# Patient Record
Sex: FEMALE | Race: BLACK OR AFRICAN AMERICAN | Employment: OTHER | URBAN - METROPOLITAN AREA
[De-identification: names, ages, dates, MRNs, and addresses within clinical notes are randomized per-mention and may not be internally consistent; named-entity substitution may affect disease eponyms.]

---

## 2018-02-19 ENCOUNTER — HOSPITAL ENCOUNTER (OUTPATIENT)
Dept: LAB | Age: 73
Discharge: HOME | End: 2018-02-19
Payer: MEDICARE

## 2018-02-19 DIAGNOSIS — I50.22 CHRONIC SYSTOLIC HF (HEART FAILURE) (HCC): Chronic | ICD-10-CM

## 2018-02-19 LAB
ALBUMIN SERPL-MCNC: 3.7 G/DL (ref 3.2–4.6)
ALBUMIN/GLOB SERPL: 0.8 {RATIO}
ALP SERPL-CCNC: 66 U/L (ref 50–136)
ALT SERPL-CCNC: 15 U/L (ref 12–65)
ANION GAP SERPL CALC-SCNC: 5 MMOL/L
AST SERPL-CCNC: 22 U/L (ref 15–37)
BASOPHILS # BLD: 0 K/UL (ref 0–0.2)
BASOPHILS NFR BLD: 0 % (ref 0–2)
BILIRUB SERPL-MCNC: 0.6 MG/DL (ref 0.2–1.1)
BUN SERPL-MCNC: 22 MG/DL (ref 8–23)
CALCIUM SERPL-MCNC: 9 MG/DL (ref 8.3–10.4)
CHLORIDE SERPL-SCNC: 107 MMOL/L (ref 98–107)
CO2 SERPL-SCNC: 29 MMOL/L (ref 21–32)
CREAT SERPL-MCNC: 1.1 MG/DL (ref 0.6–1)
DIFFERENTIAL METHOD BLD: ABNORMAL
EOSINOPHIL # BLD: 0.1 K/UL (ref 0–0.8)
EOSINOPHIL NFR BLD: 2 % (ref 0.5–7.8)
ERYTHROCYTE [DISTWIDTH] IN BLOOD BY AUTOMATED COUNT: 14.3 % (ref 11.9–14.6)
GLOBULIN SER CALC-MCNC: 4.7 G/DL
GLUCOSE SERPL-MCNC: 106 MG/DL (ref 65–100)
HCT VFR BLD AUTO: 36 % (ref 35.8–46.3)
HGB BLD-MCNC: 11.4 G/DL (ref 11.7–15.4)
LYMPHOCYTES # BLD: 1.7 K/UL (ref 0.5–4.6)
LYMPHOCYTES NFR BLD: 35 % (ref 13–44)
MAGNESIUM SERPL-MCNC: 2.6 MG/DL (ref 1.8–2.4)
MCH RBC QN AUTO: 26.9 PG (ref 26.1–32.9)
MCHC RBC AUTO-ENTMCNC: 31.7 G/DL (ref 31.4–35)
MCV RBC AUTO: 84.9 FL (ref 79.6–97.8)
MONOCYTES # BLD: 0.5 K/UL (ref 0.1–1.3)
MONOCYTES NFR BLD: 11 % (ref 4–12)
NEUTS SEG # BLD: 2.5 K/UL (ref 1.7–8.2)
NEUTS SEG NFR BLD: 52 % (ref 43–78)
PLATELET # BLD AUTO: 209 K/UL (ref 150–450)
PMV BLD AUTO: 9.7 FL (ref 10.8–14.1)
POTASSIUM SERPL-SCNC: 3.7 MMOL/L (ref 3.5–5.1)
PROT SERPL-MCNC: 8.4 G/DL (ref 6.3–8.2)
RBC # BLD AUTO: 4.24 M/UL (ref 4.05–5.25)
SODIUM SERPL-SCNC: 141 MMOL/L (ref 136–145)
WBC # BLD AUTO: 4.9 K/UL (ref 4.3–11.1)

## 2018-02-19 PROCEDURE — 36415 COLL VENOUS BLD VENIPUNCTURE: CPT | Performed by: INTERNAL MEDICINE

## 2018-02-19 PROCEDURE — 80053 COMPREHEN METABOLIC PANEL: CPT | Performed by: INTERNAL MEDICINE

## 2018-02-19 PROCEDURE — 83735 ASSAY OF MAGNESIUM: CPT | Performed by: INTERNAL MEDICINE

## 2018-02-19 PROCEDURE — 85025 COMPLETE CBC W/AUTO DIFF WBC: CPT | Performed by: INTERNAL MEDICINE

## 2018-02-25 ENCOUNTER — ANESTHESIA EVENT (OUTPATIENT)
Dept: SURGERY | Age: 73
End: 2018-02-25
Payer: MEDICARE

## 2018-02-25 RX ORDER — HYDROMORPHONE HYDROCHLORIDE 2 MG/ML
0.5 INJECTION, SOLUTION INTRAMUSCULAR; INTRAVENOUS; SUBCUTANEOUS
Status: CANCELLED | OUTPATIENT
Start: 2018-02-25

## 2018-02-25 RX ORDER — OXYCODONE HYDROCHLORIDE 5 MG/1
5 TABLET ORAL
Status: CANCELLED | OUTPATIENT
Start: 2018-02-25

## 2018-02-25 RX ORDER — SODIUM CHLORIDE, SODIUM LACTATE, POTASSIUM CHLORIDE, CALCIUM CHLORIDE 600; 310; 30; 20 MG/100ML; MG/100ML; MG/100ML; MG/100ML
100 INJECTION, SOLUTION INTRAVENOUS CONTINUOUS
Status: CANCELLED | OUTPATIENT
Start: 2018-02-25

## 2018-02-26 ENCOUNTER — ANESTHESIA (OUTPATIENT)
Dept: SURGERY | Age: 73
End: 2018-02-26
Payer: MEDICARE

## 2018-02-26 ENCOUNTER — HOSPITAL ENCOUNTER (OUTPATIENT)
Age: 73
Setting detail: OUTPATIENT SURGERY
Discharge: HOME | End: 2018-02-26
Attending: INTERNAL MEDICINE | Admitting: INTERNAL MEDICINE
Payer: MEDICARE

## 2018-02-26 ENCOUNTER — APPOINTMENT (OUTPATIENT)
Dept: CARDIAC CATH/INVASIVE PROCEDURES | Age: 73
End: 2018-02-26
Payer: MEDICARE

## 2018-02-26 VITALS
RESPIRATION RATE: 19 BRPM | OXYGEN SATURATION: 95 % | BODY MASS INDEX: 23.82 KG/M2 | TEMPERATURE: 97 F | HEART RATE: 77 BPM | SYSTOLIC BLOOD PRESSURE: 123 MMHG | HEIGHT: 65 IN | WEIGHT: 143 LBS | DIASTOLIC BLOOD PRESSURE: 56 MMHG

## 2018-02-26 LAB
ALBUMIN SERPL-MCNC: 3.4 G/DL (ref 3.2–4.6)
ALBUMIN/GLOB SERPL: 0.8 {RATIO} (ref 1.2–3.5)
ALP SERPL-CCNC: 56 U/L (ref 50–136)
ALT SERPL-CCNC: 10 U/L (ref 12–65)
ANION GAP SERPL CALC-SCNC: 8 MMOL/L (ref 7–16)
AST SERPL-CCNC: 17 U/L (ref 15–37)
ATRIAL RATE: 62 BPM
BILIRUB SERPL-MCNC: 0.4 MG/DL (ref 0.2–1.1)
BUN SERPL-MCNC: 10 MG/DL (ref 8–23)
CALCIUM SERPL-MCNC: 8.8 MG/DL (ref 8.3–10.4)
CALCULATED R AXIS, ECG10: 58 DEGREES
CALCULATED T AXIS, ECG11: 13 DEGREES
CHLORIDE SERPL-SCNC: 107 MMOL/L (ref 98–107)
CO2 SERPL-SCNC: 29 MMOL/L (ref 21–32)
CREAT SERPL-MCNC: 0.85 MG/DL (ref 0.6–1)
DIAGNOSIS, 93000: NORMAL
ERYTHROCYTE [DISTWIDTH] IN BLOOD BY AUTOMATED COUNT: 14.7 % (ref 11.9–14.6)
GLOBULIN SER CALC-MCNC: 4.1 G/DL (ref 2.3–3.5)
GLUCOSE SERPL-MCNC: 88 MG/DL (ref 65–100)
HCT VFR BLD AUTO: 34.4 % (ref 35.8–46.3)
HGB BLD-MCNC: 10.6 G/DL (ref 11.7–15.4)
INR PPP: 1.1
MAGNESIUM SERPL-MCNC: 1.9 MG/DL (ref 1.8–2.4)
MCH RBC QN AUTO: 26 PG (ref 26.1–32.9)
MCHC RBC AUTO-ENTMCNC: 30.8 G/DL (ref 31.4–35)
MCV RBC AUTO: 84.5 FL (ref 79.6–97.8)
PLATELET # BLD AUTO: 215 K/UL (ref 150–450)
PMV BLD AUTO: 9.7 FL (ref 10.8–14.1)
POTASSIUM SERPL-SCNC: 3.3 MMOL/L (ref 3.5–5.1)
PROT SERPL-MCNC: 7.5 G/DL (ref 6.3–8.2)
PROTHROMBIN TIME: 14.1 SEC (ref 11.5–14.5)
Q-T INTERVAL, ECG07: 422 MS
QRS DURATION, ECG06: 142 MS
QTC CALCULATION (BEZET), ECG08: 442 MS
RBC # BLD AUTO: 4.07 M/UL (ref 4.05–5.25)
SODIUM SERPL-SCNC: 144 MMOL/L (ref 136–145)
VENTRICULAR RATE, ECG03: 66 BPM
WBC # BLD AUTO: 4.7 K/UL (ref 4.3–11.1)

## 2018-02-26 PROCEDURE — 74011250636 HC RX REV CODE- 250/636

## 2018-02-26 PROCEDURE — 74011000250 HC RX REV CODE- 250

## 2018-02-26 PROCEDURE — 77030028698 HC BLD TISS PLSM MEDT -D

## 2018-02-26 PROCEDURE — 80053 COMPREHEN METABOLIC PANEL: CPT | Performed by: INTERNAL MEDICINE

## 2018-02-26 PROCEDURE — C1722 AICD, SINGLE CHAMBER: HCPCS

## 2018-02-26 PROCEDURE — 77030012935 HC DRSG AQUACEL BMS -B

## 2018-02-26 PROCEDURE — 74011000250 HC RX REV CODE- 250: Performed by: INTERNAL MEDICINE

## 2018-02-26 PROCEDURE — 93641 EP EVL 1/2CHMB PAC CVDFB TST: CPT

## 2018-02-26 PROCEDURE — 74011250636 HC RX REV CODE- 250/636: Performed by: INTERNAL MEDICINE

## 2018-02-26 PROCEDURE — 93005 ELECTROCARDIOGRAM TRACING: CPT | Performed by: INTERNAL MEDICINE

## 2018-02-26 PROCEDURE — 33262 RMVL& REPLC PULSE GEN 1 LEAD: CPT

## 2018-02-26 PROCEDURE — 74011000272 HC RX REV CODE- 272: Performed by: INTERNAL MEDICINE

## 2018-02-26 PROCEDURE — 83735 ASSAY OF MAGNESIUM: CPT | Performed by: INTERNAL MEDICINE

## 2018-02-26 PROCEDURE — 77030008467 HC STPLR SKN COVD -B

## 2018-02-26 PROCEDURE — 85027 COMPLETE CBC AUTOMATED: CPT | Performed by: INTERNAL MEDICINE

## 2018-02-26 PROCEDURE — 74011250636 HC RX REV CODE- 250/636: Performed by: ANESTHESIOLOGY

## 2018-02-26 PROCEDURE — 85610 PROTHROMBIN TIME: CPT | Performed by: INTERNAL MEDICINE

## 2018-02-26 PROCEDURE — 76060000033 HC ANESTHESIA 1 TO 1.5 HR: Performed by: INTERNAL MEDICINE

## 2018-02-26 RX ORDER — FENTANYL CITRATE 50 UG/ML
INJECTION, SOLUTION INTRAMUSCULAR; INTRAVENOUS AS NEEDED
Status: DISCONTINUED | OUTPATIENT
Start: 2018-02-26 | End: 2018-02-26 | Stop reason: HOSPADM

## 2018-02-26 RX ORDER — FENTANYL CITRATE 50 UG/ML
100 INJECTION, SOLUTION INTRAMUSCULAR; INTRAVENOUS ONCE
Status: DISCONTINUED | OUTPATIENT
Start: 2018-02-26 | End: 2018-02-26 | Stop reason: HOSPADM

## 2018-02-26 RX ORDER — LIDOCAINE HYDROCHLORIDE 10 MG/ML
0.1 INJECTION INFILTRATION; PERINEURAL AS NEEDED
Status: DISCONTINUED | OUTPATIENT
Start: 2018-02-26 | End: 2018-02-26 | Stop reason: HOSPADM

## 2018-02-26 RX ORDER — MIDAZOLAM HYDROCHLORIDE 1 MG/ML
2 INJECTION, SOLUTION INTRAMUSCULAR; INTRAVENOUS
Status: DISCONTINUED | OUTPATIENT
Start: 2018-02-26 | End: 2018-02-26 | Stop reason: HOSPADM

## 2018-02-26 RX ORDER — BUPIVACAINE HYDROCHLORIDE AND EPINEPHRINE 5; 5 MG/ML; UG/ML
60 INJECTION, SOLUTION EPIDURAL; INTRACAUDAL; PERINEURAL ONCE
Status: COMPLETED | OUTPATIENT
Start: 2018-02-26 | End: 2018-02-26

## 2018-02-26 RX ORDER — MIDAZOLAM HYDROCHLORIDE 1 MG/ML
2 INJECTION, SOLUTION INTRAMUSCULAR; INTRAVENOUS ONCE
Status: DISCONTINUED | OUTPATIENT
Start: 2018-02-26 | End: 2018-02-26 | Stop reason: HOSPADM

## 2018-02-26 RX ORDER — CEFAZOLIN SODIUM/WATER 2 G/20 ML
2 SYRINGE (ML) INTRAVENOUS
Status: COMPLETED | OUTPATIENT
Start: 2018-02-26 | End: 2018-02-26

## 2018-02-26 RX ORDER — SODIUM CHLORIDE, SODIUM LACTATE, POTASSIUM CHLORIDE, CALCIUM CHLORIDE 600; 310; 30; 20 MG/100ML; MG/100ML; MG/100ML; MG/100ML
100 INJECTION, SOLUTION INTRAVENOUS CONTINUOUS
Status: DISCONTINUED | OUTPATIENT
Start: 2018-02-26 | End: 2018-02-26 | Stop reason: HOSPADM

## 2018-02-26 RX ORDER — LIDOCAINE HYDROCHLORIDE 20 MG/ML
INJECTION, SOLUTION EPIDURAL; INFILTRATION; INTRACAUDAL; PERINEURAL AS NEEDED
Status: DISCONTINUED | OUTPATIENT
Start: 2018-02-26 | End: 2018-02-26 | Stop reason: HOSPADM

## 2018-02-26 RX ORDER — PROPOFOL 10 MG/ML
INJECTION, EMULSION INTRAVENOUS AS NEEDED
Status: DISCONTINUED | OUTPATIENT
Start: 2018-02-26 | End: 2018-02-26 | Stop reason: HOSPADM

## 2018-02-26 RX ORDER — PROPOFOL 10 MG/ML
INJECTION, EMULSION INTRAVENOUS
Status: DISCONTINUED | OUTPATIENT
Start: 2018-02-26 | End: 2018-02-26 | Stop reason: HOSPADM

## 2018-02-26 RX ORDER — EPHEDRINE SULFATE 50 MG/ML
INJECTION, SOLUTION INTRAVENOUS AS NEEDED
Status: DISCONTINUED | OUTPATIENT
Start: 2018-02-26 | End: 2018-02-26 | Stop reason: HOSPADM

## 2018-02-26 RX ADMIN — PROPOFOL 140 MCG/KG/MIN: 10 INJECTION, EMULSION INTRAVENOUS at 11:49

## 2018-02-26 RX ADMIN — EPHEDRINE SULFATE 5 MG: 50 INJECTION, SOLUTION INTRAVENOUS at 12:27

## 2018-02-26 RX ADMIN — LIDOCAINE HYDROCHLORIDE 20 MG: 20 INJECTION, SOLUTION EPIDURAL; INFILTRATION; INTRACAUDAL; PERINEURAL at 11:49

## 2018-02-26 RX ADMIN — EPHEDRINE SULFATE 5 MG: 50 INJECTION, SOLUTION INTRAVENOUS at 12:18

## 2018-02-26 RX ADMIN — Medication 2 G: at 11:53

## 2018-02-26 RX ADMIN — NEOMYCIN AND POLYMYXIN B SULFATES: 40; 200000 IRRIGANT IRRIGATION at 12:30

## 2018-02-26 RX ADMIN — PROPOFOL 20 MG: 10 INJECTION, EMULSION INTRAVENOUS at 12:08

## 2018-02-26 RX ADMIN — SODIUM CHLORIDE, SODIUM LACTATE, POTASSIUM CHLORIDE, AND CALCIUM CHLORIDE: 600; 310; 30; 20 INJECTION, SOLUTION INTRAVENOUS at 11:44

## 2018-02-26 RX ADMIN — FENTANYL CITRATE 25 MCG: 50 INJECTION, SOLUTION INTRAMUSCULAR; INTRAVENOUS at 12:08

## 2018-02-26 RX ADMIN — FENTANYL CITRATE 25 MCG: 50 INJECTION, SOLUTION INTRAMUSCULAR; INTRAVENOUS at 11:58

## 2018-02-26 RX ADMIN — PROPOFOL 40 MG: 10 INJECTION, EMULSION INTRAVENOUS at 11:49

## 2018-02-26 RX ADMIN — BUPIVACAINE HYDROCHLORIDE AND EPINEPHRINE BITARTRATE 300 MG: 5; .005 INJECTION, SOLUTION EPIDURAL; INTRACAUDAL; PERINEURAL at 12:00

## 2018-02-26 NOTE — PROGRESS NOTES
Report received from [address] [place] [address]. Procedural findings communicated. Intra procedural  medication administration reviewed. Progression of care discussed. Patient received into [address] [facility] 5 post pacemaker battery change.      Incision site without bleeding or swelling     Dressing dry and intact     Patient instructed to limit movement to right upper extremity    Routine post procedural vital signs and site assessment initiated

## 2018-02-26 NOTE — PROGRESS NOTES
Discharge instructions given per orders, voiced good understanding of post pacemaker battery change care, medications & follow up care. Denies any questions.  Discharged to home with family

## 2018-02-26 NOTE — PROCEDURES
PRE-ELECTROPHYSIOLOGY STUDY DIAGNOSES  1. Implantable cardioverter-defibrillator at elective replacement interval.  2. Ischemic dilated cardiomyopathy. 3. Ejection fraction 20-25% at the time of implant  4. Class II heart failure symptoms. 5. Initial implantable cardioverter-defibrillator was implanted for primary prevention. 6. On Guideline directed medical therapy maximum dose for 3 months prior to implant. 7. Life expectancy greater than 1 year. Rut Mustard PROCEDURES PERFORMED  1. Removal and replacement of a Single/Dual implantable cardioverter-defibrillator. 2. Testing of implantable cardioverter-defibrillator. Anesthesia: MAC     Estimated Blood Loss: Less than 10 mL     Specimens: * No specimens in log *     PROCEDURE IN DETAIL: The patient was brought into the EP lab in a fasting  state. The left shoulder was prepped and draped in the usual sterile  fashion, skin anesthetized with lidocaine with epinephrine. Incision was  made over the previous ICD. Previous  ICD was  removed through blunt dissection techniques. Pocket was irrigated with  triple-antibiotic flush. The leads were connected to a new Biotronik   ICD, model Itrevia 7 VR-T, serial number [id number]. The  leads and ICD were then placed in the pocket area. Defibrillator threshold testing was performed. The patient was placed  into ventricular fibrillation and was converted out with 25 joules'  energy. Pocket was then closed with 2-0 Vicryl, followed by a next layer of 3-0  Vicryl, followed by a superficial layer of staples. The wound was  dressed. The patient was recovered from their sedation, transferred from  the lab in a stable condition. IMPRESSION: Successful generator change of a Biotronik   implantable cardioverter-defibrillator with successful defibrillator  threshold testing.

## 2018-02-26 NOTE — PROGRESS NOTES
Assisted OOB & ambulated to BR & on unit. Tolerated activity without difficulty.  Left chest site without bleeding or hematoma

## 2018-02-26 NOTE — PROGRESS NOTES
Pt arrived, ambulated to room with no visible problems, planned Gen Change for Dr Ame Ivey. Consent signed, Procedure discussed with pt all questions answered voiced understanding. Medications and history discussed with pt.     Pt prepped per orders

## 2018-02-26 NOTE — IP AVS SNAPSHOT
[address] 
[phone] Patient: Babs Yen MRN: [id number] Discharge Summary 2/26/2018 Babs Yen MRN[de-[id number]]  [id number] Admission Information Provider Pager Service Admission Date Expected D/C Date Syd Mcgregor MD  CARDIAC CATH LAB 2/26/2018 2/26/2018 Actual LOS Patient Class 0 days OUTPATIENT SURGERY Follow-up Information Follow up With Details Comments Contact Info Antonio Wiggins MD   [address] 
[address] [facility] [address] 
[phone] Syd Mcgregor MD On 3/9/2018 at 9:00am in the [place] office [address]  [address] [facility] [address] 
[phone] My Medications TAKE these medications as instructed Instructions Each Dose to Equal  
 Morning Noon Evening Bedtime  
 amLODIPine 5 mg tablet Commonly known as:  [place] Your last dose was: Your next dose is: Take 1 Tab by mouth daily. 5 mg  
    
   
   
   
  
 aspirin delayed-release 81 mg tablet Your last dose was: Your next dose is:    
   
   
 take 1 Tab by mouth daily. 81 mg  
    
   
   
   
  
 carvedilol 25 mg tablet Commonly known as:  Mason Ogden Your last dose was: Your next dose is: Take 1 Tab by mouth two (2) times daily (with meals). 25 mg  
    
   
   
   
  
 cefUROXime 500 mg tablet Commonly known as:  CEFTIN Your last dose was: Your next dose is: Take 1 Tab by mouth two (2) times a day for 7 days. 500 mg  
    
   
   
   
  
 cholecalciferol (VITAMIN D3) 5,000 unit Tab tablet Commonly known as:  VITAMIN D3 Your last dose was: Your next dose is: Take 5,000 Units by mouth daily. 5000 Units * lisinopril 40 mg tablet Commonly known as:  [place]  
   
 Your last dose was: Your next dose is: Take 1 Tab by mouth daily for 90 days. 40 mg  
    
   
   
   
  
 * lisinopril 40 mg tablet Commonly known as:  Carletta Cockayne Your last dose was: Your next dose is: Take 1 Tab by mouth daily. 40 mg  
    
   
   
   
  
 rosuvastatin 20 mg tablet Commonly known as:  CRESTOR Your last dose was: Your next dose is: Take 1 Tab by mouth daily. 20 mg  
    
   
   
   
  
 * Notice: This list has 2 medication(s) that are the same as other medications prescribed for you. Read the directions carefully, and ask your doctor or other care provider to review them with you. General Information Please provide this summary of care documentation to your next provider. Allergies Unspecified:  Other Medication Current Immunizations  Never Reviewed Name Date Influenza Vaccine 2/22/2017, 11/12/2015 Pneumococcal Polysaccharide (PPSV-23) 11/4/2013 Discharge Instructions Discharge Instructions Pacemaker Battery Change Out You may shower in 24 hours with the water to your back DO NOT put any lotions, creams, powders, ointments over your incision for 2 weeks. DO NOT remove your dressing. They will remove it at your appointment. If you have staples or stitches these will be removed at your follow-up appointment. If your incision becomes very red, swollen, there is drainage coming out of the incision, tender, or you have a fever greater than 101 please contact your doctor immediately. You may use your pacemaker arm but DO NOT raise the arm higher than your shoulder for the first 2 weeks so that your incision can heal. 
 
DO NOT lift more than 5-10 pounds for 2 weeks and 20 pounds for one month. DO NOT push or pull with the pacemaker arm for 2 weeks. Microwaves will not harm your pacemaker. Remember to keep your pacemaker card with you at all times. Within 6 weeks you should receive your permanent card. Keep your temporary card as a spare. If you do not receive your permanent card or lose your card please contact your doctor. At airports, always show your pacemaker card. You may walk through the metal detectors, but DO NOT allow the hand held wand near your pacemaker. Be sure to talk with your doctor before having an MRI. If you need to change the follow up appointment that has been made for you please contact your doctor's office. Discharge Orders None  
  
` Patient Signature:  ____________________________________________________________ Date:  ____________________________________________________________  
  
 [facility] Provider Signature:  ____________________________________________________________ Date:  ____________________________________________________________

## 2018-02-26 NOTE — DISCHARGE INSTRUCTIONS
Pacemaker Battery Change Out    You may shower in 24 hours with the water to your back DO NOT put any lotions, creams, powders, ointments over your incision for 2 weeks. DO NOT remove your dressing. They will remove it at your appointment. If you have staples or stitches these will be removed at your follow-up appointment. If your incision becomes very red, swollen, there is drainage coming out of the incision, tender, or you have a fever greater than 101 please contact your doctor immediately. You may use your pacemaker arm but DO NOT raise the arm higher than your shoulder for the first 2 weeks so that your incision can heal.    DO NOT lift more than 5-10 pounds for 2 weeks and 20 pounds for one month. DO NOT push or pull with the pacemaker arm for 2 weeks. Microwaves will not harm your pacemaker. Remember to keep your pacemaker card with you at all times. Within 6 weeks you should receive your permanent card. Keep your temporary card as a spare. If you do not receive your permanent card or lose your card please contact your doctor. At airports, always show your pacemaker card. You may walk through the metal detectors, but DO NOT allow the hand held wand near your pacemaker. Be sure to talk with your doctor before having an MRI. If you need to change the follow up appointment that has been made for you please contact your doctor's office.

## 2018-02-26 NOTE — ANESTHESIA POSTPROCEDURE EVALUATION
Post-Anesthesia Evaluation and Assessment    Patient: Adebayo Rodriguez MRN: [id number]  SSN: [id number]    YOB: 1945  Age: 81 y.o. Sex: female       Cardiovascular Function/Vital Signs  Visit Vitals    /57    Pulse 71    Temp 36.1 °C (97 °F)    Resp 14    Ht 5' 5\" (1.651 m)    Wt 64.9 kg (143 lb)    SpO2 99%    Breastfeeding No    BMI 23.8 kg/m2       Patient is status post total IV anesthesia anesthesia for Procedure(s):  ICD GEN CHANGE. Nausea/Vomiting: None    Postoperative hydration reviewed and adequate. Pain:  Pain Scale 1: Numeric (0 - 10) (02/26/18 1031)  Pain Intensity 1: 0 (02/26/18 1031)   Managed    Neurological Status: At baseline    Mental Status and Level of Consciousness: Awake. Pulmonary Status:   O2 Device: Nasal cannula (02/26/18 1248)   Adequate oxygenation and airway patent    Complications related to anesthesia: None    Post-anesthesia assessment completed.  No concerns    Signed By: Afsaneh Escudero MD     February 26, 2018

## 2018-02-26 NOTE — ANESTHESIA PREPROCEDURE EVALUATION
Anesthetic History   No history of anesthetic complications            Review of Systems / Medical History  Pertinent labs reviewed    Pulmonary  Within defined limits                 Neuro/Psych   Within defined limits           Cardiovascular    Hypertension  Valvular problems/murmurs (MVR 2009): mitral insufficiency    CHF (EF 45-50%)    Pacemaker (ICD), past MI (1999), CAD, PAD, cardiac stents (1999) and CABG (2009)    Exercise tolerance: <4 METS     GI/Hepatic/Renal     GERD           Endo/Other        Arthritis     Other Findings            Physical Exam    Airway  Mallampati: II  TM Distance: 4 - 6 cm  Neck ROM: normal range of motion   Mouth opening: Normal     Cardiovascular          Murmur: Grade 2, Mitral area     Dental    Dentition: Edentulous     Pulmonary  Breath sounds clear to auscultation               Abdominal  GI exam deferred       Other Findings            Anesthetic Plan    ASA: 3  Anesthesia type: total IV anesthesia          Induction: Intravenous  Anesthetic plan and risks discussed with: Patient

## 2018-06-21 ENCOUNTER — PATIENT OUTREACH (OUTPATIENT)
Dept: CASE MANAGEMENT | Age: 73
End: 2018-06-21

## 2018-06-21 NOTE — PROGRESS NOTES
Medication Adherence Outreach    Date/Time of Call:  6/21/2018  9:55 am    Name of medication and dosage:   * Lisinopril 40 mg 1 every day     *Rosuvastatin 20 mg 1 every day    Does the patient know the purpose and dosage of medication? * Yes    Are you getting a #30 day or #90 day supply of your medication? * 90 day supply    Are you still taking this medication? If not, why? * Yes    Transportation issues or any problems paying for the medication or other reason? * Transportation provided by family members. What pharmacy are you using to fill your medication and do you know the last time that you got your medication filled? * [facility]    * Last refill 5/11/2018      Are you having any side effects from taking your medication? * No          Any other questions or concerns expressed by the patient. * No    Comments:  * Discussed medication adherence with Mrs. Sangita Negron and she states she has no current barriers to prevent her from obtaining or taking her medication.           Call Completed By:  Nita Ge LPN, [facility]

## 2018-11-29 ENCOUNTER — HOSPITAL ENCOUNTER (OUTPATIENT)
Dept: MAMMOGRAPHY | Age: 73
Discharge: HOME | End: 2018-11-29
Attending: FAMILY MEDICINE
Payer: MEDICARE

## 2018-11-29 DIAGNOSIS — Z12.39 SCREENING FOR MALIGNANT NEOPLASM OF BREAST: ICD-10-CM

## 2018-11-29 PROCEDURE — 77067 SCR MAMMO BI INCL CAD: CPT

## 2019-02-01 ENCOUNTER — APPOINTMENT (OUTPATIENT)
Dept: CT IMAGING | Age: 74
End: 2019-02-01
Attending: EMERGENCY MEDICINE
Payer: MEDICARE

## 2019-02-01 ENCOUNTER — APPOINTMENT (OUTPATIENT)
Dept: GENERAL RADIOLOGY | Age: 74
End: 2019-02-01
Attending: EMERGENCY MEDICINE
Payer: MEDICARE

## 2019-02-01 ENCOUNTER — HOSPITAL ENCOUNTER (OUTPATIENT)
Age: 74
Setting detail: OBSERVATION
Discharge: HOME | End: 2019-02-04
Attending: EMERGENCY MEDICINE | Admitting: INTERNAL MEDICINE
Payer: MEDICARE

## 2019-02-01 DIAGNOSIS — J10.1 INFLUENZA A: Primary | ICD-10-CM

## 2019-02-01 PROBLEM — R09.02 HYPOXIA: Status: ACTIVE | Noted: 2019-02-01

## 2019-02-01 PROBLEM — J09.X2 INFLUENZA A (H5N1): Status: ACTIVE | Noted: 2019-02-01

## 2019-02-01 LAB
ALBUMIN SERPL-MCNC: 3.7 G/DL (ref 3.2–4.6)
ALBUMIN/GLOB SERPL: 0.8 {RATIO} (ref 1.2–3.5)
ALP SERPL-CCNC: 55 U/L (ref 50–136)
ALT SERPL-CCNC: 20 U/L (ref 12–65)
ANION GAP SERPL CALC-SCNC: 10 MMOL/L (ref 7–16)
AST SERPL-CCNC: 40 U/L (ref 15–37)
BASOPHILS # BLD: 0 K/UL (ref 0–0.2)
BASOPHILS NFR BLD: 0 % (ref 0–2)
BILIRUB SERPL-MCNC: 0.6 MG/DL (ref 0.2–1.1)
BUN SERPL-MCNC: 15 MG/DL (ref 8–23)
CALCIUM SERPL-MCNC: 9.1 MG/DL (ref 8.3–10.4)
CHLORIDE SERPL-SCNC: 108 MMOL/L (ref 98–107)
CO2 SERPL-SCNC: 22 MMOL/L (ref 21–32)
CREAT SERPL-MCNC: 1 MG/DL (ref 0.6–1)
DIFFERENTIAL METHOD BLD: ABNORMAL
EOSINOPHIL # BLD: 0 K/UL (ref 0–0.8)
EOSINOPHIL NFR BLD: 0 % (ref 0.5–7.8)
ERYTHROCYTE [DISTWIDTH] IN BLOOD BY AUTOMATED COUNT: 16.1 % (ref 11.9–14.6)
FLUAV AG NPH QL IA: POSITIVE
FLUBV AG NPH QL IA: NEGATIVE
GLOBULIN SER CALC-MCNC: 4.6 G/DL (ref 2.3–3.5)
GLUCOSE SERPL-MCNC: 113 MG/DL (ref 65–100)
HCT VFR BLD AUTO: 32.6 % (ref 35.8–46.3)
HGB BLD-MCNC: 9.7 G/DL (ref 11.7–15.4)
IMM GRANULOCYTES # BLD AUTO: 0.1 K/UL (ref 0–0.5)
IMM GRANULOCYTES NFR BLD AUTO: 1 % (ref 0–5)
LACTATE BLD-SCNC: 2.22 MMOL/L (ref 0.5–1.9)
LYMPHOCYTES # BLD: 0.9 K/UL (ref 0.5–4.6)
LYMPHOCYTES NFR BLD: 7 % (ref 13–44)
MCH RBC QN AUTO: 23.6 PG (ref 26.1–32.9)
MCHC RBC AUTO-ENTMCNC: 29.8 G/DL (ref 31.4–35)
MCV RBC AUTO: 79.3 FL (ref 79.6–97.8)
MONOCYTES # BLD: 0.7 K/UL (ref 0.1–1.3)
MONOCYTES NFR BLD: 5 % (ref 4–12)
NEUTS SEG # BLD: 11.8 K/UL (ref 1.7–8.2)
NEUTS SEG NFR BLD: 87 % (ref 43–78)
NRBC # BLD: 0 K/UL (ref 0–0.2)
PLATELET # BLD AUTO: 180 K/UL (ref 150–450)
PMV BLD AUTO: 10.3 FL (ref 9.4–12.3)
POTASSIUM SERPL-SCNC: 3.5 MMOL/L (ref 3.5–5.1)
PROCALCITONIN SERPL-MCNC: 0.1 NG/ML
PROT SERPL-MCNC: 8.3 G/DL (ref 6.3–8.2)
RBC # BLD AUTO: 4.11 M/UL (ref 4.05–5.2)
SODIUM SERPL-SCNC: 140 MMOL/L (ref 136–145)
SPECIMEN SOURCE: ABNORMAL
TROPONIN I BLD-MCNC: 0 NG/ML (ref 0.02–0.05)
WBC # BLD AUTO: 13.5 K/UL (ref 4.3–11.1)

## 2019-02-01 PROCEDURE — 99218 HC RM OBSERVATION: CPT

## 2019-02-01 PROCEDURE — 87804 INFLUENZA ASSAY W/OPTIC: CPT

## 2019-02-01 PROCEDURE — 74011250637 HC RX REV CODE- 250/637: Performed by: EMERGENCY MEDICINE

## 2019-02-01 PROCEDURE — 84484 ASSAY OF TROPONIN QUANT: CPT

## 2019-02-01 PROCEDURE — 93005 ELECTROCARDIOGRAM TRACING: CPT | Performed by: EMERGENCY MEDICINE

## 2019-02-01 PROCEDURE — 80053 COMPREHEN METABOLIC PANEL: CPT

## 2019-02-01 PROCEDURE — 99285 EMERGENCY DEPT VISIT HI MDM: CPT | Performed by: EMERGENCY MEDICINE

## 2019-02-01 PROCEDURE — 85025 COMPLETE CBC W/AUTO DIFF WBC: CPT

## 2019-02-01 PROCEDURE — 36415 COLL VENOUS BLD VENIPUNCTURE: CPT

## 2019-02-01 PROCEDURE — 74011636320 HC RX REV CODE- 636/320: Performed by: EMERGENCY MEDICINE

## 2019-02-01 PROCEDURE — 96375 TX/PRO/DX INJ NEW DRUG ADDON: CPT | Performed by: EMERGENCY MEDICINE

## 2019-02-01 PROCEDURE — 87040 BLOOD CULTURE FOR BACTERIA: CPT

## 2019-02-01 PROCEDURE — 74011250636 HC RX REV CODE- 250/636: Performed by: EMERGENCY MEDICINE

## 2019-02-01 PROCEDURE — 84145 PROCALCITONIN (PCT): CPT

## 2019-02-01 PROCEDURE — 74011000258 HC RX REV CODE- 258: Performed by: EMERGENCY MEDICINE

## 2019-02-01 PROCEDURE — 71046 X-RAY EXAM CHEST 2 VIEWS: CPT

## 2019-02-01 PROCEDURE — 71260 CT THORAX DX C+: CPT

## 2019-02-01 PROCEDURE — 83605 ASSAY OF LACTIC ACID: CPT

## 2019-02-01 PROCEDURE — 96365 THER/PROPH/DIAG IV INF INIT: CPT | Performed by: EMERGENCY MEDICINE

## 2019-02-01 RX ORDER — OSELTAMIVIR PHOSPHATE 75 MG/1
75 CAPSULE ORAL 2 TIMES DAILY
Status: DISCONTINUED | OUTPATIENT
Start: 2019-02-01 | End: 2019-02-01 | Stop reason: DRUGHIGH

## 2019-02-01 RX ORDER — ROSUVASTATIN CALCIUM 20 MG/1
20 TABLET, COATED ORAL DAILY
Status: DISCONTINUED | OUTPATIENT
Start: 2019-02-02 | End: 2019-02-04 | Stop reason: HOSPADM

## 2019-02-01 RX ORDER — LISINOPRIL 20 MG/1
40 TABLET ORAL DAILY
Status: DISCONTINUED | OUTPATIENT
Start: 2019-02-02 | End: 2019-02-04 | Stop reason: HOSPADM

## 2019-02-01 RX ORDER — OSELTAMIVIR PHOSPHATE 30 MG/1
30 CAPSULE ORAL 2 TIMES DAILY
Status: DISCONTINUED | OUTPATIENT
Start: 2019-02-01 | End: 2019-02-04 | Stop reason: HOSPADM

## 2019-02-01 RX ORDER — ONDANSETRON 2 MG/ML
4 INJECTION INTRAMUSCULAR; INTRAVENOUS
Status: DISCONTINUED | OUTPATIENT
Start: 2019-02-01 | End: 2019-02-04 | Stop reason: HOSPADM

## 2019-02-01 RX ORDER — ASPIRIN 81 MG/1
81 TABLET ORAL DAILY
Status: DISCONTINUED | OUTPATIENT
Start: 2019-02-02 | End: 2019-02-04 | Stop reason: HOSPADM

## 2019-02-01 RX ORDER — SODIUM CHLORIDE 0.9 % (FLUSH) 0.9 %
10 SYRINGE (ML) INJECTION
Status: COMPLETED | OUTPATIENT
Start: 2019-02-01 | End: 2019-02-01

## 2019-02-01 RX ORDER — AMLODIPINE BESYLATE 5 MG/1
5 TABLET ORAL DAILY
Status: DISCONTINUED | OUTPATIENT
Start: 2019-02-02 | End: 2019-02-04 | Stop reason: HOSPADM

## 2019-02-01 RX ORDER — MELATONIN
5000 DAILY
Status: DISCONTINUED | OUTPATIENT
Start: 2019-02-02 | End: 2019-02-04 | Stop reason: HOSPADM

## 2019-02-01 RX ORDER — ONDANSETRON 2 MG/ML
4 INJECTION INTRAMUSCULAR; INTRAVENOUS
Status: COMPLETED | OUTPATIENT
Start: 2019-02-01 | End: 2019-02-01

## 2019-02-01 RX ORDER — ACETAMINOPHEN 325 MG/1
650 TABLET ORAL
Status: DISCONTINUED | OUTPATIENT
Start: 2019-02-01 | End: 2019-02-04 | Stop reason: HOSPADM

## 2019-02-01 RX ORDER — SODIUM CHLORIDE 0.9 % (FLUSH) 0.9 %
5-40 SYRINGE (ML) INJECTION AS NEEDED
Status: DISCONTINUED | OUTPATIENT
Start: 2019-02-01 | End: 2019-02-04 | Stop reason: HOSPADM

## 2019-02-01 RX ORDER — CARVEDILOL 25 MG/1
25 TABLET ORAL 2 TIMES DAILY WITH MEALS
Status: DISCONTINUED | OUTPATIENT
Start: 2019-02-02 | End: 2019-02-04 | Stop reason: HOSPADM

## 2019-02-01 RX ORDER — SODIUM CHLORIDE 0.9 % (FLUSH) 0.9 %
5-40 SYRINGE (ML) INJECTION EVERY 8 HOURS
Status: DISCONTINUED | OUTPATIENT
Start: 2019-02-01 | End: 2019-02-04 | Stop reason: HOSPADM

## 2019-02-01 RX ADMIN — SODIUM CHLORIDE 1000 ML: 900 INJECTION, SOLUTION INTRAVENOUS at 15:34

## 2019-02-01 RX ADMIN — SODIUM CHLORIDE 100 ML: 9 INJECTION, SOLUTION INTRAVENOUS at 15:59

## 2019-02-01 RX ADMIN — OSELTAMIVIR PHOSPHATE 30 MG: 30 CAPSULE ORAL at 17:57

## 2019-02-01 RX ADMIN — Medication 10 ML: at 15:59

## 2019-02-01 RX ADMIN — Medication 5 ML: at 21:51

## 2019-02-01 RX ADMIN — ONDANSETRON 4 MG: 2 INJECTION INTRAMUSCULAR; INTRAVENOUS at 15:34

## 2019-02-01 RX ADMIN — CEFTRIAXONE SODIUM 1 G: 1 INJECTION, POWDER, FOR SOLUTION INTRAMUSCULAR; INTRAVENOUS at 15:34

## 2019-02-01 RX ADMIN — IOPAMIDOL 100 ML: 755 INJECTION, SOLUTION INTRAVENOUS at 15:59

## 2019-02-01 NOTE — ED PROVIDER NOTES
61-year-old lady presents with concerns about shortness of breath and fatigue. She initially went to an urgent care and as she was going and she nearly passed out. She did not fall or hit her head and staff there were able to help her sit down. Patient says that she has had a cough and some shortness of breath for the past two days. Those were the symptoms or causing her to present to her doctor. She denies any specific fevers or chills. Elements of this note were created using speech recognition software. As such, errors of speech recognition may be present. Past Medical History:  
Diagnosis Date  Acute mitral regurgitation  AICD (automatic cardioverter/defibrillator) present 2015  Anterior myocardial infarction ([facility]  Arrhythmia  Benign hypertensive heart disease with CHF (congestive heart failure) ([facility] 2015  CAD (coronary artery disease) [id number]; PC ; Ischemic CM EF 25%  Carotid artery stenosis without cerebral infarction 2015  Chronic ischemic heart disease 2015  Chronic systolic HF (heart failure) ([facility] 3/17/2016  
  - EF 30-35%. 2010 - Single leadICD - [employer]ronik 2012:  EF 45-50% 2013:  EF 40-45% 2013:  EF 45% 2014:  EF 45% 2015:  EF 40-45%  Coronary atherosclerosis of native coronary artery  Dyspnea on exertion  GERD (gastroesophageal reflux disease)  GI bleed 2009  Hyperlipidemia  Hypertension  OA (osteoarthritis)  PUD (peptic ulcer disease)   
 hx ulcer  dx 2009  S/P mitral valve repair 3/17/2016  Tobacco use disorder Past Surgical History:  
Procedure Laterality Date  HX  SECTION    
 x2  
 HX CORONARY ARTERY BYPASS GRAFT    
 HX HEART CATHETERIZATION   PCI  x 1 - pt does not have stent card  HX TUBAL LIGATION    
 MITRALPLASTY W CP BYPASS    
 with repair device Family History:  
Problem Relation Age of Onset  Heart Attack Father  Heart Disease Father  Heart Disease Brother  Heart Surgery Brother  Heart Disease Sister  No Known Problems Mother  Coronary Artery Disease Other Social History Socioeconomic History  Marital status: SINGLE Spouse name: Not on file  Number of children: Not on file  Years of education: Not on file  Highest education level: Not on file Social Needs  Financial resource strain: Not on file  Food insecurity - worry: Not on file  Food insecurity - inability: Not on file  Transportation needs - medical: Not on file  Transportation needs - non-medical: Not on file Occupational History  Not on file Tobacco Use  Smoking status: Former Smoker Packs/day: 0.50 Years: 45.00 Pack years: 22.50 Last attempt to quit: 2009 Years since quittin.6  Smokeless tobacco: Never Used  Tobacco comment: 09 Substance and Sexual Activity  Alcohol use: No  
 Drug use: Yes Types: Prescription  Sexual activity: Not on file Other Topics Concern  Not on file Social History Narrative She lives in [place] ALLERGIES: Other medication Review of Systems Constitutional: Positive for activity change and fatigue. Negative for chills, diaphoresis and fever. HENT: Negative for congestion, rhinorrhea and sore throat. Eyes: Negative for redness and visual disturbance. Respiratory: Positive for cough and shortness of breath. Negative for chest tightness and wheezing. Cardiovascular: Negative for chest pain and palpitations. Gastrointestinal: Negative for abdominal pain, blood in stool, diarrhea, nausea and vomiting. Endocrine: Negative for polydipsia and polyuria. Genitourinary: Negative for dysuria and hematuria. Musculoskeletal: Negative for arthralgias, myalgias and neck stiffness. Skin: Negative for rash. Allergic/Immunologic: Negative for environmental allergies and food allergies. Neurological: Negative for dizziness, weakness and headaches. Near syncope Hematological: Negative for adenopathy. Does not bruise/bleed easily. Psychiatric/Behavioral: Negative for confusion and sleep disturbance. The patient is not nervous/anxious. Vitals:  
 02/01/19 1635 02/01/19 1636 02/01/19 1637 02/01/19 1645 BP:      
Pulse:      
Resp:      
Temp:      
SpO2: (!) 88% (!) 86% (!) 87% 94% Weight:      
Height:      
      
 
Physical Exam  
Constitutional: She is oriented to person, place, and time. She appears well-developed and well-nourished. HENT:  
Head: Normocephalic and atraumatic. Mouth/Throat: Oropharynx is clear and moist.  
Eyes: Conjunctivae are normal. Pupils are equal, round, and reactive to light. Right eye exhibits no discharge. Left eye exhibits no discharge. Neck: No thyromegaly present. Cardiovascular: Normal rate, regular rhythm and normal heart sounds. No murmur heard. Pulmonary/Chest: Effort normal and breath sounds normal.  
Abdominal: Soft. Bowel sounds are normal. There is no tenderness. There is no rebound and no guarding. Musculoskeletal: Normal range of motion. She exhibits no edema. Neurological: She is alert and oriented to person, place, and time. She exhibits normal muscle tone. Coordination normal.  
Skin: Skin is warm and dry. Psychiatric: She has a normal mood and affect. Her behavior is normal.  
  
 
MDM Number of Diagnoses or Management Options Diagnosis management comments: Patient's lactic acid and white count are mildly elevated, therefore I will empirically treat with some Rocephin. 4:50 PM 
CTA is negative. She does not have an infiltrate and no evidence of a pulmonary embolism. Her flu test was positive so I do not think the antibiotics need to be continued. I will give her a dose of Tamiflu. Since her oxygen saturation drops rapidly into the 80s off of oxygen I will discuss the case with the hospitalist. 
 
I spoke with the hospitalist who kindly agreed to see the patient. Procedures

## 2019-02-01 NOTE — ED NOTES
TRANSFER - OUT REPORT: 
 
Verbal report given to April RN(name) on Belkys Quevedo  being transferred to  808(unit) for routine progression of care Report consisted of patients Situation, Background, Assessment and  
Recommendations(SBAR). Information from the following report(s) ED Summary was reviewed with the receiving nurse. Lines:  
Peripheral IV 02/01/19 Left Forearm (Active) Site Assessment Clean, dry, & intact 2/1/2019  2:53 PM  
Phlebitis Assessment 0 2/1/2019  2:53 PM  
Infiltration Assessment 0 2/1/2019  2:53 PM  
Dressing Status Clean, dry, & intact 2/1/2019  2:53 PM  
   
Peripheral IV 02/01/19 Right Antecubital (Active) Site Assessment Clean, dry, & intact 2/1/2019  3:08 PM  
Phlebitis Assessment 0 2/1/2019  3:08 PM  
Infiltration Assessment 0 2/1/2019  3:08 PM  
Dressing Status Clean, dry, & intact 2/1/2019  3:08 PM  
Dressing Type Transparent 2/1/2019  3:08 PM  
Hub Color/Line Status Pink;Capped;Flushed;Patent 2/1/2019  3:08 PM  
  
 
Opportunity for questions and clarification was provided.    
 
Patient transported with:

## 2019-02-01 NOTE — ED TRIAGE NOTES
Pt arrives ems from [facility] urgent care, c/o cough since last night and has felt SOB x2 hours ago. Near syncope or possible syncope at urgent care, staff unsure which per ems. No complaints of pain at this time. Oxygen saturation low 90s initially. Highest with oxygen 3L NC 95%. Low grade fever according to urgent care. /58. Right bundle branch block on ekg but otherwise unremarkable.

## 2019-02-01 NOTE — PROGRESS NOTES
Pt admitted from ER she is alert and oriented. rr are even and unlabored. End shift SBAR given to on coming nurse.

## 2019-02-01 NOTE — H&P
Hospitalist H&P Note Admit Date:  2019  2:36 PM  
Name:  Víctor King Age:  72 y.o. 
:  1945 MRN:  [id number] PCP:  Cezar Bonilla MD 
Treatment Team: Attending Provider: Anitha Sidhu MD; Primary Nurse: Roly Gonsalez 
 
HPI:  
 
Pt is a 73 yo female with pmh CAD sp CABG, ICD, CHF who presented to ER after an episode of near syncope at urgent care this afternoon. Pt reports feeling weak, tired, poor appetite, productive cough x 3 days. ER work up reveals influenaa A pos. She denies dizziness with her near syncope and states she is \"just so weak\". Noted to be hypoxic in ER requiring 2 L NC. Hospitalist will admit under obs. Discussed with pt and pt's son for likely d/c home in 1-2 days. 10 systems reviewed and negative except as noted in HPI. Past Medical History:  
Diagnosis Date  Acute mitral regurgitation  AICD (automatic cardioverter/defibrillator) present 2015  Anterior myocardial infarction ([address]  Arrhythmia  Benign hypertensive heart disease with CHF (congestive heart failure) ([address] 2015  CAD (coronary artery disease) [id number]; PC ; Ischemic CM EF 25%  Carotid artery stenosis without cerebral infarction 2015  Chronic ischemic heart disease 2015  Chronic systolic HF (heart failure) ([address] 3/17/2016  
  - EF 30-35%. 2010 - Single leadICD - [employer] 2012:  EF 45-50% 2013:  EF 40-45% 2013:  EF 45% 2014:  EF 45% 2015:  EF 40-45%  Coronary atherosclerosis of native coronary artery  Dyspnea on exertion  GERD (gastroesophageal reflux disease)  GI bleed 2009  Hyperlipidemia  Hypertension  OA (osteoarthritis)  PUD (peptic ulcer disease)   
 hx ulcer  dx 2009  S/P mitral valve repair 3/17/2016  Tobacco use disorder Past Surgical History:  
Procedure Laterality Date  HX  SECTION    
 x2  
 HX CORONARY ARTERY BYPASS GRAFT [address] PCI  x 1 - pt does not have stent card  HX TUBAL LIGATION    
 MITRALPLASTY W CP BYPASS    
 with repair device Allergies Allergen Reactions  Other Medication Rash Glove powder causes rash; can use powder-free gloves ok. Social History Tobacco Use  Smoking status: Former Smoker Packs/day: 0.50 Years: 45.00 Pack years: 22.50 Last attempt to quit: 2009 Years since quittin.6  Smokeless tobacco: Never Used  Tobacco comment: 09 Substance Use Topics  Alcohol use: No  
  
Family History Problem Relation Age of Onset  Heart Attack Father  Heart Disease Father  Heart Disease Brother  Heart Surgery Brother  Heart Disease Sister  No Known Problems Mother  Coronary Artery Disease Other Immunization History Administered Date(s) Administered  Influenza Vaccine 2015, 2017  Pneumococcal Conjugate (PCV-13) 2018  Pneumococcal Polysaccharide (PPSV-23) 2013 PTA Medications: 
Prior to Admission Medications Prescriptions Last Dose Informant Patient Reported? Taking? amLODIPine (NORVASC) 5 mg tablet   No No  
Sig: Take 1 Tab by mouth daily. aspirin delayed-release 81 mg tablet   No No  
Sig: take 1 Tab by mouth daily. carvedilol (COREG) 25 mg tablet   No No  
Sig: Take 1 Tab by mouth two (2) times daily (with meals). cholecalciferol, VITAMIN D3, (VITAMIN D3) 5,000 unit tab tablet   Yes No  
Sig: Take 5,000 Units by mouth daily. lisinopril (PRINIVIL, ZESTRIL) 40 mg tablet   No No  
Sig: Take 1 Tab by mouth daily. rosuvastatin (CRESTOR) 20 mg tablet   No No  
Sig: Take 1 Tab by mouth daily. Facility-Administered Medications: None Objective:  
 
Patient Vitals for the past 24 hrs: 
 Temp Pulse Resp BP SpO2  
19 1645     94 % 19 1637     (!) 87 % 19 1636     (!) 86 % 19 1635     (!) 88 % 02/01/19 1634     92 % 02/01/19 1544    104/57 95 % 02/01/19 1513    111/57 95 % 02/01/19 1444    107/55 96 % 02/01/19 1316 98.3 °F (36.8 °C) 97 18 102/63 93 % Oxygen Therapy O2 Sat (%): 94 % (02/01/19 1645) Pulse via Oximetry: 127 beats per minute (02/01/19 1645) O2 Device: Room air (02/01/19 1316) No intake or output data in the 24 hours ending 02/01/19 1743 Physical Exam: 
General:    Well nourished. Alert. Eyes:   Normal sclera. Extraocular movements intact. ENT:  Normocephalic, atraumatic. Moist mucous membranes CV:   RRR. No m/r/g. Peripheral pulses 2+. Capillary refill <2s. Lungs:  CTAB. No wheezing, rhonchi, or rales. Abdomen: Soft, nontender, nondistended. Extremities: Warm and dry. No cyanosis or edema. Neurologic: CN II-XII grossly intact. Sensation intact. Skin:     No rashes or jaundice. Normal coloration Psych:  Normal mood and affect. I reviewed the labs, imaging, EKGs, telemetry, and other studies done this admission. Data Review:  
Recent Results (from the past 24 hour(s)) CBC WITH AUTOMATED DIFF Collection Time: 02/01/19  1:23 PM  
Result Value Ref Range WBC 13.5 (H) 4.3 - 11.1 K/uL  
 RBC 4.11 4.05 - 5.2 M/uL HGB 9.7 (L) 11.7 - 15.4 g/dL HCT 32.6 (L) 35.8 - 46.3 % MCV 79.3 (L) 79.6 - 97.8 FL  
 MCH 23.6 (L) 26.1 - 32.9 PG  
 MCHC 29.8 (L) 31.4 - 35.0 g/dL  
 RDW 16.1 (H) 11.9 - 14.6 % PLATELET 001 202 - 118 K/uL MPV 10.3 9.4 - 12.3 FL ABSOLUTE NRBC 0.00 0.0 - 0.2 K/uL  
 DF AUTOMATED NEUTROPHILS 87 (H) 43 - 78 % LYMPHOCYTES 7 (L) 13 - 44 % MONOCYTES 5 4.0 - 12.0 % EOSINOPHILS 0 (L) 0.5 - 7.8 % BASOPHILS 0 0.0 - 2.0 % IMMATURE GRANULOCYTES 1 0.0 - 5.0 %  
 ABS. NEUTROPHILS 11.8 (H) 1.7 - 8.2 K/UL  
 ABS. LYMPHOCYTES 0.9 0.5 - 4.6 K/UL  
 ABS. MONOCYTES 0.7 0.1 - 1.3 K/UL  
 ABS. EOSINOPHILS 0.0 0.0 - 0.8 K/UL  
 ABS. BASOPHILS 0.0 0.0 - 0.2 K/UL  
 ABS. IMM. GRANS. 0.1 0.0 - 0.5 K/UL METABOLIC PANEL, COMPREHENSIVE Collection Time: 02/01/19  1:23 PM  
Result Value Ref Range Sodium 140 136 - 145 mmol/L Potassium 3.5 3.5 - 5.1 mmol/L Chloride 108 (H) 98 - 107 mmol/L  
 CO2 22 21 - 32 mmol/L Anion gap 10 7 - 16 mmol/L Glucose 113 (H) 65 - 100 mg/dL BUN 15 8 - 23 MG/DL Creatinine 1.00 0.6 - 1.0 MG/DL  
 GFR est AA >60 >60 ml/min/1.73m2 GFR est non-AA 58 (L) >60 ml/min/1.73m2 Calcium 9.1 8.3 - 10.4 MG/DL Bilirubin, total 0.6 0.2 - 1.1 MG/DL  
 ALT (SGPT) 20 12 - 65 U/L  
 AST (SGOT) 40 (H) 15 - 37 U/L Alk. phosphatase 55 50 - 136 U/L Protein, total 8.3 (H) 6.3 - 8.2 g/dL Albumin 3.7 3.2 - 4.6 g/dL Globulin 4.6 (H) 2.3 - 3.5 g/dL A-G Ratio 0.8 (L) 1.2 - 3.5 PROCALCITONIN Collection Time: 02/01/19  1:23 PM  
Result Value Ref Range Procalcitonin 0.1 ng/mL POC TROPONIN-I Collection Time: 02/01/19  1:26 PM  
Result Value Ref Range Troponin-I (POC) 0 (L) 0.02 - 0.05 ng/ml EKG, 12 LEAD, INITIAL Collection Time: 02/01/19  1:27 PM  
Result Value Ref Range Ventricular Rate 94 BPM  
 Atrial Rate 94 BPM  
 P-R Interval 176 ms QRS Duration 136 ms  
 Q-T Interval 386 ms QTC Calculation (Bezet) 482 ms Calculated R Axis 147 degrees Calculated T Axis 35 degrees Diagnosis Normal sinus rhythm Right bundle branch block Anteroseptal infarct (cited on or before 09-SEP-2009) Abnormal ECG When compared with ECG of 26-FEB-2018 10:35, 
Previous ECG has undetermined rhythm, needs review Questionable change in initial forces of Septal leads POC LACTIC ACID Collection Time: 02/01/19  1:28 PM  
Result Value Ref Range Lactic Acid (POC) 2.22 (H) 0.5 - 1.9 mmol/L INFLUENZA A & B AG (RAPID TEST) Collection Time: 02/01/19  3:59 PM  
Result Value Ref Range Influenza A Ag POSITIVE (A) NEG Influenza B Ag NEGATIVE  NEG Source NASOPHARYNGEAL All Micro Results Procedure Component Value Units Date/Time INFLUENZA A & B AG (RAPID TEST) [[id number]]  (Abnormal) Collected:  02/01/19 1559 Order Status:  Completed Specimen:  Nasopharyngeal from Nasal washing Updated:  02/01/19 1619 Influenza A Ag POSITIVE Comment: A POSITIVE RESULT MAY OCCUR IN THE ABSENCE OF VIABLE VIRUS Influenza B Ag NEGATIVE Comment: NEGATIVE FOR THE PRESENCE OF INFLUENZA B ANTIGEN 
INFECTION DUE TO INFLUENZA B CANNOT BE RULED OUT. BECAUSE THE ANTIGEN PRESENT IN THE SAMPLE MAY BE BELOW 
THE DETECTION LIMIT OF THE TEST. A NEGATIVE TEST IS PRESUMPTIVE AND IT IS RECOMMENDED THAT THESE RESULTS BE CONFIRMED BY VIRAL CULTURE OR AN FDA-CLEARED INFLUENZA A AND B MOLECULAR ASSAY. Source NASOPHARYNGEAL     
 CULTURE, BLOOD [[id number]] Order Status:  Sent Specimen:  Whole Blood CULTURE, BLOOD [[id number]] Order Status:  Sent Specimen:  Whole Blood Current Facility-Administered Medications Medication Dose Route Frequency  sodium chloride 0.9 % bolus infusion 1,000 mL  1,000 mL IntraVENous ONCE  
 oseltamivir (TAMIFLU) capsule 30 mg  30 mg Oral BID Current Outpatient Medications Medication Sig  
 rosuvastatin (CRESTOR) 20 mg tablet Take 1 Tab by mouth daily.  lisinopril (PRINIVIL, ZESTRIL) 40 mg tablet Take 1 Tab by mouth daily.  carvedilol (COREG) 25 mg tablet Take 1 Tab by mouth two (2) times daily (with meals).  amLODIPine (NORVASC) 5 mg tablet Take 1 Tab by mouth daily.  cholecalciferol, VITAMIN D3, (VITAMIN D3) 5,000 unit tab tablet Take 5,000 Units by mouth daily.  aspirin delayed-release 81 mg tablet take 1 Tab by mouth daily. Other Studies: Xr Chest Pa Lat Result Date: 2/1/2019 Chest, 2 views, 2/1/2019 Indication:Shortness of breath and weakness Comparison:3/9/2010 Post median sternotomy changes. There is a mitral valve replacement. Left subclavian defibrillating device noted. Heart enlarged. No overt failure or infiltrate.  Bony thorax grossly intact with only minimal degenerative change. Impression:Stable chest. Cardiomegaly without overt failure or focal infiltrate post median sternotomy with valve replacement. Ct Chest W Cont Result Date: 2/1/2019 Chest CT, with intravenous contrast 2/1/2019. Indication:  Shortness of breath and weakness. Comparison: Chest CT 8/31/2009. Chest x-ray today. . Technique:  Spiral images through the chest with 100 cc Isovue 370. IV contrast was used to evaluate the pulmonary arterial tree. Coronal reconstructed images also reviewed. Radiation dose reduction techniques were used for this study. Our CT scanners use one or all of the following: Automated exposure control, adjustment of the mA and/or kV according to patient size, iterative reconstruction. The pulmonary arterial tree is adequately opacified. There are no central or convincing distal emboli. No evidence for aortic dissection. Post median sternotomy changes. Pacing device. Mitral valve replacement. Native coronary artery calcification. No mediastinal hilar or axillary lymphadenopathy. 6 mm left lower lobe pulmonary nodule unchanged from prior therefore considered benign. There is no alveolar consolidation. Lobular emphysematous changes. IMPRESSION: 1. No central or convincing distal emboli. 2. No acute finding in the chest. 3. Stable left lung nodule is therefore considered benign. Assessment and Plan:  
 
Hospital Problems as of 2/1/2019 Date Reviewed: 2/1/2019 Codes Class Noted - Resolved POA Hypoxia ICD-10-CM: R09.02 
ICD-9-CM: 799.02  2/1/2019 - Present Unknown Influenza A with respiratory manifestations ICD-10-CM: J10.1 ICD-9-CM: 487.1  2/1/2019 - Present Unknown Benign essential hypertension ICD-10-CM: I10 
ICD-9-CM: 401.1  7/12/2016 - Present Yes Pure hypercholesterolemia ICD-10-CM: E78.00 ICD-9-CM: 272.0  7/12/2016 - Present Yes  Chronic systolic HF (heart failure) (HCC) (Chronic) ICD-10-CM: B10.09 
 ICD-9-CM: 428.22  3/17/2016 - Present Yes Overview Addendum 1/25/2018  3:21 PM by Kody Sidhu MD  
  0/2009 - EF 30-35%. 02/2010 - Single leadICD - [employer] 02/2012:  EF 45-50% 03/2013:  EF 40-45% 09/2013:  EF 45% 09/2014:  EF 45% 09/2015:  EF 40-45% 09/2016:  EF 50-55% 01/2018:  EF 50% S/P CABG (coronary artery bypass graft) ICD-10-CM: Z95.1 ICD-9-CM: V45.81  9/10/2009 - Present Yes Overview Signed 3/17/2016  7:33 AM by Kody Sidhu MD  
  GRIJALVA-LAD, SVG-RI, SVG-PDA Plan: 
 
Influenza A Start Tamiflu BID x 5 days (eot 02/06), renally dose Supportive therapy- 02 for sats > 90 Near syncope Without dizziness, likely r/t acute infection Remote tele CHF Cont home meds Norvasc, Coreg, Lisinopril, ASA 
 
DC planning Likely home in 1-2 days DVT ppx: SCDs Code status:  Full Estimated LOS:  Greater than 2 midnights Risk:  high Signed: 
Arnold Sosa NP

## 2019-02-02 LAB
ANION GAP SERPL CALC-SCNC: 7 MMOL/L (ref 7–16)
ATRIAL RATE: 94 BPM
BASOPHILS # BLD: 0 K/UL (ref 0–0.2)
BASOPHILS NFR BLD: 0 % (ref 0–2)
BUN SERPL-MCNC: 15 MG/DL (ref 8–23)
CALCIUM SERPL-MCNC: 8 MG/DL (ref 8.3–10.4)
CALCULATED R AXIS, ECG10: 147 DEGREES
CALCULATED T AXIS, ECG11: 35 DEGREES
CHLORIDE SERPL-SCNC: 108 MMOL/L (ref 98–107)
CO2 SERPL-SCNC: 25 MMOL/L (ref 21–32)
CREAT SERPL-MCNC: 0.93 MG/DL (ref 0.6–1)
DIAGNOSIS, 93000: NORMAL
DIFFERENTIAL METHOD BLD: ABNORMAL
EOSINOPHIL # BLD: 0 K/UL (ref 0–0.8)
EOSINOPHIL NFR BLD: 0 % (ref 0.5–7.8)
ERYTHROCYTE [DISTWIDTH] IN BLOOD BY AUTOMATED COUNT: 16.2 % (ref 11.9–14.6)
GLUCOSE SERPL-MCNC: 98 MG/DL (ref 65–100)
HCT VFR BLD AUTO: 29.6 % (ref 35.8–46.3)
HGB BLD-MCNC: 8.6 G/DL (ref 11.7–15.4)
IMM GRANULOCYTES # BLD AUTO: 0.1 K/UL (ref 0–0.5)
IMM GRANULOCYTES NFR BLD AUTO: 1 % (ref 0–5)
LYMPHOCYTES # BLD: 1.2 K/UL (ref 0.5–4.6)
LYMPHOCYTES NFR BLD: 12 % (ref 13–44)
MCH RBC QN AUTO: 23.5 PG (ref 26.1–32.9)
MCHC RBC AUTO-ENTMCNC: 29.1 G/DL (ref 31.4–35)
MCV RBC AUTO: 80.9 FL (ref 79.6–97.8)
MONOCYTES # BLD: 0.9 K/UL (ref 0.1–1.3)
MONOCYTES NFR BLD: 9 % (ref 4–12)
NEUTS SEG # BLD: 7.9 K/UL (ref 1.7–8.2)
NEUTS SEG NFR BLD: 78 % (ref 43–78)
NRBC # BLD: 0 K/UL (ref 0–0.2)
P-R INTERVAL, ECG05: 176 MS
PLATELET # BLD AUTO: 158 K/UL (ref 150–450)
PMV BLD AUTO: 10.5 FL (ref 9.4–12.3)
POTASSIUM SERPL-SCNC: 3.2 MMOL/L (ref 3.5–5.1)
Q-T INTERVAL, ECG07: 386 MS
QRS DURATION, ECG06: 136 MS
QTC CALCULATION (BEZET), ECG08: 482 MS
RBC # BLD AUTO: 3.66 M/UL (ref 4.05–5.2)
SODIUM SERPL-SCNC: 140 MMOL/L (ref 136–145)
VENTRICULAR RATE, ECG03: 94 BPM
WBC # BLD AUTO: 10.1 K/UL (ref 4.3–11.1)

## 2019-02-02 PROCEDURE — 96372 THER/PROPH/DIAG INJ SC/IM: CPT

## 2019-02-02 PROCEDURE — 85025 COMPLETE CBC W/AUTO DIFF WBC: CPT

## 2019-02-02 PROCEDURE — 99218 HC RM OBSERVATION: CPT

## 2019-02-02 PROCEDURE — 74011250637 HC RX REV CODE- 250/637: Performed by: INTERNAL MEDICINE

## 2019-02-02 PROCEDURE — 94640 AIRWAY INHALATION TREATMENT: CPT

## 2019-02-02 PROCEDURE — 36415 COLL VENOUS BLD VENIPUNCTURE: CPT

## 2019-02-02 PROCEDURE — 74011250637 HC RX REV CODE- 250/637: Performed by: EMERGENCY MEDICINE

## 2019-02-02 PROCEDURE — 74011000250 HC RX REV CODE- 250: Performed by: FAMILY MEDICINE

## 2019-02-02 PROCEDURE — 94760 N-INVAS EAR/PLS OXIMETRY 1: CPT

## 2019-02-02 PROCEDURE — 77010033678 HC OXYGEN DAILY

## 2019-02-02 PROCEDURE — 80048 BASIC METABOLIC PNL TOTAL CA: CPT

## 2019-02-02 PROCEDURE — 74011250636 HC RX REV CODE- 250/636: Performed by: FAMILY MEDICINE

## 2019-02-02 RX ORDER — HEPARIN SODIUM 5000 [USP'U]/ML
5000 INJECTION, SOLUTION INTRAVENOUS; SUBCUTANEOUS EVERY 12 HOURS
Status: DISCONTINUED | OUTPATIENT
Start: 2019-02-02 | End: 2019-02-04 | Stop reason: HOSPADM

## 2019-02-02 RX ORDER — ALBUTEROL SULFATE 0.83 MG/ML
2.5 SOLUTION RESPIRATORY (INHALATION)
Status: DISCONTINUED | OUTPATIENT
Start: 2019-02-02 | End: 2019-02-03

## 2019-02-02 RX ADMIN — OSELTAMIVIR PHOSPHATE 30 MG: 30 CAPSULE ORAL at 09:00

## 2019-02-02 RX ADMIN — Medication 5 ML: at 06:39

## 2019-02-02 RX ADMIN — Medication 10 ML: at 17:16

## 2019-02-02 RX ADMIN — Medication 5 ML: at 22:15

## 2019-02-02 RX ADMIN — ROSUVASTATIN CALCIUM 20 MG: 20 TABLET, COATED ORAL at 08:54

## 2019-02-02 RX ADMIN — ASPIRIN 81 MG: 81 TABLET, COATED ORAL at 08:54

## 2019-02-02 RX ADMIN — HEPARIN SODIUM 5000 UNITS: 5000 INJECTION INTRAVENOUS; SUBCUTANEOUS at 12:16

## 2019-02-02 RX ADMIN — ALBUTEROL SULFATE 2.5 MG: 2.5 SOLUTION RESPIRATORY (INHALATION) at 16:04

## 2019-02-02 RX ADMIN — OSELTAMIVIR PHOSPHATE 30 MG: 30 CAPSULE ORAL at 18:00

## 2019-02-02 RX ADMIN — VITAMIN D, TAB 1000IU (100/BT) 5000 UNITS: 25 TAB at 08:55

## 2019-02-02 NOTE — PROGRESS NOTES
Received bedside shift report from Samia Ibarra RN. Pt lying in bed. No apparent distress. Respirations even and unlabored. Instructed to call for assistance with needs, as they arise. Pt voiced understanding.

## 2019-02-02 NOTE — PROGRESS NOTES
Pt reports being SOB with activity and does not want to ambulate. Is quietly resting in bed, denies pain.

## 2019-02-02 NOTE — PROGRESS NOTES
Pt. Is stable, alert and oriented x4. Is currently resting in bed. Denies pain at this time. Pt said after lunch she would be able to walk around with respiratory therapy. Pt condition has not changed since first assessment, will continue to monitor.

## 2019-02-02 NOTE — PROGRESS NOTES
Progress Note Patient: Ekta Duncan MRN: [id number]  SSN: [id number] YOB: 1945  Age: 72 y.o. Sex: female Admit Date: 2/1/2019 LOS: 0 days Subjective: F/U weakness and influenza A Admitted yesterday. Was feeling better and then became more SOB throughout the day. No chest pain. Current Facility-Administered Medications Medication Dose Route Frequency  oseltamivir (TAMIFLU) capsule 30 mg  30 mg Oral BID  amLODIPine (NORVASC) tablet 5 mg  5 mg Oral DAILY  aspirin delayed-release tablet 81 mg  81 mg Oral DAILY  carvedilol (COREG) tablet 25 mg  25 mg Oral BID WITH MEALS  lisinopril (PRINIVIL, ZESTRIL) tablet 40 mg  40 mg Oral DAILY  rosuvastatin (CRESTOR) tablet 20 mg  20 mg Oral DAILY  cholecalciferol (VITAMIN D3) tablet 5,000 Units  5,000 Units Oral DAILY  sodium chloride (NS) flush 5-40 mL  5-40 mL IntraVENous Q8H  
 sodium chloride (NS) flush 5-40 mL  5-40 mL IntraVENous PRN  
 acetaminophen (TYLENOL) tablet 650 mg  650 mg Oral Q4H PRN  
 ondansetron (ZOFRAN) injection 4 mg  4 mg IntraVENous Q4H PRN Objective:  
 
Vitals:  
 02/01/19 2325 02/02/19 4453 02/02/19 7541 02/02/19 6870 BP: 107/51 115/58  91/54 Pulse: (!) 103 97  90 Resp: 18 18  18 Temp: 98.8 °F (37.1 °C) 98.9 °F (37.2 °C)  98.2 °F (36.8 °C) SpO2: 93% 95%  94% Weight:   64.5 kg (142 lb 4.8 oz) Height:      
  
  
Intake and Output: 
Current Shift: No intake/output data recorded. Last three shifts: 01/31 1901 - 02/02 0700 In: 720 [P.O.:720] Out: - Physical Exam:  
General:  Alert, cooperative, no distress, appears stated age. Eyes:  Conjunctivae/corneas clear. Ears:  Normal TMs and external ear canals both ears. Nose: Nares normal. Septum midline. Mouth/Throat: Lips, mucosa, and tongue normal. Teeth and gums normal.  
Neck:  no JVD. Back:   Symmetric, no curvature. ROM normal. No CVA tenderness. Lungs:   Clear to auscultation bilaterally. Heart:  Regular rate and rhythm, S1, S2 normal, no murmur, click, rub or gallop. Abdomen:   Soft, non-tender. Bowel sounds normal. No masses,  No organomegaly. Extremities: Extremities normal, atraumatic, no cyanosis or edema. Pulses: 2+ and symmetric all extremities. Skin: Skin color, texture, turgor normal. No rashes or lesions Lymph nodes: Cervical, supraclavicular, and axillary nodes normal.  
Neurologic: CNII-XII intact. Lab/Data Review: 
 
Recent Results (from the past 24 hour(s)) CBC WITH AUTOMATED DIFF Collection Time: 02/01/19  1:23 PM  
Result Value Ref Range WBC 13.5 (H) 4.3 - 11.1 K/uL  
 RBC 4.11 4.05 - 5.2 M/uL HGB 9.7 (L) 11.7 - 15.4 g/dL HCT 32.6 (L) 35.8 - 46.3 % MCV 79.3 (L) 79.6 - 97.8 FL  
 MCH 23.6 (L) 26.1 - 32.9 PG  
 MCHC 29.8 (L) 31.4 - 35.0 g/dL  
 RDW 16.1 (H) 11.9 - 14.6 % PLATELET 501 387 - 424 K/uL MPV 10.3 9.4 - 12.3 FL ABSOLUTE NRBC 0.00 0.0 - 0.2 K/uL  
 DF AUTOMATED NEUTROPHILS 87 (H) 43 - 78 % LYMPHOCYTES 7 (L) 13 - 44 % MONOCYTES 5 4.0 - 12.0 % EOSINOPHILS 0 (L) 0.5 - 7.8 % BASOPHILS 0 0.0 - 2.0 % IMMATURE GRANULOCYTES 1 0.0 - 5.0 %  
 ABS. NEUTROPHILS 11.8 (H) 1.7 - 8.2 K/UL  
 ABS. LYMPHOCYTES 0.9 0.5 - 4.6 K/UL  
 ABS. MONOCYTES 0.7 0.1 - 1.3 K/UL  
 ABS. EOSINOPHILS 0.0 0.0 - 0.8 K/UL  
 ABS. BASOPHILS 0.0 0.0 - 0.2 K/UL  
 ABS. IMM. GRANS. 0.1 0.0 - 0.5 K/UL METABOLIC PANEL, COMPREHENSIVE Collection Time: 02/01/19  1:23 PM  
Result Value Ref Range Sodium 140 136 - 145 mmol/L Potassium 3.5 3.5 - 5.1 mmol/L Chloride 108 (H) 98 - 107 mmol/L  
 CO2 22 21 - 32 mmol/L Anion gap 10 7 - 16 mmol/L Glucose 113 (H) 65 - 100 mg/dL BUN 15 8 - 23 MG/DL Creatinine 1.00 0.6 - 1.0 MG/DL  
 GFR est AA >60 >60 ml/min/1.73m2 GFR est non-AA 58 (L) >60 ml/min/1.73m2 Calcium 9.1 8.3 - 10.4 MG/DL  Bilirubin, total 0.6 0.2 - 1.1 MG/DL  
 ALT (SGPT) 20 12 - 65 U/L  
 AST (SGOT) 40 (H) 15 - 37 U/L  
 Alk. phosphatase 55 50 - 136 U/L Protein, total 8.3 (H) 6.3 - 8.2 g/dL Albumin 3.7 3.2 - 4.6 g/dL Globulin 4.6 (H) 2.3 - 3.5 g/dL A-G Ratio 0.8 (L) 1.2 - 3.5 PROCALCITONIN Collection Time: 02/01/19  1:23 PM  
Result Value Ref Range Procalcitonin 0.1 ng/mL POC TROPONIN-I Collection Time: 02/01/19  1:26 PM  
Result Value Ref Range Troponin-I (POC) 0 (L) 0.02 - 0.05 ng/ml EKG, 12 LEAD, INITIAL Collection Time: 02/01/19  1:27 PM  
Result Value Ref Range Ventricular Rate 94 BPM  
 Atrial Rate 94 BPM  
 P-R Interval 176 ms QRS Duration 136 ms  
 Q-T Interval 386 ms QTC Calculation (Bezet) 482 ms Calculated R Axis 147 degrees Calculated T Axis 35 degrees Diagnosis Normal sinus rhythm Right bundle branch block Abnormal ECG When compared with ECG of 26-FEB-2018 10:35, 
Previous ECG has undetermined rhythm, needs review Questionable change in initial forces of Septal leads Confirmed by Pooja Helton MD (), ALEC HATCH ([id number]) on 2/2/2019 9:53:11 AM 
  
POC LACTIC ACID Collection Time: 02/01/19  1:28 PM  
Result Value Ref Range Lactic Acid (POC) 2.22 (H) 0.5 - 1.9 mmol/L INFLUENZA A & B AG (RAPID TEST) Collection Time: 02/01/19  3:59 PM  
Result Value Ref Range Influenza A Ag POSITIVE (A) NEG Influenza B Ag NEGATIVE  NEG Source NASOPHARYNGEAL    
CULTURE, BLOOD Collection Time: 02/01/19  8:40 PM  
Result Value Ref Range Special Requests: LEFT 
ARM Culture result: NO GROWTH AFTER 8 HOURS    
CULTURE, BLOOD Collection Time: 02/01/19  8:42 PM  
Result Value Ref Range Special Requests: RIGHT 
ARM Culture result: NO GROWTH AFTER 8 HOURS METABOLIC PANEL, BASIC Collection Time: 02/02/19  5:28 AM  
Result Value Ref Range Sodium 140 136 - 145 mmol/L Potassium 3.2 (L) 3.5 - 5.1 mmol/L Chloride 108 (H) 98 - 107 mmol/L  
 CO2 25 21 - 32 mmol/L Anion gap 7 7 - 16 mmol/L Glucose 98 65 - 100 mg/dL BUN 15 8 - 23 MG/DL Creatinine 0.93 0.6 - 1.0 MG/DL  
 GFR est AA >60 >60 ml/min/1.73m2 GFR est non-AA >60 >60 ml/min/1.73m2 Calcium 8.0 (L) 8.3 - 10.4 MG/DL  
CBC WITH AUTOMATED DIFF Collection Time: 02/02/19  5:28 AM  
Result Value Ref Range WBC 10.1 4.3 - 11.1 K/uL  
 RBC 3.66 (L) 4.05 - 5.2 M/uL HGB 8.6 (L) 11.7 - 15.4 g/dL HCT 29.6 (L) 35.8 - 46.3 % MCV 80.9 79.6 - 97.8 FL  
 MCH 23.5 (L) 26.1 - 32.9 PG  
 MCHC 29.1 (L) 31.4 - 35.0 g/dL  
 RDW 16.2 (H) 11.9 - 14.6 % PLATELET 867 229 - 469 K/uL MPV 10.5 9.4 - 12.3 FL ABSOLUTE NRBC 0.00 0.0 - 0.2 K/uL  
 DF AUTOMATED NEUTROPHILS 78 43 - 78 % LYMPHOCYTES 12 (L) 13 - 44 % MONOCYTES 9 4.0 - 12.0 % EOSINOPHILS 0 (L) 0.5 - 7.8 % BASOPHILS 0 0.0 - 2.0 % IMMATURE GRANULOCYTES 1 0.0 - 5.0 %  
 ABS. NEUTROPHILS 7.9 1.7 - 8.2 K/UL  
 ABS. LYMPHOCYTES 1.2 0.5 - 4.6 K/UL  
 ABS. MONOCYTES 0.9 0.1 - 1.3 K/UL  
 ABS. EOSINOPHILS 0.0 0.0 - 0.8 K/UL  
 ABS. BASOPHILS 0.0 0.0 - 0.2 K/UL  
 ABS. IMM. GRANS. 0.1 0.0 - 0.5 K/UL Assessment/ Plan:  
 
Principal Problem: 
  Influenza A (H5N1) (2/1/2019) Active Problems: S/P CABG (coronary artery bypass graft) (9/10/2009) Overview: LIMA-LAD, SVG-RI, SVG-PDA Chronic systolic HF (heart failure) ([facility] (3/17/2016) Overview: 0/2009 - EF 30-35%. 02/2010 - Single leadICD - [employer] 02/2012:  EF 45-50% 03/2013:  EF 40-45% 09/2013:  EF 45% 09/2014:  EF 45% 09/2015:  EF 40-45% 09/2016:  EF 50-55% 01/2018:  EF 50% Benign essential hypertension (7/12/2016) Pure hypercholesterolemia (7/12/2016) Hypoxia (2/1/2019) Since more SOB today, will order breathing treatments and monitor O2 saturations. No pneumonia on chest x ray or CT chest yesterday. Add albuterol nebulizer as needed. Look to [employer] tomorrow HTN- amlodipine 5mg, carvedilol 25mg BID, lisinopril 40mg daily. Continue Tamiflu for Influenza A. DVT prophylaxis - add heparin Signed By: Denny Duarte, DO February 2, 2019

## 2019-02-02 NOTE — PROGRESS NOTES
Attempted to walk pt for oxygen qualifier. She refused at this time. She said she needed to bathe and use the bed pan first.  Will make another attempt this afternoon.

## 2019-02-02 NOTE — PROGRESS NOTES
02/02/19 1038 Activity and Safety Activity Level Up ad baljinder Ambulate Ambulate in room Activity In bed;Watching t.v.  
Activity Assistance No assistance needed Repositioned Head of bed elevated (degrees) Patient Turned Turns self Head of Bed Elevated Self regulated Assistive Device Fall prevention device Safety Measures Bed in low position;Bed/Chair alarm on;Bed/Chair-Wheels locked Hygiene and Comfort Hygiene Self care;Gown changed Type of Bath Chlorhexidine (CHG) Hygiene Assistance None Comfort Lights dim

## 2019-02-03 PROCEDURE — 94640 AIRWAY INHALATION TREATMENT: CPT

## 2019-02-03 PROCEDURE — 94760 N-INVAS EAR/PLS OXIMETRY 1: CPT

## 2019-02-03 PROCEDURE — 96367 TX/PROPH/DG ADDL SEQ IV INF: CPT

## 2019-02-03 PROCEDURE — 74011000250 HC RX REV CODE- 250: Performed by: INTERNAL MEDICINE

## 2019-02-03 PROCEDURE — 99218 HC RM OBSERVATION: CPT

## 2019-02-03 PROCEDURE — 96372 THER/PROPH/DIAG INJ SC/IM: CPT

## 2019-02-03 PROCEDURE — 77030020256 HC SOL INJ NACL 0.9%  500ML

## 2019-02-03 PROCEDURE — 96366 THER/PROPH/DIAG IV INF ADDON: CPT

## 2019-02-03 PROCEDURE — 74011250637 HC RX REV CODE- 250/637: Performed by: EMERGENCY MEDICINE

## 2019-02-03 PROCEDURE — 74011250636 HC RX REV CODE- 250/636: Performed by: FAMILY MEDICINE

## 2019-02-03 PROCEDURE — 74011250637 HC RX REV CODE- 250/637: Performed by: INTERNAL MEDICINE

## 2019-02-03 RX ORDER — IPRATROPIUM BROMIDE AND ALBUTEROL SULFATE 2.5; .5 MG/3ML; MG/3ML
3 SOLUTION RESPIRATORY (INHALATION)
Status: DISCONTINUED | OUTPATIENT
Start: 2019-02-03 | End: 2019-02-04 | Stop reason: HOSPADM

## 2019-02-03 RX ORDER — POTASSIUM CHLORIDE 20 MEQ/1
40 TABLET, EXTENDED RELEASE ORAL
Status: DISCONTINUED | OUTPATIENT
Start: 2019-02-03 | End: 2019-02-03

## 2019-02-03 RX ORDER — POTASSIUM CHLORIDE 14.9 MG/ML
20 INJECTION INTRAVENOUS
Status: COMPLETED | OUTPATIENT
Start: 2019-02-03 | End: 2019-02-04

## 2019-02-03 RX ADMIN — HEPARIN SODIUM 5000 UNITS: 5000 INJECTION INTRAVENOUS; SUBCUTANEOUS at 23:15

## 2019-02-03 RX ADMIN — POTASSIUM CHLORIDE 20 MEQ: 14.9 INJECTION, SOLUTION INTRAVENOUS at 22:14

## 2019-02-03 RX ADMIN — HEPARIN SODIUM 5000 UNITS: 5000 INJECTION INTRAVENOUS; SUBCUTANEOUS at 00:59

## 2019-02-03 RX ADMIN — Medication 10 ML: at 21:27

## 2019-02-03 RX ADMIN — ASPIRIN 81 MG: 81 TABLET, COATED ORAL at 09:44

## 2019-02-03 RX ADMIN — POTASSIUM CHLORIDE 20 MEQ: 14.9 INJECTION, SOLUTION INTRAVENOUS at 20:01

## 2019-02-03 RX ADMIN — Medication 10 ML: at 16:24

## 2019-02-03 RX ADMIN — IPRATROPIUM BROMIDE AND ALBUTEROL SULFATE 3 ML: .5; 3 SOLUTION RESPIRATORY (INHALATION) at 20:00

## 2019-02-03 RX ADMIN — IPRATROPIUM BROMIDE AND ALBUTEROL SULFATE 3 ML: .5; 3 SOLUTION RESPIRATORY (INHALATION) at 23:35

## 2019-02-03 RX ADMIN — Medication 5 ML: at 06:03

## 2019-02-03 RX ADMIN — HEPARIN SODIUM 5000 UNITS: 5000 INJECTION INTRAVENOUS; SUBCUTANEOUS at 11:28

## 2019-02-03 RX ADMIN — OSELTAMIVIR PHOSPHATE 30 MG: 30 CAPSULE ORAL at 17:00

## 2019-02-03 RX ADMIN — VITAMIN D, TAB 1000IU (100/BT) 5000 UNITS: 25 TAB at 09:46

## 2019-02-03 RX ADMIN — ROSUVASTATIN CALCIUM 20 MG: 20 TABLET, COATED ORAL at 09:44

## 2019-02-03 RX ADMIN — OSELTAMIVIR PHOSPHATE 30 MG: 30 CAPSULE ORAL at 09:47

## 2019-02-03 NOTE — PROGRESS NOTES
02/03/19 3882 Assessment  Type Assessment Type Shift assessment Activity and Safety Activity Level Up ad baljinder Activity In bed;Resting quietly Activity Assistance No assistance needed Repositioned Head of bed elevated (degrees) Patient Turned Turns self Head of Bed Elevated Self regulated Assistive Device Fall prevention device Safety Measures Bed/Chair-Wheels locked; Bed in low position;Call light within reach;Gripper socks; Side rails X2 Psychosocial  
Psychosocial (WDL) WDL Purposeful Interaction Yes Pt Identified Daily Priority Clinical issues (comment) Caring Interventions Reassure Reassure Therapeutic listening; Informing;Caring rounds Caritas Process Nurture loving kindness EENT  
EENT (WDL) WDL Visual Impairment None Respiratory Respiratory (WDL) X Respiratory Pattern Regular Upper Airway Sounds Coarse Chest/Tracheal Assessment Chest expansion, symmetrical  
Breath Sounds Bilateral Clear Cardiac Cardiac (WDL) X  
B/P Outside of Defined Limits No  
Cardiac Regularity Regular Cardiac/Telemetry Monitor On Yes Cardiac/Telemetry Alarm Audible Remote Alarms Set Remote VTE Prophylaxis (Shift Required Documentation) Mechanical VTE Orders No  
Peripheral Vascular Peripheral Vascular (WDL) WDL Abdominal   
Abdominal (WDL) WDL Last Bowel Movement Date 02/02/19 Abdominal Assessment Semi-soft; Intact Appetite Good Bowel Sounds Active Genitourinary Genitourinary (WDL) WDL Urine Assessment Urinary Status Voiding; Bathroom privileges Musculoskeletal  
LUE Weakness LLE Weakness RUE Weakness RLE Weakness Skin Integumentary Skin Integumentary (WDL) WDL Skin Color Appropriate for ethnicity Skin Condition/Temp Dry; Warm  
Skin Integrity Intact Turgor Non-tenting Hair Growth Present Wound Prevention and Protection Methods Orientation of Wound Prevention Posterior Location of Wound Prevention Sacrum/Coccyx Dressing Present  No  
Wound Offloading (Prevention Methods) Bed, pressure reduction mattress;Pillows;Repositioning Neuro Neuro (WDL) WDL Pulmonary Toilet Pulmonary Toilet H. O.B elevated Musculoskeletal  
Musculoskeletal (WDL) X

## 2019-02-03 NOTE — PROGRESS NOTES
Pt is stable. O2 sats were 95 on room air, taken off NC O2. Pt will discuss discharge tomorrow with MD. Denies pain. No other changes since last assessment. Bed in low position and call light in reach. Pt is stable, will continue to monitor.

## 2019-02-03 NOTE — PROGRESS NOTES
Received bedside shift report from Sharee Bush RN. Pt lying in bed. No apparent distress. Respirations even and unlabored. Instructed to call for assistance with needs, as they arise. Pt voiced understanding.

## 2019-02-04 VITALS
SYSTOLIC BLOOD PRESSURE: 96 MMHG | DIASTOLIC BLOOD PRESSURE: 63 MMHG | HEART RATE: 94 BPM | BODY MASS INDEX: 22.16 KG/M2 | RESPIRATION RATE: 18 BRPM | HEIGHT: 65 IN | OXYGEN SATURATION: 91 % | TEMPERATURE: 97.9 F | WEIGHT: 133 LBS

## 2019-02-04 LAB
ANION GAP SERPL CALC-SCNC: 6 MMOL/L (ref 7–16)
BUN SERPL-MCNC: 8 MG/DL (ref 8–23)
CALCIUM SERPL-MCNC: 8.5 MG/DL (ref 8.3–10.4)
CHLORIDE SERPL-SCNC: 106 MMOL/L (ref 98–107)
CO2 SERPL-SCNC: 28 MMOL/L (ref 21–32)
CREAT SERPL-MCNC: 0.7 MG/DL (ref 0.6–1)
ERYTHROCYTE [DISTWIDTH] IN BLOOD BY AUTOMATED COUNT: 16 % (ref 11.9–14.6)
GLUCOSE SERPL-MCNC: 110 MG/DL (ref 65–100)
HCT VFR BLD AUTO: 29 % (ref 35.8–46.3)
HGB BLD-MCNC: 8.5 G/DL (ref 11.7–15.4)
MCH RBC QN AUTO: 23.7 PG (ref 26.1–32.9)
MCHC RBC AUTO-ENTMCNC: 29.3 G/DL (ref 31.4–35)
MCV RBC AUTO: 80.8 FL (ref 79.6–97.8)
NRBC # BLD: 0 K/UL (ref 0–0.2)
PLATELET # BLD AUTO: 160 K/UL (ref 150–450)
PMV BLD AUTO: 10.7 FL (ref 9.4–12.3)
POTASSIUM SERPL-SCNC: 3.1 MMOL/L (ref 3.5–5.1)
RBC # BLD AUTO: 3.59 M/UL (ref 4.05–5.2)
SODIUM SERPL-SCNC: 140 MMOL/L (ref 136–145)
WBC # BLD AUTO: 4.4 K/UL (ref 4.3–11.1)

## 2019-02-04 PROCEDURE — 99218 HC RM OBSERVATION: CPT

## 2019-02-04 PROCEDURE — 74011250637 HC RX REV CODE- 250/637: Performed by: INTERNAL MEDICINE

## 2019-02-04 PROCEDURE — 80048 BASIC METABOLIC PNL TOTAL CA: CPT

## 2019-02-04 PROCEDURE — 77010033678 HC OXYGEN DAILY

## 2019-02-04 PROCEDURE — 85027 COMPLETE CBC AUTOMATED: CPT

## 2019-02-04 PROCEDURE — 94640 AIRWAY INHALATION TREATMENT: CPT

## 2019-02-04 PROCEDURE — 74011250637 HC RX REV CODE- 250/637: Performed by: EMERGENCY MEDICINE

## 2019-02-04 PROCEDURE — 94760 N-INVAS EAR/PLS OXIMETRY 1: CPT

## 2019-02-04 PROCEDURE — 77030020120 HC VLV RESP PEP HI -B

## 2019-02-04 PROCEDURE — 94761 N-INVAS EAR/PLS OXIMETRY MLT: CPT

## 2019-02-04 PROCEDURE — 36415 COLL VENOUS BLD VENIPUNCTURE: CPT

## 2019-02-04 PROCEDURE — 74011000250 HC RX REV CODE- 250: Performed by: INTERNAL MEDICINE

## 2019-02-04 RX ORDER — IPRATROPIUM BROMIDE AND ALBUTEROL SULFATE 2.5; .5 MG/3ML; MG/3ML
3 SOLUTION RESPIRATORY (INHALATION)
Qty: 30 NEBULE | Refills: 0 | Status: SHIPPED | OUTPATIENT
Start: 2019-02-04 | End: 2019-09-25 | Stop reason: ALTCHOICE

## 2019-02-04 RX ORDER — OSELTAMIVIR PHOSPHATE 30 MG/1
30 CAPSULE ORAL 2 TIMES DAILY
Qty: 4 CAP | Refills: 0 | Status: SHIPPED | OUTPATIENT
Start: 2019-02-04 | End: 2019-02-06

## 2019-02-04 RX ORDER — POTASSIUM CHLORIDE 20 MEQ/1
40 TABLET, EXTENDED RELEASE ORAL
Status: COMPLETED | OUTPATIENT
Start: 2019-02-04 | End: 2019-02-04

## 2019-02-04 RX ORDER — POTASSIUM CHLORIDE 20 MEQ/1
20 TABLET, EXTENDED RELEASE ORAL DAILY
Qty: 5 TAB | Refills: 0 | Status: SHIPPED | OUTPATIENT
Start: 2019-02-04 | End: 2019-02-09

## 2019-02-04 RX ADMIN — CARVEDILOL 25 MG: 25 TABLET, FILM COATED ORAL at 08:07

## 2019-02-04 RX ADMIN — VITAMIN D, TAB 1000IU (100/BT) 5000 UNITS: 25 TAB at 08:07

## 2019-02-04 RX ADMIN — IPRATROPIUM BROMIDE AND ALBUTEROL SULFATE 3 ML: .5; 3 SOLUTION RESPIRATORY (INHALATION) at 10:55

## 2019-02-04 RX ADMIN — LISINOPRIL 40 MG: 20 TABLET ORAL at 08:07

## 2019-02-04 RX ADMIN — IPRATROPIUM BROMIDE AND ALBUTEROL SULFATE 3 ML: .5; 3 SOLUTION RESPIRATORY (INHALATION) at 07:35

## 2019-02-04 RX ADMIN — Medication 5 ML: at 08:07

## 2019-02-04 RX ADMIN — POTASSIUM CHLORIDE 40 MEQ: 20 TABLET, EXTENDED RELEASE ORAL at 12:47

## 2019-02-04 RX ADMIN — OSELTAMIVIR PHOSPHATE 30 MG: 30 CAPSULE ORAL at 08:09

## 2019-02-04 RX ADMIN — AMLODIPINE BESYLATE 5 MG: 5 TABLET ORAL at 08:08

## 2019-02-04 RX ADMIN — ASPIRIN 81 MG: 81 TABLET, COATED ORAL at 08:08

## 2019-02-04 RX ADMIN — ROSUVASTATIN CALCIUM 20 MG: 20 TABLET, COATED ORAL at 08:08

## 2019-02-04 RX ADMIN — IPRATROPIUM BROMIDE AND ALBUTEROL SULFATE 3 ML: .5; 3 SOLUTION RESPIRATORY (INHALATION) at 03:46

## 2019-02-04 NOTE — DISCHARGE SUMMARY
Hospitalist Discharge Summary     Patient ID:  Belkys Quevedo  [id number]  58 y.o.  1945  Admit date: 2/1/2019  2:36 PM  Discharge date and time: 2/4/2019  Attending: Lam Porter MD  PCP:  Steph Mcdaniel MD  Treatment Team: Attending Provider: Lam Porter MD; Utilization Review: Ladonna Skiff, RN; Care Manager: Brigitte Delgadillo RN; Utilization Review: Rand Chimera    Principal Diagnosis Influenza A (H5N1)   Principal Problem:    Influenza A (H5N1) (2/1/2019)    Active Problems:    S/P CABG (coronary artery bypass graft) (9/10/2009)      Overview: LIMA-LAD, SVG-RI, SVG-PDA      [facility] (3/17/2016)      Overview: 0/2009 - EF 30-35%. 02/2010 - Single leadICD - [employer]      02/2012:  EF 45-50%      03/2013:  EF 40-45%      09/2013:  EF 45%      09/2014:  EF 45%      09/2015:  EF 40-45%       09/2016:  EF 50-55%      01/2018:  EF 50%      Benign essential hypertension (7/12/2016)      Pure hypercholesterolemia (7/12/2016)      Hypoxia (2/1/2019)             Hospital Course:  Please refer to the admission H&P for details of presentation. In summary, the patient is 73F with pmhx of chronic systolic CHF EF 30%, HTn, HLP, CAD presents for report of near syncope, malaise and poor appetite. Found pos influenza A. Started on breathing treatments, tamiflu with reported improvement in cough, malaise. Says she is feeling much better. Has continued congestion from flu now with low oxygen reguirement with ambulation. Will discharge with 1L NC with exertion. Continue tamiflu. CT chest was negative for infiltrates, edema or PE. Significant Diagnostic Studies:   Final [99] 2/01/2019 15:58 2/01/2019 16:07   Study Result     Chest CT, with intravenous contrast 2/1/2019.     Indication:  Shortness of breath and weakness.     Comparison: Chest CT 8/31/2009. Chest x-ray today. .     Technique:  Spiral images through the chest with 100 cc Isovue 370.   IV contrast  was used to evaluate the pulmonary arterial tree. Coronal reconstructed images  also reviewed. Radiation dose reduction techniques were used for this study. Our CT scanners use one or all of the following: Automated exposure control,  adjustment of the mA and/or kV according to patient size, iterative  reconstruction.     The pulmonary arterial tree is adequately opacified. There are no central or  convincing distal emboli. No evidence for aortic dissection. Post median  sternotomy changes. Pacing device. Mitral valve replacement. Native coronary  artery calcification. No mediastinal hilar or axillary lymphadenopathy. 6 mm  left lower lobe pulmonary nodule unchanged from prior therefore considered  benign. There is no alveolar consolidation. Lobular emphysematous changes.     IMPRESSION  IMPRESSION:      1. No central or convincing distal emboli. 2. No acute finding in the chest.  3. Stable left lung nodule is therefore considered benign.            Labs: Results:       Chemistry Recent Labs     02/04/19 0918 02/02/19 0528 02/01/19  1323   * 98 113*    140 140   K 3.1* 3.2* 3.5    108* 108*   CO2 28 25 22   BUN 8 15 15   CREA 0.70 0.93 1.00   CA 8.5 8.0* 9.1   AGAP 6* 7 10   AP  --   --  55   TP  --   --  8.3*   ALB  --   --  3.7   GLOB  --   --  4.6*   AGRAT  --   --  0.8*      CBC w/Diff Recent Labs     02/04/19 0918 02/02/19 0528 02/01/19  1323   WBC 4.4 10.1 13.5*   RBC 3.59* 3.66* 4.11   HGB 8.5* 8.6* 9.7*   HCT 29.0* 29.6* 32.6*    158 180   GRANS  --  78 87*   LYMPH  --  12* 7*   EOS  --  0* 0*      Cardiac Enzymes No results for input(s): CPK, CKND1, RENAE in the last 72 hours. No lab exists for component: CKRMB, TROIP   Coagulation No results for input(s): PTP, INR, APTT in the last 72 hours.     No lab exists for component: INREXT    Lipid Panel Lab Results   Component Value Date/Time    Cholesterol, total 130 02/28/2014 10:33 AM    HDL Cholesterol 48 02/28/2014 10:33 AM    LDL, calculated 70.2 02/28/2014 10:33 AM    VLDL, calculated 11.8 02/28/2014 10:33 AM    Triglyceride 59 02/28/2014 10:33 AM    CHOL/HDL Ratio 2.7 02/28/2014 10:33 AM      BNP No results for input(s): BNPP in the last 72 hours. Liver Enzymes Recent Labs     02/01/19  1323   TP 8.3*   ALB 3.7   AP 55   SGOT 40*      Thyroid Studies No results found for: T4, T3U, TSH, TSHEXT         Discharge Exam:  Visit Vitals  BP 94/59 (BP 1 Location: Left arm, BP Patient Position: At rest)   Pulse 82   Temp 99.1 °F (37.3 °C)   Resp 18   Ht 5' 5\" (1.651 m)   Wt 60.3 kg (133 lb)   SpO2 91%   Breastfeeding? No   BMI 22.13 kg/m²     General appearance: alert, cooperative, no distress, appears stated age  Lungs: scant scattered rhonchi no wheezing  Heart: regular rate and rhythm, S1, S2 normal, no murmur, click, rub or gallop  Abdomen: soft, non-tender. Bowel sounds normal. No masses,  no organomegaly  Extremities: no cyanosis or edema  Neurologic: Grossly normal    Disposition: home  Discharge Condition: stable  Patient Instructions:   Current Discharge Medication List      START taking these medications    Details   albuterol-ipratropium (DUO-NEB) 2.5 mg-0.5 mg/3 ml nebu 3 mL by Nebulization route every four (4) hours as needed. Qty: 30 Nebule, Refills: 0      oseltamivir (TAMIFLU) 30 mg capsule Take 1 Cap by mouth two (2) times a day for 2 days. Qty: 4 Cap, Refills: 0         CONTINUE these medications which have NOT CHANGED    Details   rosuvastatin (CRESTOR) 20 mg tablet Take 1 Tab by mouth daily. Qty: 90 Tab, Refills: 3    Associated Diagnoses: Pure hypercholesterolemia      lisinopril (PRINIVIL, ZESTRIL) 40 mg tablet Take 1 Tab by mouth daily. Qty: 90 Tab, Refills: 3    Associated Diagnoses: Benign essential hypertension      carvedilol (COREG) 25 mg tablet Take 1 Tab by mouth two (2) times daily (with meals).   Qty: 180 Tab, Refills: 3    Associated Diagnoses: Benign essential hypertension      amLODIPine (NORVASC) 5 mg tablet Take 1 Tab by mouth daily. Qty: 90 Tab, Refills: 3    Associated Diagnoses: Benign essential hypertension      cholecalciferol, VITAMIN D3, (VITAMIN D3) 5,000 unit tab tablet Take 5,000 Units by mouth daily. aspirin delayed-release 81 mg tablet take 1 Tab by mouth daily.   Qty: 30 Each, Refills: prn             Activity: as tolerated  Diet: cardiac      Follow-up  ·   PCP in 1 week  Time spent to discharge patient 35 minutes  Signed:  Selma Jeffery DO  2/4/2019  11:57 AM

## 2019-02-04 NOTE — PROGRESS NOTES
Patient discharged in stable condition via wheelchair to private automobile. Respirations even and unlabored. No acute distress. Patient accompanied by family member. No further needs noted.

## 2019-02-04 NOTE — PROGRESS NOTES
Hospitalist Progress Note 2/3/2019 Admit Date: 2019  2:36 PM  
NAME: Juan Telles :  1945 MRN:  [id number] Attending: Mati Faye MD 
PCP:  Aryan Gutiérrez MD 
 
SUBJECTIVE:  
Patient 31B with pmhx of chronic systolic CHF EF 46%, HTn, HLP, CAD presents for report of near syncope, malaise and poor appetite. Found pos influenza A. Newly 02 requirement. /3 - pt states she is feeling better. Less cough. Removed o2 and pt desats to 86%. Review of Systems negative with exception of pertinent positives noted above PHYSICAL EXAM  
 
Visit Vitals /50 Pulse 93 Temp 99.7 °F (37.6 °C) Resp 19 Ht 5' 5\" (1.651 m) Wt 60.9 kg (134 lb 4.8 oz) SpO2 95% Breastfeeding? No  
BMI 22.35 kg/m² Temp (24hrs), Av.4 °F (37.4 °C), Min:98.8 °F (37.1 °C), Max:99.9 °F (37.7 °C) Oxygen Therapy O2 Sat (%): 95 % (19 1459) Pulse via Oximetry: 75 beats per minute (19 1608) O2 Device: Nasal cannula (19 1608) O2 Flow Rate (L/min): 2 l/min (19 1608) Intake/Output Summary (Last 24 hours) at 2/3/2019 1907 Last data filed at 2/3/2019 1526 Gross per 24 hour Intake 760 ml Output  Net 760 ml General: No acute distress   
Lungs:  Diminished bilaterally Heart:  Regular rate and rhythm,  No murmur, rub, or gallop Abdomen: Soft, Non distended, Non tender, Positive bowel sounds Extremities: No cyanosis, clubbing or edema Neurologic:  No focal deficits ASSESSMENT Active Hospital Problems Diagnosis Date Noted  Hypoxia 2019  Influenza A (H5N1) 2019  Benign essential hypertension 2016  Pure hypercholesterolemia 2016  Chronic systolic HF (heart failure) ([facility] 2016 - EF 30-35%. 2010 - Single leadICD - [employer] 2012:  EF 45-50% 2013:  EF 40-45% 2013:  EF 45% 2014:  EF 45% 2015:  EF 40-45% 2016:  EF 50-55% 2018:  EF 50%  S/P CABG (coronary artery bypass graft) 09/10/2009 LIMA-LAD, SVG-RI, SVG-PDA 
  
 
A/P Influenza A Cont  Tamiflu BID x 5 days (eot 02/06), Supportive therapy- 02 for sats > 90 Hypoxia No evidence infiltrates, PE or overload by Ct scan. Schedule duonebs, flutter valve. May need o2 for discharge - ambulatory o2 eval in AM 
 
Near syncope Without dizziness, likely r/t acute infection Remote tele  
  
Chronic systolic CHF EF 05% 
compensating Cont home meds Norvasc, Coreg, Lisinopril, ASA 
  
DC planning Likely home in 1-2 days DVT Prophylaxis: hep sq Signed By: Lavelle Chu DO February 3, 2019

## 2019-02-04 NOTE — PROGRESS NOTES
Bedside shift report received from Vendor, [address] [place] [address]. Patient alert and oriented x4 in no apparent distress. Respirations even and unlabored, bilateral breath sounds coarse. Patient reports no pain at this time. No needs expressed. Patient states she cannot take K-Clor pills due to size. MD called, order changed to 40mEq IV. Bed in low, locked position with call light and belongings in reach. Will continue to monitor.

## 2019-02-04 NOTE — PROGRESS NOTES
40 mEq IV potassium given overnight. Will pass on to oncoming RN to follow up with today's labs. Bedside shift report to be given to oncoming RN.

## 2019-02-04 NOTE — PROGRESS NOTES
Oxygen Qualifier Room air: SpO2 with O2 and liter flow Resting SpO2  92% Ambulating SpO2  86% 91% on 1L Completed by: 
 
Sol Ramos

## 2019-02-04 NOTE — DISCHARGE INSTRUCTIONS
Patient Education      Oxygen at 1 liter while ambulating  Patient Education        Oxygen Therapy: Care Instructions  Your Care Instructions    Oxygen therapy helps you get more oxygen into your lungs and bloodstream. You may use it if you have a disease that makes it hard to breathe, such as COPD, pulmonary fibrosis (scarring of the lungs), or heart failure. Oxygen therapy can make it easier for you to breathe and can reduce your heart's workload. Some people need extra oxygen all the time. Others need it from time to time throughout the day or overnight. A doctor will prescribe how much oxygen you need and how often to use it. To breathe the oxygen, most people use a nasal cannula (say \"MARISOL-yuh-armani\"). This is a thin tube with two prongs that fit just inside your nose. People who need a lot of oxygen may need to use a mask that fits over the nose and mouth. Follow-up care is a key part of your treatment and safety. Be sure to make and go to all appointments, and call your doctor if you are having problems. It's also a good idea to know your test results and keep a list of the medicines you take. How can you care for yourself at home? To help yourself  · Using oxygen may dry out your nose or lips. Use water-based lubricants on your lips or nostrils. Do not use an oil-based product like petroleum jelly. · If you use a nasal cannula, the tubing may rub under your nostrils and around your ears. To keep your skin from getting sore, tuck some gauze under the tubing. Use a water-based lotion on rubbed areas. · Do not use alcohol or take drugs that relax you, because they will slow your breathing rate. · Keep track of how much oxygen is in the tank, and reorder before it runs out. If a holiday is coming up or you expect bad weather, order in advance or make your regular order larger. · You may need extra oxygen when you travel to high altitudes or travel by plane. Ask your doctor about this.   · If you are getting oxygen directly to your windpipe through an opening in your neck, your doctor will teach you how to care for the equipment. To make sure oxygen is flowing  · Check the flow by holding your mask or cannula up to your ear and listening for the \"hiss\" of airflow. · If you have a nasal cannula, dip the prongs in a glass of water. If you see bubbles, oxygen is coming through. · Check your pressure gauge or contents indicator. · If you use an oxygen concentrator, make sure it is turned on and plugged in. If you use a cylinder, make sure the valve is open. · Look for kinks, blockages, or water in the tubing. Be sure the tubing is connected to the oxygen source. · Do not change your oxygen flow rate. Your doctor sets this at the correct level. Higher flow rates usually do not help and can increase the risk of harmful carbon dioxide buildup in the blood. To be safe  · Do not leave cords or tubing running across an area where you or someone else may trip on it. · Do not let oxygen containers get hot. Store them in a cool place where there is airflow. Do not leave them in a car trunk or a hot vehicle. · Keep oxygen containers upright. Make sure they do not fall over and get damaged. Try securing the tanks in a sturdy container or securing them with a rope or a chain. · Watch for signs of oxygen leaks. If you hear a loud hissing from your container or if it empties too fast, stay away from the container. Open windows right away and call the company that brought the oxygen system to your home. · Do not use oxygen around anything that could spark or easily cause a fire. ? Do not smoke or let others smoke while you are using oxygen. Put up \"no smoking\" signs in your home. ? Do not use oxygen near open flames, such as candles, fireplaces, gas stoves, or hot water heaters. Do not use it near electric razors, hair dryers, heating pads, or anything that may spark. ?  Keep a working fire extinguisher in your home where it is easy to get to.  ? If a fire starts, turn off the oxygen right away and leave the house. ? If you have an oxygen concentrator, do not use it if the cord looks damaged. Do not use an extension cord to plug it in. Do not plug it into an outlet that has other appliances plugged into it. To care for the equipment  · Follow the directions that come with the equipment for using and caring for it. · Wash your cannula or mask with a liquid soap and warm water 1 or 2 times a week. Replace them every 2 to 4 weeks. · If you have a cold, change the nasal prongs when your cold symptoms are done. · If you have an oxygen concentrator, unplug the unit and wipe down the cabinet with a damp cloth daily. Clean the air filter at least 2 times a week. Where can you learn more? Go to http://leannaChurchPairinglata.info/. Enter E117 in the search box to learn more about \"Oxygen Therapy: Care Instructions. \"  Current as of: September 5, 2018  Content Version: 11.9  © 3492-6400 [employer]. Care instructions adapted under license by [employer] (which disclaims liability or warranty for this information). If you have questions about a medical condition or this instruction, always ask your healthcare professional. [address] any warranty or liability for your use of this information. Influenza (Flu): Care Instructions  Your Care Instructions    Influenza (flu) is an infection in the lungs and breathing passages. It is caused by the influenza virus. There are different strains, or types, of the flu virus from year to year. Unlike the common cold, the flu comes on suddenly and the symptoms, such as a cough, congestion, fever, chills, fatigue, aches, and pains, are more severe. These symptoms may last up to 10 days. Although the flu can make you feel very sick, it usually doesn't cause serious health problems. Home treatment is usually all you need for flu symptoms.  But your doctor may prescribe antiviral medicine to prevent other health problems, such as pneumonia, from developing. Older people and those who have a long-term health condition, such as lung disease, are most at risk for having pneumonia or other health problems. Follow-up care is a key part of your treatment and safety. Be sure to make and go to all appointments, and call your doctor if you are having problems. It's also a good idea to know your test results and keep a list of the medicines you take. How can you care for yourself at home? · Get plenty of rest.  · Drink plenty of fluids, enough so that your urine is light yellow or clear like water. If you have kidney, heart, or liver disease and have to limit fluids, talk with your doctor before you increase the amount of fluids you drink. · Take an over-the-counter pain medicine if needed, such as acetaminophen (Tylenol), ibuprofen (Advil, Motrin), or naproxen (Aleve), to relieve fever, headache, and muscle aches. Read and follow all instructions on the label. No one younger than 20 should take aspirin. It has been linked to Reye syndrome, a serious illness. · Do not smoke. Smoking can make the flu worse. If you need help quitting, talk to your doctor about stop-smoking programs and medicines. These can increase your chances of quitting for good. · Breathe moist air from a hot shower or from a sink filled with hot water to help clear a stuffy nose. · Before you use cough and cold medicines, check the label. These medicines may not be safe for young children or for people with certain health problems. · If the skin around your nose and lips becomes sore, put some petroleum jelly on the area. · To ease coughing:  ? Drink fluids to soothe a scratchy throat. ? Suck on cough drops or plain hard candy. ? Take an over-the-counter cough medicine that contains dextromethorphan to help you get some sleep. Read and follow all instructions on the label.   ? Raise your head at night with an extra pillow. This may help you rest if coughing keeps you awake. · Take any prescribed medicine exactly as directed. Call your doctor if you think you are having a problem with your medicine. To avoid spreading the flu  · Wash your hands regularly, and keep your hands away from your face. · Stay home from school, work, and other public places until you are feeling better and your fever has been gone for at least 24 hours. The fever needs to have gone away on its own without the help of medicine. · Ask people living with you to talk to their doctors about preventing the flu. They may get antiviral medicine to keep from getting the flu from you. · To prevent the flu in the future, get a flu vaccine every fall. Encourage people living with you to get the vaccine. · Cover your mouth when you cough or sneeze. When should you call for help? Call 911 anytime you think you may need emergency care. For example, call if:    · You have severe trouble breathing.    Call your doctor now or seek immediate medical care if:    · You have new or worse trouble breathing.     · You seem to be getting much sicker.     · You feel very sleepy or confused.     · You have a new or higher fever.     · You get a new rash.    Watch closely for changes in your health, and be sure to contact your doctor if:    · You begin to get better and then get worse.     · You are not getting better after 1 week. Where can you learn more? Go to http://leanna-lata.info/. Enter W171 in the search box to learn more about \"Influenza (Flu): Care Instructions. \"  Current as of: September 5, 2018  Content Version: 11.9  © 3026-5402 [employer]. Care instructions adapted under license by [employer] (which disclaims liability or warranty for this information).  If you have questions about a medical condition or this instruction, always ask your healthcare professional. [employer] disclaims any warranty or liability for your use of this information. DISCHARGE SUMMARY from Nurse    PATIENT INSTRUCTIONS:    After general anesthesia or intravenous sedation, for 24 hours or while taking prescription Narcotics:  · Limit your activities  · Do not drive and operate hazardous machinery  · Do not make important personal or business decisions  · Do  not drink alcoholic beverages  · If you have not urinated within 8 hours after discharge, please contact your surgeon on call. Report the following to your surgeon:  · Excessive pain, swelling, redness or odor of or around the surgical area  · Temperature over 100.5  · Nausea and vomiting lasting longer than 4 hours or if unable to take medications  · Any signs of decreased circulation or nerve impairment to extremity: change in color, persistent  numbness, tingling, coldness or increase pain  · Any questions    What to do at Home:  *  Please give a list of your current medications to your Primary Care Provider. *  Please update this list whenever your medications are discontinued, doses are      changed, or new medications (including over-the-counter products) are added. *  Please carry medication information at all times in case of emergency situations. These are general instructions for a healthy lifestyle:    No smoking/ No tobacco products/ Avoid exposure to second hand smoke  Surgeon General's Warning:  Quitting smoking now greatly reduces serious risk to your health.     Obesity, smoking, and sedentary lifestyle greatly increases your risk for illness    A healthy diet, regular physical exercise & weight monitoring are important for maintaining a healthy lifestyle    You may be retaining fluid if you have a history of heart failure or if you experience any of the following symptoms:  Weight gain of 3 pounds or more overnight or 5 pounds in a week, increased swelling in our hands or feet or shortness of breath while lying flat in bed. Please call your doctor as soon as you notice any of these symptoms; do not wait until your next office visit. Recognize signs and symptoms of STROKE:    F-face looks uneven    A-arms unable to move or move unevenly    S-speech slurred or non-existent    T-time-call 911 as soon as signs and symptoms begin-DO NOT go       Back to bed or wait to see if you get better-TIME IS BRAIN. Warning Signs of HEART ATTACK     Call 911 if you have these symptoms:   Chest discomfort. Most heart attacks involve discomfort in the center of the chest that lasts more than a few minutes, or that goes away and comes back. It can feel like uncomfortable pressure, squeezing, fullness, or pain.  Discomfort in other areas of the upper body. Symptoms can include pain or discomfort in one or both arms, the back, neck, jaw, or stomach.  Shortness of breath with or without chest discomfort.  Other signs may include breaking out in a cold sweat, nausea, or lightheadedness. Don't wait more than five minutes to call 911 - MINUTES MATTER! Fast action can save your life. Calling 911 is almost always the fastest way to get lifesaving treatment. Emergency Medical Services staff can begin treatment when they arrive -- up to an hour sooner than if someone gets to the hospital by car. The discharge information has been reviewed with the patient. The patient verbalized understanding. Discharge medications reviewed with the patient and appropriate educational materials and side effects teaching were provided.   ___________________________________________________________________________________________________________________________________

## 2019-02-04 NOTE — PROGRESS NOTES
Patient is discharging home today. She requires home O2 with exertion. Home O2 arranged through [facility] for delivery prior to discharge. Case Management will remain available to assist as needed. Care Management Interventions PCP Verified by CM: Yes Transition of Care Consult (CM Consult): Discharge Planning Discharge Durable Medical Equipment: Yes(Home O2 through [facility] Physical Therapy Consult: No 
Occupational Therapy Consult: No 
Speech Therapy Consult: No 
Current Support Network: Own Home Confirm Follow Up Transport: Family Plan discussed with Pt/Family/Caregiver: Yes Freedom of Choice Offered: Yes Discharge Location Discharge Placement: Home

## 2019-02-04 NOTE — PROGRESS NOTES
Discharge instructions and prescriptions provided and explained to the pt. Medication information sheets for all new prescriptions given and reviewed with patient. Opportunity for questions provided. Waiting on oxygen and ride. Instructed to call once ready to leave.

## 2019-02-06 LAB
BACTERIA SPEC CULT: NORMAL
BACTERIA SPEC CULT: NORMAL
SERVICE CMNT-IMP: NORMAL
SERVICE CMNT-IMP: NORMAL

## 2019-02-13 ENCOUNTER — HOSPITAL ENCOUNTER (OUTPATIENT)
Dept: LAB | Age: 74
Discharge: HOME | End: 2019-02-13
Payer: MEDICARE

## 2019-02-13 DIAGNOSIS — I50.22 CHRONIC SYSTOLIC HF (HEART FAILURE) (HCC): Chronic | ICD-10-CM

## 2019-02-13 DIAGNOSIS — I25.118 ATHEROSCLEROSIS OF NATIVE CORONARY ARTERY OF NATIVE HEART WITH STABLE ANGINA PECTORIS (HCC): Chronic | ICD-10-CM

## 2019-02-13 PROBLEM — J43.1 PANLOBULAR EMPHYSEMA (HCC): Chronic | Status: ACTIVE | Noted: 2019-02-13

## 2019-02-13 LAB
ANION GAP SERPL CALC-SCNC: 5 MMOL/L
BASOPHILS # BLD: 0 K/UL (ref 0–0.2)
BASOPHILS NFR BLD: 0 % (ref 0–2)
BUN SERPL-MCNC: 13 MG/DL (ref 8–23)
CALCIUM SERPL-MCNC: 9.2 MG/DL (ref 8.3–10.4)
CHLORIDE SERPL-SCNC: 108 MMOL/L (ref 98–107)
CO2 SERPL-SCNC: 26 MMOL/L (ref 21–32)
CREAT SERPL-MCNC: 1 MG/DL (ref 0.6–1)
DIFFERENTIAL METHOD BLD: ABNORMAL
EOSINOPHIL # BLD: 0.2 K/UL (ref 0–0.8)
EOSINOPHIL NFR BLD: 2 % (ref 0.5–7.8)
ERYTHROCYTE [DISTWIDTH] IN BLOOD BY AUTOMATED COUNT: 16.3 % (ref 11.9–14.6)
GLUCOSE SERPL-MCNC: 102 MG/DL (ref 65–100)
HCT VFR BLD AUTO: 31.5 % (ref 35.8–46.3)
HGB BLD-MCNC: 9.3 G/DL (ref 11.7–15.4)
IMM GRANULOCYTES # BLD AUTO: 0 K/UL (ref 0–0.5)
IMM GRANULOCYTES NFR BLD AUTO: 1 % (ref 0–5)
LYMPHOCYTES # BLD: 1.8 K/UL (ref 0.5–4.6)
LYMPHOCYTES NFR BLD: 25 % (ref 13–44)
MCH RBC QN AUTO: 23.2 PG (ref 26.1–32.9)
MCHC RBC AUTO-ENTMCNC: 29.5 G/DL (ref 31.4–35)
MCV RBC AUTO: 78.6 FL (ref 79.6–97.8)
MONOCYTES # BLD: 0.6 K/UL (ref 0.1–1.3)
MONOCYTES NFR BLD: 9 % (ref 4–12)
NEUTS SEG # BLD: 4.7 K/UL (ref 1.7–8.2)
NEUTS SEG NFR BLD: 63 % (ref 43–78)
NRBC # BLD: 0 K/UL (ref 0–0.2)
PLATELET # BLD AUTO: 430 K/UL (ref 150–450)
PMV BLD AUTO: 9.4 FL (ref 9.4–12.3)
POTASSIUM SERPL-SCNC: 3.9 MMOL/L (ref 3.5–5.1)
RBC # BLD AUTO: 4.01 M/UL (ref 4.05–5.2)
SODIUM SERPL-SCNC: 139 MMOL/L (ref 136–145)
WBC # BLD AUTO: 7.4 K/UL (ref 4.3–11.1)

## 2019-02-13 PROCEDURE — 80048 BASIC METABOLIC PNL TOTAL CA: CPT

## 2019-02-13 PROCEDURE — 36415 COLL VENOUS BLD VENIPUNCTURE: CPT

## 2019-02-13 PROCEDURE — 85025 COMPLETE CBC W/AUTO DIFF WBC: CPT

## 2019-02-20 ENCOUNTER — HOSPITAL ENCOUNTER (OUTPATIENT)
Dept: CARDIAC CATH/INVASIVE PROCEDURES | Age: 74
Discharge: HOME | End: 2019-02-20
Attending: INTERNAL MEDICINE | Admitting: INTERNAL MEDICINE
Payer: MEDICARE

## 2019-02-20 VITALS
HEART RATE: 88 BPM | DIASTOLIC BLOOD PRESSURE: 59 MMHG | RESPIRATION RATE: 16 BRPM | OXYGEN SATURATION: 96 % | TEMPERATURE: 98.1 F | HEIGHT: 65 IN | BODY MASS INDEX: 21.83 KG/M2 | SYSTOLIC BLOOD PRESSURE: 122 MMHG | WEIGHT: 131 LBS

## 2019-02-20 PROCEDURE — 74011250636 HC RX REV CODE- 250/636

## 2019-02-20 PROCEDURE — C1769 GUIDE WIRE: HCPCS

## 2019-02-20 PROCEDURE — 77030004534 HC CATH ANGI DX INFN CARD -A

## 2019-02-20 PROCEDURE — 74011250636 HC RX REV CODE- 250/636: Performed by: INTERNAL MEDICINE

## 2019-02-20 PROCEDURE — 74011636320 HC RX REV CODE- 636/320: Performed by: INTERNAL MEDICINE

## 2019-02-20 PROCEDURE — C1894 INTRO/SHEATH, NON-LASER: HCPCS

## 2019-02-20 PROCEDURE — 74011000250 HC RX REV CODE- 250: Performed by: INTERNAL MEDICINE

## 2019-02-20 PROCEDURE — 99152 MOD SED SAME PHYS/QHP 5/>YRS: CPT

## 2019-02-20 PROCEDURE — 93459 L HRT ART/GRFT ANGIO: CPT

## 2019-02-20 PROCEDURE — 77030019569 HC BND COMPR RAD TERU -B

## 2019-02-20 PROCEDURE — 99153 MOD SED SAME PHYS/QHP EA: CPT

## 2019-02-20 RX ORDER — SODIUM CHLORIDE 9 MG/ML
250 INJECTION, SOLUTION INTRAVENOUS CONTINUOUS
Status: DISCONTINUED | OUTPATIENT
Start: 2019-02-20 | End: 2019-02-20 | Stop reason: HOSPADM

## 2019-02-20 RX ORDER — DIAZEPAM 5 MG/1
5 TABLET ORAL ONCE
Status: DISCONTINUED | OUTPATIENT
Start: 2019-02-20 | End: 2019-02-20 | Stop reason: HOSPADM

## 2019-02-20 RX ORDER — SODIUM CHLORIDE 0.9 % (FLUSH) 0.9 %
5-40 SYRINGE (ML) INJECTION EVERY 8 HOURS
Status: DISCONTINUED | OUTPATIENT
Start: 2019-02-20 | End: 2019-02-20 | Stop reason: HOSPADM

## 2019-02-20 RX ORDER — SODIUM CHLORIDE 9 MG/ML
75 INJECTION, SOLUTION INTRAVENOUS CONTINUOUS
Status: DISCONTINUED | OUTPATIENT
Start: 2019-02-20 | End: 2019-02-20 | Stop reason: HOSPADM

## 2019-02-20 RX ORDER — MIDAZOLAM HYDROCHLORIDE 1 MG/ML
.5-2 INJECTION, SOLUTION INTRAMUSCULAR; INTRAVENOUS
Status: DISCONTINUED | OUTPATIENT
Start: 2019-02-20 | End: 2019-02-20 | Stop reason: HOSPADM

## 2019-02-20 RX ORDER — SODIUM CHLORIDE 0.9 % (FLUSH) 0.9 %
5-40 SYRINGE (ML) INJECTION AS NEEDED
Status: DISCONTINUED | OUTPATIENT
Start: 2019-02-20 | End: 2019-02-20 | Stop reason: HOSPADM

## 2019-02-20 RX ORDER — METOPROLOL TARTRATE 5 MG/5ML
5 INJECTION INTRAVENOUS
Status: DISCONTINUED | OUTPATIENT
Start: 2019-02-20 | End: 2019-02-20 | Stop reason: HOSPADM

## 2019-02-20 RX ORDER — LIDOCAINE HYDROCHLORIDE 10 MG/ML
2-10 INJECTION INFILTRATION; PERINEURAL
Status: DISCONTINUED | OUTPATIENT
Start: 2019-02-20 | End: 2019-02-20 | Stop reason: HOSPADM

## 2019-02-20 RX ORDER — GUAIFENESIN 100 MG/5ML
81-324 LIQUID (ML) ORAL ONCE
Status: DISCONTINUED | OUTPATIENT
Start: 2019-02-20 | End: 2019-02-20 | Stop reason: HOSPADM

## 2019-02-20 RX ORDER — HEPARIN SODIUM 200 [USP'U]/100ML
2 INJECTION, SOLUTION INTRAVENOUS CONTINUOUS
Status: DISCONTINUED | OUTPATIENT
Start: 2019-02-20 | End: 2019-02-20 | Stop reason: HOSPADM

## 2019-02-20 RX ORDER — FENTANYL CITRATE 50 UG/ML
25-100 INJECTION, SOLUTION INTRAMUSCULAR; INTRAVENOUS
Status: DISCONTINUED | OUTPATIENT
Start: 2019-02-20 | End: 2019-02-20 | Stop reason: HOSPADM

## 2019-02-20 RX ADMIN — FENTANYL CITRATE 50 MCG: 50 INJECTION, SOLUTION INTRAMUSCULAR; INTRAVENOUS at 10:27

## 2019-02-20 RX ADMIN — LIDOCAINE HYDROCHLORIDE 2 ML: 10 INJECTION, SOLUTION INFILTRATION; PERINEURAL at 10:29

## 2019-02-20 RX ADMIN — IOPAMIDOL 110 ML: 755 INJECTION, SOLUTION INTRAVENOUS at 10:50

## 2019-02-20 RX ADMIN — METOPROLOL TARTRATE 5 MG: 1 INJECTION, SOLUTION INTRAVENOUS at 10:50

## 2019-02-20 RX ADMIN — HEPARIN SODIUM 2 ML/HR: 5000 INJECTION, SOLUTION INTRAVENOUS; SUBCUTANEOUS at 10:28

## 2019-02-20 RX ADMIN — MIDAZOLAM HYDROCHLORIDE 2 MG: 1 INJECTION, SOLUTION INTRAMUSCULAR; INTRAVENOUS at 10:27

## 2019-02-20 RX ADMIN — HEPARIN SODIUM 2 ML: 10000 INJECTION, SOLUTION INTRAVENOUS; SUBCUTANEOUS at 10:32

## 2019-02-20 RX ADMIN — SODIUM CHLORIDE 75 ML/HR: 900 INJECTION, SOLUTION INTRAVENOUS at 07:53

## 2019-02-20 NOTE — PROGRESS NOTES
Patient pre-assessment complete for [facility] poss with Dr Terry Butler scheduled for 19 at 9:30am, arrival time 7:30am. Patient verified using . Patient instructed to bring all home medications in labeled bottles on the day of procedure. NPO status reinforced. Patient informed to take a full dose aspirin 325mg  or 81 mg x 4 on the day of procedure. Instructed they can take all other medications excluding vitamins & supplements. Patient verbalizes understanding of all instructions & denies any questions at this time.

## 2019-02-20 NOTE — PROGRESS NOTES
Report received from [address] [place] [address] Procedural findings communicated. Intra procedural  medication administration reviewed. Progression of care discussed. Patient received into [address] 1 post sheath removal.  
 
Access site without bleeding or swelling yes Dressing dry and intact yes Patient instructed to limit movement to left upper extremity Routine post procedural vital signs and site assessment initiated yes

## 2019-02-20 NOTE — PROGRESS NOTES
TRANSFER - OUT REPORT: 
 
Verbal report given to Jules Rush RN(name) on Nunu Sires  being transferred to CPRU(unit) for routine progression of care Report consisted of patients Situation, Background, Assessment and  
Recommendations(SBAR). Information from the following report(s) Procedure Summary, MAR and Cardiac Rhythm NSR was reviewed with the receiving nurse. Lines:  
Peripheral IV 02/20/19 Right Antecubital (Active) Peripheral IV 02/20/19 Left;Posterior Hand (Active) Opportunity for questions and clarification was provided. Patient transported with: 
 Registered Nurse [facility] Dr Katie Farmer Diagnostic only Left radial access - TR band @ 1051 w/ 11 ml in band - site C/D/I Versed 2mg IV Fent 50mcg IV

## 2019-02-20 NOTE — PROGRESS NOTES
Left radial band removed after deflation completed. No bleeding or hematoma. Site redressed with sterile gauze covered with tegaderm.

## 2019-02-20 NOTE — PROCEDURES
[facility]  CARDIAC CATH    Name:  Ilsa Shaw  MR#:  [id number]  :  1945  ACCOUNT #:  [de-identified]  DATE OF SERVICE:  2019    PRIMARY CARDIOLOGIST:  Liana Alberts MD    PRIMARY CARE PHYSICIAN:  Dr. Douglas Viera    BRIEF HISTORY:  The patient is a 59-year-old female with history of myocardial  infarction and coronary artery bypass grafting and waxing and waning ejection  fraction over the last decade. The ejection fraction has been 30% to 35% and was  high as 50% and recently has changed back to 35% to 40%. This is a little bit  worrisome and does refer back for cardiac catheterization to assess for potential  ischemic mediated etiology of worsening ejection fraction. PROCEDURE:  After informed consent, she was prepped and draped in the usual sterile  fashion. The left wrist was infiltrated with lidocaine. Left radial artery was  accessed. A 6-[place] Islander sheath was advanced. A 5-[place] Islander LIMA catheter was utilized for  left internal mammary artery injection. A 5-[place] JL4, 3.5 was utilized for left  coronary artery injection. A 5-[place] Island[place] JR5 was utilized for right coronary artery  injection. A multipurpose catheter was utilized for saphenous vein graft injection,  the ramus intermedius saphenous vein graft injection of the posterior descending, and  left ventriculography. Isovue contrast was utilized. CONSCIOUS SEDATION:  Start time 10:20, end time 10:56. MEDICATIONS:  3 mg of Versed, 50 mcg of fentanyl. MONITORING RN:  Emilio Garcia RN    FINDINGS:  1. Left ventricle:  Normal left ventricular size with moderate to severe LV systolic  dysfunction. Ejection fraction 35%. There is no significant mitral regurgitation. No aortic valve gradient. Left ventricular end-diastolic pressure was measured at 13  mmHg. 2.  Left main:  Left main is moderate in size, trifurcates in LAD, ramus, and  circumflex vessels.   Appears angiographically normal.  3.  Left anterior descending coronary: It is a moderate-sized vessel. There is a  stent in the proximal portion which remains patent. There is 20% to 30% distal  stenosis present. The LIMA remains widely patent to the apical LAD. 4.  Ramus intermedius. Occluded. 5.  Left circumflex coronary: It is a small vessel. It gives rise to small obtuse  marginal, the entire system appears angiographically normal.  6.  Right coronary:  Occluded. 7.  Left internal mammary artery to the LAD:  This is a small mildly atretic vessel  that remains widely patent to the LAD with competitive flow distally. 8.  Saphenous vein graft to ramus intermedius. This is a large graft. Good proximal  takeoff. Good distal anastomosis, fills the large ramus intermedius well. 9.  Saphenous vein graft to the posterior descending. It is a large graft. Good  proximal takeoff. Good distal anastomosis, fills the PDA well and back up to  posterolateral branch. Successful hemostasis with pneumatic radial band. CONCLUSIONS:  1.  Moderate to severe LV systolic dysfunction. 2.  Stable atherosclerotic coronary artery disease with patent proximal LAD stenting,  3/3 grafts patent. No progression of coronary artery disease identified. RECOMMENDATIONS:  Continue ongoing medical management. Thank you for allowing us to participate in the care of this patient. For any  questions or concerns, please feel free to contact me.         MD KATIE [email or web address]  D:  02/20/2019 10:59  T:  02/20/2019 12:24  JOB #:  [id number] normal balance 76m with emphysema and end stage renal disease with short of breath and cough           Problem/Plan - 1:  ·  Problem: COPD exacerbation.  Plan: improved, to f/u pulm as outpatient    Problem/Plan - 2:  ·  Problem: ESRD (end stage renal disease).  Plan:  hemodialysis          Problem/Plan - 3:  ·  Problem: Essential hypertension.  Plan: clonidine.     Attending Attestation:   40 minutes spent on total dc encounter; more than 50% of the visit was spent counseling and/or coordinating care by the attending physician.

## 2019-02-20 NOTE — PROCEDURES
Brief Cardiac Procedure Note    Patient: Belkys Quevedo MRN: [id number]  SSN: [id number]    YOB: 1945  Age: 72 y.o. Sex: female      Date of Procedure: 2/20/2019     Pre-procedure Diagnosis: Congestive Heart Failure    Post-procedure Diagnosis: Coronary Artery Disease    Procedure: Left Heart Catheterization    Brief Description of Procedure: See note    Performed By: Dion Barrera MD     Assistants: None    Anesthesia: Moderate Sedation    Estimated Blood Loss: Less than 10 mL      Specimens: None    Implants: None    Findings:   LV:  EF 35%%  LM:  NML  LAD:  20-30% mid and distal  RI:  100%  LCx:  Small and NML  RCA:  100%  LIMA-LAD:  Patent  SVG-PDA:  Patent  SVG-RI:  Patent    Complications: None    Recommendations: Continue medical therapy.     Signed By: Dion Barrera MD     February 20, 2019

## 2019-02-20 NOTE — DISCHARGE INSTRUCTIONS
HEART CATHETERIZATION/ANGIOGRAPHY DISCHARGE INSTRUCTIONS    1. Check puncture site frequently for swelling or bleeding. If there is any bleeding, lie down and apply pressure over the area with a clean towel or washcloth. Notify your doctor for any redness, swelling, drainage, or oozing from the puncture site. Notify your doctor for any fever or chills. 2. If the extremity becomes cold, numb, or painful call Dr. Arabella Parmar at [phone].  3. Activity should be limited for the next 48 hours. Do not use your left arm for lifting, pushing, or pulling during this time. 4. You may resume your usual diet. Drink more fluids than usual.  5. Have a responsible person drive you home and stay with you for at least 24 hours after your heart catheterization/angiography. 6. You may remove bandage from your Left wrist in 24 hours. You may shower in 24 hours. No tub baths, hot tubs, or swimming for 1 week. Do not place any lotions, creams, powders, or ointments over puncture site for 1 week. You may place a clean band-aid over the puncture site each day for 5 days. Change daily. 7. Do not drive for 24 hours. I have read the above instructions and have had the opportunity to ask questions.       Patient: ________________________   Date: 2/20/2019    Witness: _______________________   Date: 2/20/2019

## 2019-02-20 NOTE — PROGRESS NOTES
Discharged to home accompanied by son. Left unit via wheelchair. Left radial site without bleeding or hematoma.

## 2019-02-20 NOTE — PROGRESS NOTES
Pt arrived, ambulated to room with no visible problems, planned C for Dr Mauricio Chavez. Consent signed, Procedure discussed with pt all questions answered voiced understanding. Medications and history discussed with pt. Pt prepped per ordersThe patient has a fraility score of 3-MANAGING WELL, based on ability to complete ADLs without assistance,increased symptoms on exertion, increased symptoms on exertion Patient took Aspirin 324mg  today at 0710 prior to arrival.

## 2019-05-10 ENCOUNTER — HOSPITAL ENCOUNTER (OUTPATIENT)
Dept: MAMMOGRAPHY | Age: 74
Discharge: HOME | End: 2019-05-10
Attending: FAMILY MEDICINE
Payer: MEDICARE

## 2019-05-10 DIAGNOSIS — M89.9 DISORDER OF BONE AND CARTILAGE: ICD-10-CM

## 2019-05-10 DIAGNOSIS — M94.9 DISORDER OF BONE AND CARTILAGE: ICD-10-CM

## 2019-05-10 PROCEDURE — 77080 DXA BONE DENSITY AXIAL: CPT

## 2019-07-15 ENCOUNTER — PATIENT OUTREACH (OUTPATIENT)
Dept: CASE MANAGEMENT | Age: 74
End: 2019-07-15

## 2019-07-19 ENCOUNTER — PATIENT OUTREACH (OUTPATIENT)
Dept: CASE MANAGEMENT | Age: 74
End: 2019-07-19

## 2019-07-19 NOTE — PROGRESS NOTES
Medication Adherence Outreach    Date/Time of Call:  7/19/2019     Name of medication and dosage: Rosuvastatin Calcium Tab 20 MG  Lisinopril Tab 40 MG     Does the patient know the purpose and dosage of medication? Yes   Are you getting a #30 day or #90 day supply of your medication? #90   Are you still taking this medication? If not, why? Yes   Transportation issues or any problems paying for the medication or other reason? No   What pharmacy are you using to fill your medication and do you know the last time that you got your medication filled? [address] [place] [address]   Last fill 5/1/2019   Are you having any side effects from taking your medication? No      Any other questions or concerns expressed by the patient. No   Comments:        Discussed Medication Adherence with patient-she stated that there are no barriers preventing her from obtaining or taking her medication. She is getting a 90 day refill from her PCP-No further outreach at this time.      Call Completed By:     Milli Jim LPN  Care Coordinator  [facility]  [phone]  [email or web address]

## 2019-10-14 PROBLEM — D50.0 IRON DEFICIENCY ANEMIA DUE TO CHRONIC BLOOD LOSS: Chronic | Status: ACTIVE | Noted: 2019-10-14

## 2020-03-02 ENCOUNTER — HOSPITAL ENCOUNTER (OUTPATIENT)
Dept: MAMMOGRAPHY | Age: 75
Discharge: HOME | End: 2020-03-02
Attending: FAMILY MEDICINE

## 2020-03-02 DIAGNOSIS — Z12.31 VISIT FOR SCREENING MAMMOGRAM: ICD-10-CM

## 2021-04-12 ENCOUNTER — HOSPITAL ENCOUNTER (OUTPATIENT)
Dept: MAMMOGRAPHY | Age: 76
Discharge: HOME | End: 2021-04-12
Attending: NURSE PRACTITIONER

## 2021-04-12 DIAGNOSIS — Z12.31 ENCOUNTER FOR SCREENING MAMMOGRAM FOR MALIGNANT NEOPLASM OF BREAST: ICD-10-CM

## 2022-03-18 PROBLEM — J09.X2 INFLUENZA A (H5N1): Status: ACTIVE | Noted: 2019-02-01

## 2022-03-18 PROBLEM — R09.02 HYPOXIA: Status: ACTIVE | Noted: 2019-02-01

## 2022-03-19 PROBLEM — J43.1 PANLOBULAR EMPHYSEMA (HCC): Status: ACTIVE | Noted: 2019-02-13

## 2022-03-19 PROBLEM — D50.0 IRON DEFICIENCY ANEMIA DUE TO CHRONIC BLOOD LOSS: Status: ACTIVE | Noted: 2019-10-14

## 2022-06-02 ENCOUNTER — HOSPITAL ENCOUNTER (OUTPATIENT)
Dept: MAMMOGRAPHY | Age: 77
Discharge: HOME | End: 2022-06-05
Payer: MEDICARE

## 2022-06-02 DIAGNOSIS — Z12.31 VISIT FOR SCREENING MAMMOGRAM: ICD-10-CM

## 2022-06-02 PROCEDURE — 77067 SCR MAMMO BI INCL CAD: CPT

## 2022-06-27 DIAGNOSIS — Z00.00 LABORATORY EXAMINATION ORDERED AS PART OF A COMPLETE PHYSICAL EXAMINATION: Primary | ICD-10-CM

## 2022-06-27 DIAGNOSIS — E55.9 VITAMIN D DEFICIENCY: ICD-10-CM

## 2022-06-28 DIAGNOSIS — E55.9 VITAMIN D DEFICIENCY: ICD-10-CM

## 2022-06-28 DIAGNOSIS — Z00.00 LABORATORY EXAMINATION ORDERED AS PART OF A COMPLETE PHYSICAL EXAMINATION: ICD-10-CM

## 2022-06-28 LAB
25(OH)D3 SERPL-MCNC: 42.9 NG/ML (ref 30–100)
ALBUMIN SERPL-MCNC: 3.9 G/DL (ref 3.2–4.6)
ALBUMIN/GLOB SERPL: 1 {RATIO} (ref 1.2–3.5)
ALP SERPL-CCNC: 58 U/L (ref 50–136)
ALT SERPL-CCNC: 14 U/L (ref 12–65)
ANION GAP SERPL CALC-SCNC: 6 MMOL/L (ref 7–16)
AST SERPL-CCNC: 18 U/L (ref 15–37)
BASOPHILS # BLD: 0 K/UL (ref 0–0.2)
BASOPHILS NFR BLD: 1 % (ref 0–2)
BILIRUB SERPL-MCNC: 0.4 MG/DL (ref 0.2–1.1)
BUN SERPL-MCNC: 9 MG/DL (ref 8–23)
CALCIUM SERPL-MCNC: 9.6 MG/DL (ref 8.3–10.4)
CHLORIDE SERPL-SCNC: 110 MMOL/L (ref 98–107)
CHOLEST SERPL-MCNC: 140 MG/DL
CO2 SERPL-SCNC: 27 MMOL/L (ref 21–32)
CREAT SERPL-MCNC: 0.8 MG/DL (ref 0.6–1)
DIFFERENTIAL METHOD BLD: ABNORMAL
EOSINOPHIL # BLD: 0.1 K/UL (ref 0–0.8)
EOSINOPHIL NFR BLD: 2 % (ref 0.5–7.8)
ERYTHROCYTE [DISTWIDTH] IN BLOOD BY AUTOMATED COUNT: 14.2 % (ref 11.9–14.6)
GLOBULIN SER CALC-MCNC: 4 G/DL (ref 2.3–3.5)
GLUCOSE SERPL-MCNC: 80 MG/DL (ref 65–100)
HCT VFR BLD AUTO: 41.8 % (ref 35.8–46.3)
HDLC SERPL-MCNC: 55 MG/DL (ref 40–60)
HDLC SERPL: 2.5 {RATIO}
HGB BLD-MCNC: 12.6 G/DL (ref 11.7–15.4)
IMM GRANULOCYTES # BLD AUTO: 0 K/UL (ref 0–0.5)
IMM GRANULOCYTES NFR BLD AUTO: 0 % (ref 0–5)
LDLC SERPL CALC-MCNC: 71.2 MG/DL
LYMPHOCYTES # BLD: 1.3 K/UL (ref 0.5–4.6)
LYMPHOCYTES NFR BLD: 30 % (ref 13–44)
MCH RBC QN AUTO: 28.3 PG (ref 26.1–32.9)
MCHC RBC AUTO-ENTMCNC: 30.1 G/DL (ref 31.4–35)
MCV RBC AUTO: 93.7 FL (ref 79.6–97.8)
MONOCYTES # BLD: 0.3 K/UL (ref 0.1–1.3)
MONOCYTES NFR BLD: 7 % (ref 4–12)
NEUTS SEG # BLD: 2.6 K/UL (ref 1.7–8.2)
NEUTS SEG NFR BLD: 61 % (ref 43–78)
NRBC # BLD: 0 K/UL (ref 0–0.2)
PLATELET # BLD AUTO: 242 K/UL (ref 150–450)
PMV BLD AUTO: 10.3 FL (ref 9.4–12.3)
POTASSIUM SERPL-SCNC: 4.1 MMOL/L (ref 3.5–5.1)
PROT SERPL-MCNC: 7.9 G/DL (ref 6.3–8.2)
RBC # BLD AUTO: 4.46 M/UL (ref 4.05–5.2)
SODIUM SERPL-SCNC: 143 MMOL/L (ref 136–145)
TRIGL SERPL-MCNC: 69 MG/DL (ref 35–150)
TSH, 3RD GENERATION: 2.46 UIU/ML (ref 0.36–3.74)
VLDLC SERPL CALC-MCNC: 13.8 MG/DL (ref 6–23)
WBC # BLD AUTO: 4.3 K/UL (ref 4.3–11.1)

## 2022-07-06 ENCOUNTER — OFFICE VISIT (OUTPATIENT)
Dept: FAMILY MEDICINE CLINIC | Facility: CLINIC | Age: 77
End: 2022-07-06
Payer: MEDICARE

## 2022-07-06 VITALS
BODY MASS INDEX: 25.13 KG/M2 | OXYGEN SATURATION: 99 % | SYSTOLIC BLOOD PRESSURE: 132 MMHG | DIASTOLIC BLOOD PRESSURE: 78 MMHG | WEIGHT: 151 LBS | HEART RATE: 67 BPM

## 2022-07-06 DIAGNOSIS — D50.0 IRON DEFICIENCY ANEMIA DUE TO CHRONIC BLOOD LOSS: ICD-10-CM

## 2022-07-06 DIAGNOSIS — E55.9 VITAMIN D DEFICIENCY: ICD-10-CM

## 2022-07-06 DIAGNOSIS — R92.8 ABNORMAL MAMMOGRAM OF RIGHT BREAST: ICD-10-CM

## 2022-07-06 DIAGNOSIS — Z00.00 LABORATORY EXAMINATION ORDERED AS PART OF A COMPLETE PHYSICAL EXAMINATION: ICD-10-CM

## 2022-07-06 DIAGNOSIS — I11.0 BENIGN HYPERTENSIVE HEART DISEASE WITH CHF (CONGESTIVE HEART FAILURE) (HCC): ICD-10-CM

## 2022-07-06 DIAGNOSIS — E78.00 PURE HYPERCHOLESTEROLEMIA: Primary | ICD-10-CM

## 2022-07-06 PROCEDURE — 99214 OFFICE O/P EST MOD 30 MIN: CPT | Performed by: NURSE PRACTITIONER

## 2022-07-06 PROCEDURE — 1123F ACP DISCUSS/DSCN MKR DOCD: CPT | Performed by: NURSE PRACTITIONER

## 2022-07-06 RX ORDER — LISINOPRIL 40 MG/1
40 TABLET ORAL DAILY
Qty: 90 TABLET | Refills: 3 | Status: SHIPPED | OUTPATIENT
Start: 2022-07-06

## 2022-07-06 RX ORDER — AMLODIPINE BESYLATE 5 MG/1
TABLET ORAL
Qty: 90 TABLET | Refills: 3 | Status: SHIPPED | OUTPATIENT
Start: 2022-07-06

## 2022-07-06 RX ORDER — CARVEDILOL 25 MG/1
TABLET ORAL
Qty: 180 TABLET | Refills: 3 | Status: SHIPPED | OUTPATIENT
Start: 2022-07-06 | End: 2022-07-11

## 2022-07-06 NOTE — PROGRESS NOTES
PROGRESS NOTE    Chief Complaint   Patient presents with    Follow-up     presenting for follow up to discuss labs, HTN, HLD and vit D def. Currently taking Coreg 25 mg, Amlodipine 5 mg,  lisinopril 40 mg, Vit D 5,000 IU and crestor 20 mg.    Other     Due for CPX 12/29/2022       SUBJECTIVE:     Priscilla Mancera is a very pleasant 78 y.o. female with hx of  CHF, CAD s/p CABG-currently followed by cardiology, hypertension, and hyperlipidemia, seen today in office for follow up for lab results review. Patient reports doing very well overall and has no complaints or concerns today. Patient reports no chest pain, no shortness of breath, no unilateral or focal weakness, no orthopnea or PND. GI and  review of systems is unremarkable. Of note, patient recently had a mammogram completed that showed right breast calcification and questionable area requiring additional diagnostic mammogram images with possible ultrasound. She reports that she has a history of abnormal spots to right breast that has been told negative after further work-up. She reports that she does not wish to have any biopsy completed and upon further questioning, she has not scheduled for follow-up for additional images work-up. Past Medical History, Past Surgical History, Family history, Social History, and Medications were all reviewed with the patient today and updated as necessary.        Current Outpatient Medications   Medication Sig Dispense Refill    carvedilol (COREG) 25 MG tablet TAKE 1 TABLET BY MOUTH TWICE DAILY WITH MEALS 180 tablet 3    lisinopril (PRINIVIL;ZESTRIL) 40 MG tablet Take 1 tablet by mouth daily 90 tablet 3    amLODIPine (NORVASC) 5 MG tablet TAKE 1 TABLET BY MOUTH DAILY 90 tablet 3    aspirin 81 MG EC tablet Take 81 mg by mouth daily      vitamin D3 (CHOLECALCIFEROL) 125 MCG (5000 UT) TABS tablet Take 5,000 Units by mouth daily      ferrous sulfate (FE TABS 325) 325 (65 Fe) MG EC tablet Take 325 mg by mouth 2 times daily      rosuvastatin (CRESTOR) 20 MG tablet TAKE 1 TABLET BY MOUTH DAILY       No current facility-administered medications for this visit. Allergies   Allergen Reactions    Influenza Virus Vaccine Other (See Comments)     Patient Active Problem List   Diagnosis    S/P CABG (coronary artery bypass graft)    Carotid artery stenosis without cerebral infarction    Influenza A (H5N1)    Hypoxia    [facility]    S/P mitral valve repair    Iron deficiency anemia due to chronic blood loss    Panlobular emphysema ([facility]    AICD (automatic cardioverter/defibrillator) present    Pure hypercholesterolemia    Coronary atherosclerosis of native coronary artery    Benign hypertensive heart disease with CHF (congestive heart failure) ([facility]     Past Medical History:   Diagnosis Date    Acute mitral regurgitation     AICD (automatic cardioverter/defibrillator) present 12/23/2015    Anterior myocardial infarction [facility])     Arrhythmia     Benign hypertensive heart disease with CHF (congestive heart failure) ([facility] 12/23/2015    CAD (coronary artery disease)     [id number]; PC 1999I; Ischemic CM EF 25%    Carotid artery stenosis without cerebral infarction 12/23/2015    Chronic ischemic heart disease 12/23/2015    Chronic systolic HF (heart failure) ([facility] 3/17/2016    0/2009 - EF 30-35%.  02/2010 - Single leadICD - [employer] 02/2012:  EF 45-50% 03/2013:  EF 40-45% 09/2013:  EF 45% 09/2014:  EF 45% 09/2015:  EF 40-45%    Coronary atherosclerosis of native coronary artery     Dyspnea on exertion     GERD (gastroesophageal reflux disease)     GI bleed 6/30/2009    Hyperlipidemia     Hypertension     Menopause     52's    OA (osteoarthritis)     PUD (peptic ulcer disease)     hx ulcer  dx 6/2009    S/P mitral valve repair 3/17/2016    Tobacco use disorder      Past Surgical History:   Procedure Laterality Date   Paras Miranda    PCI  x 1 - pt does not have stent card     SECTION      x2    CORONARY ARTERY BYPASS GRAFT      MITRALPLASTY W CP BYPASS      with repair device    PACEMAKER      TUBAL LIGATION       Family History   Problem Relation Age of Onset    Heart Surgery Brother     Heart Disease Sister     Heart Disease Father     Heart Attack Father     Breast Cancer Neg Hx     Coronary Art Dis Other     No Known Problems Mother     Heart Disease Brother      Social History     Tobacco Use    Smoking status: Former Smoker     Packs/day: 0.25     Start date: 1975     Quit date: 2009     Years since quittin.0    Smokeless tobacco: Never Used    Tobacco comment: Quit smokin09   Substance Use Topics    Alcohol use: No         REVIEW OF SYSTEM    Review of Systems   Constitutional: Negative. Negative for activity change, appetite change, chills, diaphoresis, fatigue, fever and unexpected weight change. HENT: Negative. Negative for congestion, dental problem, drooling, ear discharge, ear pain, facial swelling, hearing loss, mouth sores, nosebleeds, postnasal drip, rhinorrhea, sinus pressure, sinus pain, sneezing, sore throat, tinnitus, trouble swallowing and voice change. Eyes: Negative. Negative for photophobia, pain, discharge, redness, itching and visual disturbance. Respiratory: Negative. Negative for apnea, cough, choking, chest tightness, shortness of breath, wheezing and stridor. Cardiovascular: Negative. Negative for chest pain, palpitations and leg swelling. Gastrointestinal: Negative. Negative for abdominal distention, abdominal pain, anal bleeding, blood in stool, constipation, diarrhea, nausea, rectal pain and vomiting. Endocrine: Negative. Negative for cold intolerance, heat intolerance, polydipsia, polyphagia and polyuria. Genitourinary: Negative.   Negative for decreased urine volume, difficulty urinating, dysuria, enuresis, flank pain, frequency, genital sores, hematuria and urgency. Musculoskeletal: Negative. Negative for arthralgias, back pain, gait problem, joint swelling, myalgias, neck pain and neck stiffness. Skin: Negative. Negative for color change, pallor, rash and wound. Allergic/Immunologic: Negative. Negative for environmental allergies, food allergies and immunocompromised state. Neurological: Negative. Negative for dizziness, tremors, seizures, syncope, facial asymmetry, speech difficulty, weakness, light-headedness, numbness and headaches. Hematological: Negative. Negative for adenopathy. Does not bruise/bleed easily. Psychiatric/Behavioral: Negative. Negative for agitation, behavioral problems, confusion, decreased concentration, dysphoric mood, hallucinations, self-injury, sleep disturbance and suicidal ideas. The patient is not nervous/anxious and is not hyperactive. OBJECTIVE:  /78 (Site: Left Upper Arm, Position: Sitting)   Pulse 67   Wt 151 lb (68.5 kg)   SpO2 99%   BMI 25.13 kg/m²      Physical Exam  Constitutional:       General: She is not in acute distress. Appearance: Normal appearance. She is not ill-appearing, toxic-appearing or diaphoretic. HENT:      Head: Normocephalic and atraumatic. Right Ear: Tympanic membrane, ear canal and external ear normal. There is no impacted cerumen. Left Ear: Tympanic membrane, ear canal and external ear normal. There is no impacted cerumen. Nose: Nose normal.      Mouth/Throat:      Mouth: Mucous membranes are moist.      Pharynx: Oropharynx is clear. Eyes:      Extraocular Movements: Extraocular movements intact. Conjunctiva/sclera: Conjunctivae normal.      Pupils: Pupils are equal, round, and reactive to light. Neck:      Vascular: No carotid bruit. Cardiovascular:      Rate and Rhythm: Normal rate and regular rhythm. Pulses: Normal pulses. Heart sounds: Normal heart sounds.    Pulmonary:      Effort: Pulmonary effort is normal. No respiratory distress. Breath sounds: Normal breath sounds. No stridor. No wheezing, rhonchi or rales. Chest:      Chest wall: No tenderness. Abdominal:      General: Abdomen is flat. Bowel sounds are normal. There is no distension. Palpations: Abdomen is soft. There is no shifting dullness, fluid wave, hepatomegaly, splenomegaly, mass or pulsatile mass. Tenderness: There is no abdominal tenderness. There is no right CVA tenderness, left CVA tenderness, guarding or rebound. Negative signs include Dumont's sign, Rovsing's sign, McBurney's sign, psoas sign and obturator sign. Hernia: No hernia is present. Musculoskeletal:         General: Normal range of motion. Cervical back: Normal range of motion and neck supple. No rigidity or tenderness. Lymphadenopathy:      Cervical: No cervical adenopathy. Skin:     General: Skin is warm and dry. Capillary Refill: Capillary refill takes less than 2 seconds. Neurological:      General: No focal deficit present. Mental Status: She is alert and oriented to person, place, and time. Psychiatric:         Mood and Affect: Mood normal.         Behavior: Behavior normal.         Thought Content: Thought content normal.         Judgment: Judgment normal.           Medical problems and test results were reviewed with the patient today.    Lab Results   Component Value Date     06/28/2022    K 4.1 06/28/2022     (H) 06/28/2022    CO2 27 06/28/2022    BUN 9 06/28/2022    CREATININE 0.80 06/28/2022    GLUCOSE 80 06/28/2022    CALCIUM 9.6 06/28/2022    PROT 7.9 06/28/2022    LABALBU 3.9 06/28/2022    BILITOT 0.4 06/28/2022    ALKPHOS 58 06/28/2022    AST 18 06/28/2022    ALT 14 06/28/2022    LABGLOM >60 06/28/2022    GFRAA >60 06/28/2022    AGRATIO 1.5 12/20/2021    GLOB 4.0 (H) 06/28/2022       Lab Results   Component Value Date    WBC 4.3 06/28/2022    HGB 12.6 06/28/2022    HCT 41.8 06/28/2022    MCV 93.7 06/28/2022     06/28/2022 Lab Results   Component Value Date    CHOL 140 06/28/2022    CHOL 134 12/20/2021    CHOL 141 06/10/2021     Lab Results   Component Value Date    TRIG 69 06/28/2022    TRIG 56 12/20/2021    TRIG 57 06/10/2021     Lab Results   Component Value Date    HDL 55 06/28/2022    HDL 50 12/20/2021    HDL 52 06/10/2021     Lab Results   Component Value Date    LDLCALC 71.2 06/28/2022    LDLCALC 72 12/20/2021    LDLCALC 77 06/10/2021     Lab Results   Component Value Date    LABVLDL 13.8 06/28/2022    LABVLDL 15 03/09/2020    VLDL 12 12/20/2021    VLDL 12 06/10/2021    VLDL 13 09/22/2020     Lab Results   Component Value Date    CHOLHDLRATIO 2.5 06/28/2022     Lab Results   Component Value Date/Time    VITD25 42.9 06/28/2022 10:00 AM      Lab Results   Component Value Date    TSH 4.430 12/20/2021         ASSESSMENT and PLAN    1. Pure hypercholesterolemia  -     Comprehensive Metabolic Panel; Future  -     Lipid Panel; Future    Lipid panel stable. Continue current therapy. We will recheck labs in 6 months. 2. Iron deficiency anemia due to chronic blood loss  -     CBC with Auto Differential; Future  -     Comprehensive Metabolic Panel; Future    CBC is stable with hemoglobin of 12.6 and hematocrit of 41.8. Continue current therapy. We will recheck labs in 6 months. 3. Abnormal mammogram of right breast   Patient with right calcification for which further evaluation is recommended with ML and magnification view. Per radiology, they will be attempting to contact patient to arrange for further imaging. Patient reports that she has not scheduled for imaging yet due to refusal for biopsy completion. Discussed with patient that further images are needed to clarify and further evaluate calcifications to right breast.  There is no recommendation for biopsy at this time.   Advised patient that if that was recommendation after further images completion, a radiologist or provider will discuss with patient and obtain symptoms, and follow up plans. Return in about 6 months (around 12/29/2022) for AWV with fasting labs prior.      Sebastian Armendariz, APRN - CNP

## 2022-07-07 ASSESSMENT — ENCOUNTER SYMPTOMS
TROUBLE SWALLOWING: 0
SINUS PRESSURE: 0
EYE DISCHARGE: 0
EYE ITCHING: 0
FACIAL SWELLING: 0
ANAL BLEEDING: 0
EYE PAIN: 0
NAUSEA: 0
SINUS PAIN: 0
ALLERGIC/IMMUNOLOGIC NEGATIVE: 1
SORE THROAT: 0
CONSTIPATION: 0
RHINORRHEA: 0
PHOTOPHOBIA: 0
RESPIRATORY NEGATIVE: 1
CHEST TIGHTNESS: 0
APNEA: 0
ABDOMINAL DISTENTION: 0
SHORTNESS OF BREATH: 0
EYE REDNESS: 0
GASTROINTESTINAL NEGATIVE: 1
EYES NEGATIVE: 1
BACK PAIN: 0
VOICE CHANGE: 0
BLOOD IN STOOL: 0
WHEEZING: 0
ABDOMINAL PAIN: 0
COLOR CHANGE: 0
CHOKING: 0
VOMITING: 0
DIARRHEA: 0
COUGH: 0
RECTAL PAIN: 0
STRIDOR: 0

## 2022-07-11 RX ORDER — CARVEDILOL 25 MG/1
TABLET ORAL
Qty: 180 TABLET | Refills: 3 | Status: SHIPPED | OUTPATIENT
Start: 2022-07-11

## 2022-07-20 ENCOUNTER — HOSPITAL ENCOUNTER (OUTPATIENT)
Dept: MAMMOGRAPHY | Age: 77
Discharge: HOME | End: 2022-07-23
Payer: MEDICARE

## 2022-07-20 DIAGNOSIS — R92.8 ABNORMAL SCREENING MAMMOGRAM: ICD-10-CM

## 2022-07-20 DIAGNOSIS — R92.8 ABNORMAL MAMMOGRAM OF RIGHT BREAST: Primary | ICD-10-CM

## 2022-07-20 PROCEDURE — 77065 DX MAMMO INCL CAD UNI: CPT

## 2022-09-06 PROCEDURE — 93295 DEV INTERROG REMOTE 1/2/MLT: CPT | Performed by: INTERNAL MEDICINE

## 2022-09-06 PROCEDURE — 93296 REM INTERROG EVL PM/IDS: CPT | Performed by: INTERNAL MEDICINE

## 2022-09-23 NOTE — PROGRESS NOTES
BERHANE recevied from [facility], [address]. Patient remains in stable condition with respirations even/unlabored. No acute distress noted at this time. Patient laying in bed watching television. Call light remains within reach, patient encouraged to call nurse prn assist. Will continue to monitor per policy. General

## 2022-10-05 DIAGNOSIS — Z95.810 PRESENCE OF AUTOMATIC (IMPLANTABLE) CARDIAC DEFIBRILLATOR: ICD-10-CM

## 2022-10-05 DIAGNOSIS — Z95.810 PRESENCE OF AUTOMATIC (IMPLANTABLE) CARDIAC DEFIBRILLATOR: Primary | ICD-10-CM

## 2022-10-25 ENCOUNTER — HOSPITAL ENCOUNTER (INPATIENT)
Age: 77
LOS: 1 days | Discharge: HOME | DRG: 377 | End: 2022-10-28
Attending: EMERGENCY MEDICINE | Admitting: INTERNAL MEDICINE
Payer: MEDICARE

## 2022-10-25 ENCOUNTER — APPOINTMENT (OUTPATIENT)
Dept: GENERAL RADIOLOGY | Age: 77
DRG: 377 | End: 2022-10-25
Payer: MEDICARE

## 2022-10-25 ENCOUNTER — APPOINTMENT (OUTPATIENT)
Dept: CT IMAGING | Age: 77
DRG: 377 | End: 2022-10-25
Payer: MEDICARE

## 2022-10-25 DIAGNOSIS — K92.1 MELENA: ICD-10-CM

## 2022-10-25 DIAGNOSIS — R55 SYNCOPE AND COLLAPSE: Primary | ICD-10-CM

## 2022-10-25 PROBLEM — J43.1 PANLOBULAR EMPHYSEMA (HCC): Status: ACTIVE | Noted: 2019-02-13

## 2022-10-25 PROBLEM — D50.0 IRON DEFICIENCY ANEMIA DUE TO CHRONIC BLOOD LOSS: Status: ACTIVE | Noted: 2019-10-14

## 2022-10-25 PROBLEM — J09.X2 INFLUENZA A (H5N1): Status: RESOLVED | Noted: 2019-02-01 | Resolved: 2022-10-25

## 2022-10-25 PROBLEM — U07.1 COVID-19 WITH MULTIPLE COMORBIDITIES: Status: ACTIVE | Noted: 2022-10-25

## 2022-10-25 LAB
ALBUMIN SERPL-MCNC: 3.7 G/DL (ref 3.2–4.6)
ALBUMIN/GLOB SERPL: 0.8 {RATIO} (ref 0.4–1.6)
ALP SERPL-CCNC: 64 U/L (ref 50–136)
ALT SERPL-CCNC: 16 U/L (ref 12–65)
ANION GAP SERPL CALC-SCNC: 8 MMOL/L (ref 2–11)
AST SERPL-CCNC: 18 U/L (ref 15–37)
BASOPHILS # BLD: 0 K/UL (ref 0–0.2)
BASOPHILS NFR BLD: 0 % (ref 0–2)
BILIRUB SERPL-MCNC: 0.5 MG/DL (ref 0.2–1.1)
BUN SERPL-MCNC: 16 MG/DL (ref 8–23)
CALCIUM SERPL-MCNC: 9.5 MG/DL (ref 8.3–10.4)
CHLORIDE SERPL-SCNC: 109 MMOL/L (ref 101–110)
CO2 SERPL-SCNC: 24 MMOL/L (ref 21–32)
CREAT SERPL-MCNC: 0.9 MG/DL (ref 0.6–1)
CRP SERPL-MCNC: 1.3 MG/DL (ref 0–0.9)
D DIMER PPP FEU-MCNC: 0.79 UG/ML(FEU)
DIFFERENTIAL METHOD BLD: ABNORMAL
EOSINOPHIL # BLD: 0 K/UL (ref 0–0.8)
EOSINOPHIL NFR BLD: 0 % (ref 0.5–7.8)
ERYTHROCYTE [DISTWIDTH] IN BLOOD BY AUTOMATED COUNT: 15.1 % (ref 11.9–14.6)
FERRITIN SERPL-MCNC: 9 NG/ML (ref 8–388)
FLUAV AG NPH QL IA: NEGATIVE
FLUBV AG NPH QL IA: NEGATIVE
FOLATE SERPL-MCNC: 32.7 NG/ML (ref 3.1–17.5)
GLOBULIN SER CALC-MCNC: 4.6 G/DL (ref 2.8–4.5)
GLUCOSE SERPL-MCNC: 109 MG/DL (ref 65–100)
HCT VFR BLD AUTO: 31.4 % (ref 35.8–46.3)
HGB BLD-MCNC: 9.3 G/DL (ref 11.7–15.4)
IMM GRANULOCYTES # BLD AUTO: 0.1 K/UL (ref 0–0.5)
IMM GRANULOCYTES NFR BLD AUTO: 1 % (ref 0–5)
IRON SATN MFR SERPL: 7 %
IRON SERPL-MCNC: 20 UG/DL (ref 35–150)
LYMPHOCYTES # BLD: 1.2 K/UL (ref 0.5–4.6)
LYMPHOCYTES NFR BLD: 11 % (ref 13–44)
MCH RBC QN AUTO: 25.7 PG (ref 26.1–32.9)
MCHC RBC AUTO-ENTMCNC: 29.6 G/DL (ref 31.4–35)
MCV RBC AUTO: 86.7 FL (ref 82–102)
MONOCYTES # BLD: 0.8 K/UL (ref 0.1–1.3)
MONOCYTES NFR BLD: 8 % (ref 4–12)
NEUTS SEG # BLD: 8.4 K/UL (ref 1.7–8.2)
NEUTS SEG NFR BLD: 80 % (ref 43–78)
NRBC # BLD: 0 K/UL (ref 0–0.2)
NT PRO BNP: 2497 PG/ML
PLATELET # BLD AUTO: 257 K/UL (ref 150–450)
PMV BLD AUTO: 9.9 FL (ref 9.4–12.3)
POTASSIUM SERPL-SCNC: 3.5 MMOL/L (ref 3.5–5.1)
PROT SERPL-MCNC: 8.3 G/DL (ref 6.3–8.2)
RBC # BLD AUTO: 3.62 M/UL (ref 4.05–5.2)
SARS-COV-2 RDRP RESP QL NAA+PROBE: DETECTED
SODIUM SERPL-SCNC: 141 MMOL/L (ref 133–143)
SOURCE: ABNORMAL
SPECIMEN SOURCE: NORMAL
TIBC SERPL-MCNC: 301 UG/DL (ref 250–450)
TROPONIN I SERPL HS-MCNC: 13.7 PG/ML (ref 0–14)
TROPONIN I SERPL HS-MCNC: 15.5 PG/ML (ref 0–14)
VIT B12 SERPL-MCNC: 677 PG/ML (ref 193–986)
WBC # BLD AUTO: 10.5 K/UL (ref 4.3–11.1)

## 2022-10-25 PROCEDURE — 84484 ASSAY OF TROPONIN QUANT: CPT

## 2022-10-25 PROCEDURE — 86140 C-REACTIVE PROTEIN: CPT

## 2022-10-25 PROCEDURE — 82746 ASSAY OF FOLIC ACID SERUM: CPT

## 2022-10-25 PROCEDURE — G0378 HOSPITAL OBSERVATION PER HR: HCPCS

## 2022-10-25 PROCEDURE — 85025 COMPLETE CBC W/AUTO DIFF WBC: CPT

## 2022-10-25 PROCEDURE — 93005 ELECTROCARDIOGRAM TRACING: CPT | Performed by: EMERGENCY MEDICINE

## 2022-10-25 PROCEDURE — 87635 SARS-COV-2 COVID-19 AMP PRB: CPT

## 2022-10-25 PROCEDURE — 86850 RBC ANTIBODY SCREEN: CPT

## 2022-10-25 PROCEDURE — 96374 THER/PROPH/DIAG INJ IV PUSH: CPT

## 2022-10-25 PROCEDURE — 70450 CT HEAD/BRAIN W/O DYE: CPT

## 2022-10-25 PROCEDURE — 80053 COMPREHEN METABOLIC PANEL: CPT

## 2022-10-25 PROCEDURE — 82607 VITAMIN B-12: CPT

## 2022-10-25 PROCEDURE — 2500000003 HC RX 250 WO HCPCS: Performed by: FAMILY MEDICINE

## 2022-10-25 PROCEDURE — 96375 TX/PRO/DX INJ NEW DRUG ADDON: CPT

## 2022-10-25 PROCEDURE — 2580000003 HC RX 258: Performed by: STUDENT IN AN ORGANIZED HEALTH CARE EDUCATION/TRAINING PROGRAM

## 2022-10-25 PROCEDURE — 83550 IRON BINDING TEST: CPT

## 2022-10-25 PROCEDURE — 6370000000 HC RX 637 (ALT 250 FOR IP): Performed by: FAMILY MEDICINE

## 2022-10-25 PROCEDURE — 36415 COLL VENOUS BLD VENIPUNCTURE: CPT

## 2022-10-25 PROCEDURE — 99285 EMERGENCY DEPT VISIT HI MDM: CPT

## 2022-10-25 PROCEDURE — 85379 FIBRIN DEGRADATION QUANT: CPT

## 2022-10-25 PROCEDURE — C9113 INJ PANTOPRAZOLE SODIUM, VIA: HCPCS | Performed by: STUDENT IN AN ORGANIZED HEALTH CARE EDUCATION/TRAINING PROGRAM

## 2022-10-25 PROCEDURE — 2500000003 HC RX 250 WO HCPCS: Performed by: STUDENT IN AN ORGANIZED HEALTH CARE EDUCATION/TRAINING PROGRAM

## 2022-10-25 PROCEDURE — 85014 HEMATOCRIT: CPT

## 2022-10-25 PROCEDURE — 82728 ASSAY OF FERRITIN: CPT

## 2022-10-25 PROCEDURE — 83880 ASSAY OF NATRIURETIC PEPTIDE: CPT

## 2022-10-25 PROCEDURE — 87804 INFLUENZA ASSAY W/OPTIC: CPT

## 2022-10-25 PROCEDURE — 71045 X-RAY EXAM CHEST 1 VIEW: CPT

## 2022-10-25 PROCEDURE — A4216 STERILE WATER/SALINE, 10 ML: HCPCS | Performed by: STUDENT IN AN ORGANIZED HEALTH CARE EDUCATION/TRAINING PROGRAM

## 2022-10-25 PROCEDURE — 6360000002 HC RX W HCPCS: Performed by: STUDENT IN AN ORGANIZED HEALTH CARE EDUCATION/TRAINING PROGRAM

## 2022-10-25 PROCEDURE — 2580000003 HC RX 258: Performed by: FAMILY MEDICINE

## 2022-10-25 RX ORDER — MAGNESIUM HYDROXIDE/ALUMINUM HYDROXICE/SIMETHICONE 120; 1200; 1200 MG/30ML; MG/30ML; MG/30ML
30 SUSPENSION ORAL EVERY 6 HOURS PRN
Status: DISCONTINUED | OUTPATIENT
Start: 2022-10-25 | End: 2022-10-28 | Stop reason: HOSPADM

## 2022-10-25 RX ORDER — ONDANSETRON 4 MG/1
4 TABLET, ORALLY DISINTEGRATING ORAL EVERY 8 HOURS PRN
Status: DISCONTINUED | OUTPATIENT
Start: 2022-10-25 | End: 2022-10-28 | Stop reason: HOSPADM

## 2022-10-25 RX ORDER — SODIUM CHLORIDE, SODIUM LACTATE, POTASSIUM CHLORIDE, AND CALCIUM CHLORIDE .6; .31; .03; .02 G/100ML; G/100ML; G/100ML; G/100ML
500 INJECTION, SOLUTION INTRAVENOUS ONCE
Status: COMPLETED | OUTPATIENT
Start: 2022-10-25 | End: 2022-10-26

## 2022-10-25 RX ORDER — SODIUM CHLORIDE 0.9 % (FLUSH) 0.9 %
5-40 SYRINGE (ML) INJECTION PRN
Status: DISCONTINUED | OUTPATIENT
Start: 2022-10-25 | End: 2022-10-28 | Stop reason: HOSPADM

## 2022-10-25 RX ORDER — ACETAMINOPHEN 325 MG/1
650 TABLET ORAL EVERY 6 HOURS PRN
Status: DISCONTINUED | OUTPATIENT
Start: 2022-10-25 | End: 2022-10-28 | Stop reason: HOSPADM

## 2022-10-25 RX ORDER — DEXAMETHASONE SODIUM PHOSPHATE 10 MG/ML
10 INJECTION INTRAMUSCULAR; INTRAVENOUS ONCE
Status: COMPLETED | OUTPATIENT
Start: 2022-10-25 | End: 2022-10-25

## 2022-10-25 RX ORDER — ACETAMINOPHEN 650 MG/1
650 SUPPOSITORY RECTAL EVERY 6 HOURS PRN
Status: DISCONTINUED | OUTPATIENT
Start: 2022-10-25 | End: 2022-10-28 | Stop reason: HOSPADM

## 2022-10-25 RX ORDER — POTASSIUM CHLORIDE 20 MEQ/1
40 TABLET, EXTENDED RELEASE ORAL ONCE
Status: COMPLETED | OUTPATIENT
Start: 2022-10-25 | End: 2022-10-25

## 2022-10-25 RX ORDER — CARVEDILOL 12.5 MG/1
12.5 TABLET ORAL 2 TIMES DAILY WITH MEALS
Status: DISCONTINUED | OUTPATIENT
Start: 2022-10-26 | End: 2022-10-28 | Stop reason: HOSPADM

## 2022-10-25 RX ORDER — SODIUM CHLORIDE 0.9 % (FLUSH) 0.9 %
5-40 SYRINGE (ML) INJECTION EVERY 12 HOURS SCHEDULED
Status: DISCONTINUED | OUTPATIENT
Start: 2022-10-25 | End: 2022-10-28 | Stop reason: HOSPADM

## 2022-10-25 RX ORDER — POLYETHYLENE GLYCOL 3350 17 G/17G
17 POWDER, FOR SOLUTION ORAL DAILY PRN
Status: DISCONTINUED | OUTPATIENT
Start: 2022-10-25 | End: 2022-10-28 | Stop reason: HOSPADM

## 2022-10-25 RX ORDER — GUAIFENESIN/DEXTROMETHORPHAN 100-10MG/5
5 SYRUP ORAL EVERY 4 HOURS PRN
Status: DISCONTINUED | OUTPATIENT
Start: 2022-10-25 | End: 2022-10-28 | Stop reason: HOSPADM

## 2022-10-25 RX ORDER — ROSUVASTATIN CALCIUM 20 MG/1
20 TABLET, COATED ORAL
Status: DISCONTINUED | OUTPATIENT
Start: 2022-10-26 | End: 2022-10-28 | Stop reason: HOSPADM

## 2022-10-25 RX ORDER — ONDANSETRON 2 MG/ML
4 INJECTION INTRAMUSCULAR; INTRAVENOUS EVERY 6 HOURS PRN
Status: DISCONTINUED | OUTPATIENT
Start: 2022-10-25 | End: 2022-10-28 | Stop reason: HOSPADM

## 2022-10-25 RX ORDER — SODIUM CHLORIDE 9 MG/ML
INJECTION, SOLUTION INTRAVENOUS PRN
Status: DISCONTINUED | OUTPATIENT
Start: 2022-10-25 | End: 2022-10-28 | Stop reason: HOSPADM

## 2022-10-25 RX ADMIN — TUBERCULIN PURIFIED PROTEIN DERIVATIVE 5 UNITS: 5 INJECTION, SOLUTION INTRADERMAL at 23:07

## 2022-10-25 RX ADMIN — FAMOTIDINE 20 MG: 10 INJECTION, SOLUTION INTRAVENOUS at 19:10

## 2022-10-25 RX ADMIN — SODIUM CHLORIDE, PRESERVATIVE FREE 10 ML: 5 INJECTION INTRAVENOUS at 23:06

## 2022-10-25 RX ADMIN — SODIUM CHLORIDE 40 MG: 9 INJECTION, SOLUTION INTRAMUSCULAR; INTRAVENOUS; SUBCUTANEOUS at 19:10

## 2022-10-25 RX ADMIN — SODIUM CHLORIDE, POTASSIUM CHLORIDE, SODIUM LACTATE AND CALCIUM CHLORIDE 500 ML: 600; 310; 30; 20 INJECTION, SOLUTION INTRAVENOUS at 23:05

## 2022-10-25 RX ADMIN — DEXAMETHASONE SODIUM PHOSPHATE 10 MG: 10 INJECTION INTRAMUSCULAR; INTRAVENOUS at 16:56

## 2022-10-25 RX ADMIN — POTASSIUM CHLORIDE 40 MEQ: 1500 TABLET, EXTENDED RELEASE ORAL at 22:59

## 2022-10-25 ASSESSMENT — ENCOUNTER SYMPTOMS
BLOOD IN STOOL: 0
SINUS PRESSURE: 1
NAUSEA: 0
CONSTIPATION: 0
VOICE CHANGE: 0
DIARRHEA: 0
CHEST TIGHTNESS: 0
WHEEZING: 0
EYE DISCHARGE: 0
COUGH: 1
EYE REDNESS: 0
SHORTNESS OF BREATH: 0
EYE PAIN: 0
APNEA: 0
PHOTOPHOBIA: 0
ABDOMINAL PAIN: 0
VOMITING: 1
RHINORRHEA: 0
SORE THROAT: 1
COLOR CHANGE: 0

## 2022-10-25 ASSESSMENT — PAIN - FUNCTIONAL ASSESSMENT: PAIN_FUNCTIONAL_ASSESSMENT: NONE - DENIES PAIN

## 2022-10-25 NOTE — ED PROVIDER NOTES
Emergency Department Provider Note                   PCP:                TIANNA Banuelos CNP               Age: 78 y.o. Sex: female       ICD-10-CM    1. Syncope and collapse  R55       2. Melena  K92.1           DISPOSITION Decision To Admit 10/25/2022 06:15:06 PM      ===================================  ED EKG Interpretation  EKG was interpreted in the absence of a cardiologist.    Rate: 79  EKG Interpretation: EKG Interpretation: sinus rhythm and right bundle branch block  ST Segments: Normal ST segments - NO STEMI  AR prolonged. QRS prolonged. Qtc 460. Normal when compared with past EKG from one year ago. YAZ Ley 6:22 PM     MDM  Number of Diagnoses or Management Options  Melena  Syncope and collapse  Diagnosis management comments: 4:47 PM  Ddx includes anemia, cardiac arrhythmia, cardiac valve abnormality, hypoglycemia, AAA rupture, aortic dissection, PE, SAH, orthostatic, seizure, vasovagal.  Less likely AAA or dissection given no pain and benign exam.  Low suspicion for PE however we will get a D-dimer. Low suspicion for seizure given return to baseline immediately after awakening. EKG within normal limits. Blood glucose normal.  No pallor or signs of symptomatic anemia. Suspecting this was most likely due to the patient not eating lately and being slightly dehydrated as well as having a concurrent viral syndrome. Chest x-ray unremarkable. Will evaluate with CT head, lab work, orthostatic vitals and reassess. [facility] syncope rule positive due to CHF. Will likely admit for observation if work-up is unremarkable. 6:20 PM  Work-up remarkable for positive orthostatics, positive COVID, hemoglobin of 9.3 which is down from 12.6 3 months ago. Guaiac result positive. Ct head normal. CXR unremarkable. Suspecting GI bleed. Consulted Dr. Garcia Eye hospitalist to admit patient for suspected GI bleed who agreed to accept patient.   Also consulted gastroenterology Dr. Zain Baxter. Discussed finding with patient. Counseled her on why we need to admit her and further work-up for suspected GI bleed. She agreed with the plan and verbalized understanding. Amount and/or Complexity of Data Reviewed  Clinical lab tests: ordered and reviewed  Tests in the radiology section of CPT®: ordered and reviewed  Discuss the patient with other providers: yes  Independent visualization of images, tracings, or specimens: yes    Risk of Complications, Morbidity, and/or Mortality  Presenting problems: moderate  Diagnostic procedures: moderate  Management options: moderate    Patient Progress  Patient progress: stable       ED Course as of 10/25/22 1822   Tue Oct 25, 2022   1721 5:21 PM  Orthostatic positive. [NR]   4584 5:55 PM  Covid positive. Acute drop in hemoglobin.  [NR]      ED Course User Index  [NR] YAZ Vazquez        Orders Placed This Encounter   Procedures    COVID-19, Rapid    Rapid influenza A/B antigens    XR CHEST PORTABLE    CT HEAD WO CONTRAST    CBC with Auto Differential    Comprehensive Metabolic Panel    D-Dimer, Quantitative    Brain Natriuretic Peptide    Troponin    Troponin    Cardiac Monitor - ED Only    Continuous Pulse Oximetry    Orthostatic blood pressure and pulse    Orthostatic Vital Signs    EKG 12 Lead        Medications   famotidine (PEPCID) 20 mg in sodium chloride (PF) 10 mL injection (has no administration in time range)   pantoprazole (PROTONIX) 40 mg in sodium chloride (PF) 10 mL injection (has no administration in time range)   dexamethasone (DECADRON) injection 10 mg (10 mg IntraVENous Given 10/25/22 1656)       New Prescriptions    No medications on file        Estelle Dominguez is a 78 y.o. female who presents to the Emergency Department with chief complaint of    Chief Complaint   Patient presents with    Loss of Consciousness      71-year-old female patient with history of CHF, hypertension presents today after syncopal episode that occurred about 2 hours ago. Reports she was sitting in her chair outside with her sister when she suddenly felt lightheaded and weak and her vision went dark and then she passed out. The sister reports the patient was out for a few seconds before waking up. The patient reports she was oriented immediately after waking up and was not confused. She reports they called the ambulance at that time. She does admit for the past few days she has had sinus congestion, body aches, dry cough and has not been feeling well overall. Admits to 1 episode of \"mucousy\" vomiting this morning. Reports decreased appetite and reports she has not eaten anything since lunch yesterday. Reports she thinks that this contributed to her syncope. The sister reports the patient did not hit her head. Patient denies headache, visual changes, emesis, ataxia, aphasia. Denies bladder or bowel incontinence. Denies biting her tongue. Denies chest pain, shortness of breath. Denies abdominal pain, pulsing masses, diarrhea. Patient is primary historian. History is supplemented by the son and the sister. Quality seems reliable. The history is provided by the patient and a relative. No  was used. Review of Systems   Constitutional:  Positive for appetite change. Negative for chills, fatigue, fever and unexpected weight change. HENT:  Positive for congestion, sinus pressure and sore throat. Negative for ear pain, hearing loss, postnasal drip, rhinorrhea and voice change. Eyes:  Negative for photophobia, pain, discharge, redness and visual disturbance. Respiratory:  Positive for cough. Negative for apnea, chest tightness, shortness of breath and wheezing. Cardiovascular:  Negative for chest pain, palpitations and leg swelling. Gastrointestinal:  Positive for vomiting. Negative for abdominal pain, blood in stool, constipation, diarrhea and nausea. Endocrine: Negative for polydipsia, polyphagia and polyuria. Genitourinary:  Negative for decreased urine volume, dysuria, flank pain, frequency and hematuria. Musculoskeletal:  Positive for myalgias. Negative for arthralgias, joint swelling and neck stiffness. Skin:  Negative for color change, rash and wound. Neurological:  Positive for syncope. Negative for dizziness, speech difficulty, weakness, light-headedness and headaches. Hematological:  Negative for adenopathy. Does not bruise/bleed easily. Psychiatric/Behavioral:  Negative for confusion, self-injury, sleep disturbance and suicidal ideas. All other systems reviewed and are negative. Past Medical History:   Diagnosis Date    Acute mitral regurgitation     AICD (automatic cardioverter/defibrillator) present 2015    Anterior myocardial infarction [facility])     Arrhythmia     Benign hypertensive heart disease with CHF (congestive heart failure) ([facility] 2015    CAD (coronary artery disease)     [id number]; PC ; Ischemic CM EF 25%    Carotid artery stenosis without cerebral infarction 2015    Chronic ischemic heart disease 2015    Chronic systolic HF (heart failure) ([facility] 3/17/2016     - EF 30-35%.  2010 - Single leadICD - [employer] 2012:  EF 45-50% 2013:  EF 40-45% 2013:  EF 45% 2014:  EF 45% 2015:  EF 40-45%    Coronary atherosclerosis of native coronary artery     Dyspnea on exertion     GERD (gastroesophageal reflux disease)     GI bleed 2009    Hyperlipidemia     Hypertension     Menopause     50's    OA (osteoarthritis)     PUD (peptic ulcer disease)     hx ulcer  dx 2009    S/P mitral valve repair 3/17/2016    Tobacco use disorder         Past Surgical History:   Procedure Laterality Date    One ModeWalk    PCI  x 1 - pt does not have stent card     SECTION      x2    CORONARY ARTERY BYPASS GRAFT      MITRALPLASTY W CP BYPASS      with repair device    PACEMAKER      TUBAL LIGATION          Family History   Problem Relation Age of Onset    Heart Surgery Brother     Heart Disease Sister     Heart Disease Father     Heart Attack Father     Breast Cancer Neg Hx     Coronary Art Dis Other     No Known Problems Mother     Heart Disease Brother         Social History     Socioeconomic History    Marital status: Single     Spouse name: None    Number of children: None    Years of education: None    Highest education level: None   Tobacco Use    Smoking status: Former     Packs/day: 0.25     Types: Cigarettes     Start date: 1975     Quit date: 2009     Years since quittin.3    Smokeless tobacco: Never    Tobacco comments:     Quit smokin09   Substance and Sexual Activity    Alcohol use: No    Drug use: Yes     Types: Prescription   Social History Narrative    She lives in [place]         Influenza virus vaccine     Previous Medications    AMLODIPINE (NORVASC) 5 MG TABLET    TAKE 1 TABLET BY MOUTH DAILY    ASPIRIN 81 MG EC TABLET    Take 81 mg by mouth daily    CARVEDILOL (COREG) 25 MG TABLET    TAKE 1 TABLET BY MOUTH TWICE DAILY WITH MEALS    FERROUS SULFATE (FE TABS 325) 325 (65 FE) MG EC TABLET    Take 325 mg by mouth 2 times daily    LISINOPRIL (PRINIVIL;ZESTRIL) 40 MG TABLET    Take 1 tablet by mouth daily    ROSUVASTATIN (CRESTOR) 20 MG TABLET    TAKE 1 TABLET BY MOUTH DAILY    VITAMIN D3 (CHOLECALCIFEROL) 125 MCG (5000 UT) TABS TABLET    Take 5,000 Units by mouth daily        Vitals signs and nursing note reviewed. Patient Vitals for the past 4 hrs:   Temp Pulse Resp BP SpO2   10/25/22 1715 -- 88 15 115/62 96 %   10/25/22 1700 -- 93 15 102/65 93 %   10/25/22 1658 -- 91 14 128/65 94 %   10/25/22 1656 -- 93 23 124/60 95 %   10/25/22 1552 99.9 °F (37.7 °C) 93 16 (!) 113/46 94 %          Physical Exam  Vitals and nursing note reviewed. Exam conducted with a chaperone present. Constitutional:       General: She is not in acute distress. Appearance: Normal appearance. She is normal weight.  She is not ill-appearing, toxic-appearing or diaphoretic. Comments: Pleasant and cooperative. Speaks in full sentences. No acute distress. Infrequent dry cough heard. Infrequent sneezing. Congestion heard on voice. HENT:      Head: Normocephalic and atraumatic. Comments: No scalp tenderness. No palpable deformities. Right Ear: Tympanic membrane, ear canal and external ear normal. There is no impacted cerumen. Left Ear: Tympanic membrane, ear canal and external ear normal. There is no impacted cerumen. Ears:      Comments: No mastoid tenderness or swelling bilaterally. Nose: Congestion present. No rhinorrhea. Comments: No frontal or maxillary sinus tenderness bilaterally. Mouth/Throat:      Mouth: Mucous membranes are dry. Pharynx: Posterior oropharyngeal erythema present. No oropharyngeal exudate. Comments: Uvula midline. No tonsillar swelling or exudate. Handling secretions well. No drooling. Mucous membranes appear slightly dry. No signs of tongue biting. Eyes:      General: No scleral icterus. Right eye: No discharge. Left eye: No discharge. Extraocular Movements: Extraocular movements intact. Conjunctiva/sclera: Conjunctivae normal.      Pupils: Pupils are equal, round, and reactive to light. Comments: No conjunctival pallor. Neck:      Comments: Full range of motion of neck without pain. Negative Kernig sign. Negative Brudzinski sign. Cardiovascular:      Rate and Rhythm: Normal rate and regular rhythm. Pulses: Normal pulses. Heart sounds: Normal heart sounds. No murmur heard. Pulmonary:      Effort: Pulmonary effort is normal. No respiratory distress. Breath sounds: Normal breath sounds. No stridor. No wheezing, rhonchi or rales. Abdominal:      General: Abdomen is flat. Bowel sounds are normal. There is no distension. Palpations: Abdomen is soft. There is no mass. Tenderness:  There is no abdominal tenderness. There is no right CVA tenderness, left CVA tenderness, guarding or rebound. Hernia: No hernia is present. Comments: No pulsating masses. Negative abdominal exam without peritoneal signs or tenderness. Genitourinary:     Rectum: Guaiac result positive. Comments: Ang Townsend RN present for exam.  2 small external hemorrhoids noted. No strangulation or incarceration. Internal exam with small stool ball palpated without impaction. Darkish brown stool noted. Positive guaiac. Musculoskeletal:         General: Normal range of motion. Cervical back: Normal range of motion and neck supple. No rigidity or tenderness. Right lower leg: No edema. Left lower leg: No edema. Lymphadenopathy:      Cervical: No cervical adenopathy. Skin:     General: Skin is warm and dry. Capillary Refill: Capillary refill takes less than 2 seconds. Coloration: Skin is not jaundiced or pale. Neurological:      General: No focal deficit present. Mental Status: She is alert and oriented to person, place, and time. Mental status is at baseline. Cranial Nerves: No cranial nerve deficit. Sensory: No sensory deficit. Motor: No weakness. Coordination: Coordination normal.      Gait: Gait normal.      Deep Tendon Reflexes: Reflexes normal.      Comments: Normal neurologic exam without focal deficit. Psychiatric:         Mood and Affect: Mood normal.         Behavior: Behavior normal.         Thought Content:  Thought content normal.         Judgment: Judgment normal.        Procedures    Results for orders placed or performed during the hospital encounter of 10/25/22   COVID-19, Rapid    Specimen: Nasopharyngeal   Result Value Ref Range    Source NASAL      SARS-CoV-2, Rapid Detected (AA) NOTD     Rapid influenza A/B antigens    Specimen: Nasal Washing   Result Value Ref Range    Influenza A Ag Negative NEG      Influenza B Ag Negative NEG      Source Nasopharyngeal     XR CHEST PORTABLE    Narrative    XR CHEST PORTABLE 10/25/2022 4:05 PM    HISTORY: Syncope    COMPARISON: Chest x-ray 2/1/2019. A portable AP view of the chest was obtained. Impression    The lungs are clear. The heart is normal in size. Prior median  sternotomy for CABG and valve replacement. Implantable cardiac device with  single lead is unchanged in position. No pneumothorax. No pleural effusions. CT HEAD WO CONTRAST    Narrative    Head CT    INDICATION: Syncopal episode. Multiple axial images obtained through the brain without intravenous contrast.   Radiation dose reduction techniques were used for this study: All CT scans  performed at this facility use one or all of the following: Automated exposure  control, adjustment of the mA and/or kVp according to patient's size, iterative  reconstruction. FINDINGS: No areas of abnormal attenuation are seen in the brain. There is no CT  evidence of acute hemorrhage or infarction. The ventricles are normal in size. There are no extra-axial fluid collections. No masses are seen. The sinuses are  clear. There are no bony lesions. Impression    No CT evidence of acute intracranial abnormality.      CBC with Auto Differential   Result Value Ref Range    WBC 10.5 4.3 - 11.1 K/uL    RBC 3.62 (L) 4.05 - 5.2 M/uL    Hemoglobin 9.3 (L) 11.7 - 15.4 g/dL    Hematocrit 31.4 (L) 35.8 - 46.3 %    MCV 86.7 82 - 102 FL    MCH 25.7 (L) 26.1 - 32.9 PG    MCHC 29.6 (L) 31.4 - 35.0 g/dL    RDW 15.1 (H) 11.9 - 14.6 %    Platelets 483 745 - 974 K/uL    MPV 9.9 9.4 - 12.3 FL    nRBC 0.00 0.0 - 0.2 K/uL    Differential Type AUTOMATED      Seg Neutrophils 80 (H) 43 - 78 %    Lymphocytes 11 (L) 13 - 44 %    Monocytes 8 4.0 - 12.0 %    Eosinophils % 0 (L) 0.5 - 7.8 %    Basophils 0 0.0 - 2.0 %    Immature Granulocytes 1 0.0 - 5.0 %    Segs Absolute 8.4 (H) 1.7 - 8.2 K/UL    Absolute Lymph # 1.2 0.5 - 4.6 K/UL    Absolute Mono # 0.8 0.1 - 1.3 K/UL Absolute Eos # 0.0 0.0 - 0.8 K/UL    Basophils Absolute 0.0 0.0 - 0.2 K/UL    Absolute Immature Granulocyte 0.1 0.0 - 0.5 K/UL   Comprehensive Metabolic Panel   Result Value Ref Range    Sodium 141 133 - 143 mmol/L    Potassium 3.5 3.5 - 5.1 mmol/L    Chloride 109 101 - 110 mmol/L    CO2 24 21 - 32 mmol/L    Anion Gap 8 2 - 11 mmol/L    Glucose 109 (H) 65 - 100 mg/dL    BUN 16 8 - 23 MG/DL    Creatinine 0.90 0.6 - 1.0 MG/DL    Est, Glom Filt Rate >60 >60 ml/min/1.73m2    Calcium 9.5 8.3 - 10.4 MG/DL    Total Bilirubin 0.5 0.2 - 1.1 MG/DL    ALT 16 12 - 65 U/L    AST 18 15 - 37 U/L    Alk Phosphatase 64 50 - 136 U/L    Total Protein 8.3 (H) 6.3 - 8.2 g/dL    Albumin 3.7 3.2 - 4.6 g/dL    Globulin 4.6 (H) 2.8 - 4.5 g/dL    Albumin/Globulin Ratio 0.8 0.4 - 1.6     Brain Natriuretic Peptide   Result Value Ref Range    NT Pro-BNP 2,497 (H) <450 PG/ML   Troponin   Result Value Ref Range    Troponin, High Sensitivity 15.5 (H) 0 - 14 pg/mL   EKG 12 Lead   Result Value Ref Range    Ventricular Rate 79 BPM    Atrial Rate 0 BPM    P-R Interval 171 ms    QRS Duration 132 ms    Q-T Interval 401 ms    QTc Calculation (Bazett) 460 ms    P Axis -53 degrees    R Axis 6 degrees    T Axis 43 degrees    Diagnosis Sinus or ectopic atrial rhythm         CT HEAD WO CONTRAST   Final Result   No CT evidence of acute intracranial abnormality. XR CHEST PORTABLE   Final Result   The lungs are clear. The heart is normal in size. Prior median   sternotomy for CABG and valve replacement. Implantable cardiac device with   single lead is unchanged in position. No pneumothorax. No pleural effusions. Voice dictation software was used during the making of this note. This software is not perfect and grammatical and other typographical errors may be present. This note has not been completely proofread for errors.      Pramod Wadema  10/25/22 [place]

## 2022-10-25 NOTE — ED TRIAGE NOTES
Pt arrives from home via [facility]. Called to home for syncopal episode. Pt was sitting in a chair when this occurred. Per sister, pt was out briefly. + orthostatic BP with EMS. Pt denies CP, SOB, abdominal pain and diarrhea. States she had some vomiting this morning. States she doesn't remember getting dizzy prior to syncope.
Clothing

## 2022-10-25 NOTE — ED NOTES
Report given to Gay Franco RN and care assumed at this time.       Geronimo Benavidez RN  10/25/22 [id number]

## 2022-10-25 NOTE — PROGRESS NOTES
TRANSFER - IN REPORT:    Verbal report received from Omar Castanon RN on Deanna Hall  being received from ED for routine progression of patient care      Report consisted of patient's Situation, Background, Assessment and   Recommendations(SBAR). Information from the following report(s) Nurse Handoff Report was reviewed with the receiving nurse. Opportunity for questions and clarification was provided. Assessment completed upon patient's arrival to unit and care assumed.

## 2022-10-25 NOTE — CONSENT
Informed Consent for Blood Component Transfusion Note    I have discussed with the patient the rationale for blood component transfusion; its benefits in treating or preventing fatigue, organ damage, or death; and its risk which includes mild transfusion reactions, rare risk of blood borne infection, or more serious but rare reactions. I have discussed the alternatives to transfusion, including the risk and consequences of not receiving transfusion. The patient had an opportunity to ask questions and had agreed to proceed with transfusion of blood components.     Electronically signed by Sandro Wayne DO, DO on 10/25/22 at 7:56 PM EDT

## 2022-10-26 LAB
25(OH)D3 SERPL-MCNC: 28.9 NG/ML (ref 30–100)
ABO + RH BLD: NORMAL
ALBUMIN SERPL-MCNC: 3.3 G/DL (ref 3.2–4.6)
ALBUMIN/GLOB SERPL: 0.8 {RATIO} (ref 0.4–1.6)
ALP SERPL-CCNC: 53 U/L (ref 50–136)
ALT SERPL-CCNC: 17 U/L (ref 12–65)
ANION GAP SERPL CALC-SCNC: 7 MMOL/L (ref 2–11)
AST SERPL-CCNC: 22 U/L (ref 15–37)
BASOPHILS # BLD: 0 K/UL (ref 0–0.2)
BASOPHILS NFR BLD: 0 % (ref 0–2)
BILIRUB SERPL-MCNC: 0.4 MG/DL (ref 0.2–1.1)
BLOOD GROUP ANTIBODIES SERPL: NORMAL
BUN SERPL-MCNC: 17 MG/DL (ref 8–23)
CALCIUM SERPL-MCNC: 9.2 MG/DL (ref 8.3–10.4)
CHLORIDE SERPL-SCNC: 111 MMOL/L (ref 101–110)
CO2 SERPL-SCNC: 22 MMOL/L (ref 21–32)
CREAT SERPL-MCNC: 0.8 MG/DL (ref 0.6–1)
DIFFERENTIAL METHOD BLD: ABNORMAL
EKG ATRIAL RATE: 0 BPM
EKG DIAGNOSIS: NORMAL
EKG P AXIS: -53 DEGREES
EKG P-R INTERVAL: 171 MS
EKG Q-T INTERVAL: 401 MS
EKG QRS DURATION: 132 MS
EKG QTC CALCULATION (BAZETT): 460 MS
EKG R AXIS: 6 DEGREES
EKG T AXIS: 43 DEGREES
EKG VENTRICULAR RATE: 79 BPM
EOSINOPHIL # BLD: 0 K/UL (ref 0–0.8)
EOSINOPHIL NFR BLD: 0 % (ref 0.5–7.8)
ERYTHROCYTE [DISTWIDTH] IN BLOOD BY AUTOMATED COUNT: 15.1 % (ref 11.9–14.6)
GLOBULIN SER CALC-MCNC: 4.1 G/DL (ref 2.8–4.5)
GLUCOSE SERPL-MCNC: 127 MG/DL (ref 65–100)
HCT VFR BLD AUTO: 26.6 % (ref 35.8–46.3)
HCT VFR BLD AUTO: 27.1 % (ref 35.8–46.3)
HCT VFR BLD AUTO: 28 % (ref 35.8–46.3)
HGB BLD-MCNC: 7.9 G/DL (ref 11.7–15.4)
HGB BLD-MCNC: 8.2 G/DL (ref 11.7–15.4)
HGB BLD-MCNC: 8.3 G/DL (ref 11.7–15.4)
IMM GRANULOCYTES # BLD AUTO: 0 K/UL (ref 0–0.5)
IMM GRANULOCYTES NFR BLD AUTO: 1 % (ref 0–5)
LYMPHOCYTES # BLD: 1 K/UL (ref 0.5–4.6)
LYMPHOCYTES NFR BLD: 14 % (ref 13–44)
MAGNESIUM SERPL-MCNC: 2 MG/DL (ref 1.8–2.4)
MCH RBC QN AUTO: 25.7 PG (ref 26.1–32.9)
MCHC RBC AUTO-ENTMCNC: 29.7 G/DL (ref 31.4–35)
MCV RBC AUTO: 86.6 FL (ref 82–102)
MM INDURATION, POC: 0 MM (ref 0–5)
MONOCYTES # BLD: 0.1 K/UL (ref 0.1–1.3)
MONOCYTES NFR BLD: 2 % (ref 4–12)
NEUTS SEG # BLD: 5.8 K/UL (ref 1.7–8.2)
NEUTS SEG NFR BLD: 83 % (ref 43–78)
NRBC # BLD: 0 K/UL (ref 0–0.2)
PLATELET # BLD AUTO: 279 K/UL (ref 150–450)
PMV BLD AUTO: 10.9 FL (ref 9.4–12.3)
POTASSIUM SERPL-SCNC: 4.3 MMOL/L (ref 3.5–5.1)
PPD, POC: NEGATIVE
PROT SERPL-MCNC: 7.4 G/DL (ref 6.3–8.2)
RBC # BLD AUTO: 3.07 M/UL (ref 4.05–5.2)
SODIUM SERPL-SCNC: 140 MMOL/L (ref 133–143)
SPECIMEN EXP DATE BLD: NORMAL
WBC # BLD AUTO: 7 K/UL (ref 4.3–11.1)

## 2022-10-26 PROCEDURE — 97530 THERAPEUTIC ACTIVITIES: CPT

## 2022-10-26 PROCEDURE — 36415 COLL VENOUS BLD VENIPUNCTURE: CPT

## 2022-10-26 PROCEDURE — 83735 ASSAY OF MAGNESIUM: CPT

## 2022-10-26 PROCEDURE — 85014 HEMATOCRIT: CPT

## 2022-10-26 PROCEDURE — G0378 HOSPITAL OBSERVATION PER HR: HCPCS

## 2022-10-26 PROCEDURE — 2580000003 HC RX 258: Performed by: FAMILY MEDICINE

## 2022-10-26 PROCEDURE — A4216 STERILE WATER/SALINE, 10 ML: HCPCS | Performed by: INTERNAL MEDICINE

## 2022-10-26 PROCEDURE — 97161 PT EVAL LOW COMPLEX 20 MIN: CPT

## 2022-10-26 PROCEDURE — 6370000000 HC RX 637 (ALT 250 FOR IP): Performed by: FAMILY MEDICINE

## 2022-10-26 PROCEDURE — C9113 INJ PANTOPRAZOLE SODIUM, VIA: HCPCS | Performed by: STUDENT IN AN ORGANIZED HEALTH CARE EDUCATION/TRAINING PROGRAM

## 2022-10-26 PROCEDURE — 6360000002 HC RX W HCPCS: Performed by: STUDENT IN AN ORGANIZED HEALTH CARE EDUCATION/TRAINING PROGRAM

## 2022-10-26 PROCEDURE — 80053 COMPREHEN METABOLIC PANEL: CPT

## 2022-10-26 PROCEDURE — 82306 VITAMIN D 25 HYDROXY: CPT

## 2022-10-26 PROCEDURE — 85025 COMPLETE CBC W/AUTO DIFF WBC: CPT

## 2022-10-26 PROCEDURE — 97535 SELF CARE MNGMENT TRAINING: CPT

## 2022-10-26 PROCEDURE — 96376 TX/PRO/DX INJ SAME DRUG ADON: CPT

## 2022-10-26 PROCEDURE — C9113 INJ PANTOPRAZOLE SODIUM, VIA: HCPCS | Performed by: INTERNAL MEDICINE

## 2022-10-26 PROCEDURE — 2580000003 HC RX 258: Performed by: STUDENT IN AN ORGANIZED HEALTH CARE EDUCATION/TRAINING PROGRAM

## 2022-10-26 PROCEDURE — 97165 OT EVAL LOW COMPLEX 30 MIN: CPT

## 2022-10-26 PROCEDURE — A4216 STERILE WATER/SALINE, 10 ML: HCPCS | Performed by: STUDENT IN AN ORGANIZED HEALTH CARE EDUCATION/TRAINING PROGRAM

## 2022-10-26 PROCEDURE — 2580000003 HC RX 258: Performed by: INTERNAL MEDICINE

## 2022-10-26 PROCEDURE — 6360000002 HC RX W HCPCS: Performed by: INTERNAL MEDICINE

## 2022-10-26 RX ORDER — SODIUM CHLORIDE 9 MG/ML
INJECTION, SOLUTION INTRAVENOUS CONTINUOUS
Status: DISCONTINUED | OUTPATIENT
Start: 2022-10-26 | End: 2022-10-26

## 2022-10-26 RX ADMIN — SODIUM CHLORIDE 40 MG: 9 INJECTION, SOLUTION INTRAMUSCULAR; INTRAVENOUS; SUBCUTANEOUS at 09:42

## 2022-10-26 RX ADMIN — SODIUM CHLORIDE, PRESERVATIVE FREE 10 ML: 5 INJECTION INTRAVENOUS at 21:42

## 2022-10-26 RX ADMIN — SODIUM CHLORIDE, PRESERVATIVE FREE 10 ML: 5 INJECTION INTRAVENOUS at 09:43

## 2022-10-26 RX ADMIN — CARVEDILOL 12.5 MG: 12.5 TABLET, FILM COATED ORAL at 09:42

## 2022-10-26 RX ADMIN — SODIUM CHLORIDE 40 MG: 9 INJECTION, SOLUTION INTRAMUSCULAR; INTRAVENOUS; SUBCUTANEOUS at 21:42

## 2022-10-26 RX ADMIN — ROSUVASTATIN CALCIUM 20 MG: 20 TABLET, COATED ORAL at 09:42

## 2022-10-26 NOTE — PROGRESS NOTES
Patient arrival on the floor via stretcher. Patient is alert anf oriented times 4. Respirations even and unlabored on room air. No distress noted at this time. No needs expressed. Pt instructed to call for assistance. Dual skin assessment completed with ARNAUD Clifford. Skin intact, within normal limits. Pt instructed to call before ambulating. Call light within reach. Will continue to monitor.

## 2022-10-26 NOTE — PROGRESS NOTES
ACUTE PHYSICAL THERAPY GOALS:   (Developed with and agreed upon by patient and/or caregiver.)  ALL GOALS MET: CLEAR FOR d/c  LTG:  (1.)Ms. Monica Carr will move from supine to sit and sit to supine , scoot up and down, and roll side to side in bed with INDEPENDENT within 7 treatment day(s). (2.)Ms. Monica Carr will transfer from bed to chair and chair to bed with INDEPENDENT using the least restrictive device within 7 treatment day(s). (3.)Ms. Monica Carr will ambulate with INDEPENDENT for 90 feet with the least restrictive device within 7 treatment day(s). (4.)Ms. Monica Carr will tolerate at least 5 min of dynamic standing activity to assist standing ADLs with the least restrictive device within 7 treatment days. ________________________________________________________________________________________________      PHYSICAL THERAPY Initial Assessment, Daily Note, Discharge, and PM  (Link to Caseload Tracking: PT Visit Days : 1  Acknowledge Orders  Time In/Out  PT Charge Capture  Rehab Caseload Tracker    Jose Harper is a 78 y.o. female   PRIMARY DIAGNOSIS: Syncope and collapse  Syncope and collapse [R55]  Melena [K92.1]       Reason for Referral: Generalized Muscle Weakness (M62.81)  Other lack of cordination (R27.8)  Difficulty in walking, Not elsewhere classified (R26.2)  Other abnormalities of gait and mobility (R26.89)  Observation: Payor: Rohit Hay / Plan: [facility] MEDICARE HMO/PPO / Product Type: *No Product type* /     ASSESSMENT:     REHAB RECOMMENDATIONS:   Recommendation to date pending progress:  Setting:  No further skilled therapy after discharge from hospital    Equipment:    None     ASSESSMENT:  Ms. Monica Carr presents in supine, she moves w/ independence, including at least 90' of ambulation in the room. She is clear for d/c at this time.       [address] \[facility]\"" [address] [address] AM-PAC 6 Clicks Basic Mobility Inpatient Short Form  AM-PAC Mobility Inpatient   How much difficulty turning over in bed?: None  How much difficulty sitting down on / standing up from a chair with arms?: None  How much difficulty moving from lying on back to sitting on side of bed?: None  How much help from another person moving to and from a bed to a chair?: None  How much help from another person needed to walk in hospital room?: None  How much help from another person for climbing 3-5 steps with a railing?: None  AM-PAC Inpatient Mobility Raw Score : 24  AM-PAC Inpatient T-Scale Score : 61.14  Mobility Inpatient CMS 0-100% Score: 0  Mobility Inpatient CMS G-Code Modifier : CH    SUBJECTIVE:   Ms. Escobedo Linear states, \"I do well\"     Social/Functional Lives With: Other (comment) (Sister)  Type of Home: House  Home Layout: One level  Home Access: Stairs to enter with rails  Entrance Stairs - Number of Steps: 7  Entrance Stairs - Rails: Both  Bathroom Shower/Tub: Tub/Shower unit  Bathroom Toilet: Standard  Bathroom Accessibility: Accessible  Receives Help From: Family  Homemaking Assistance: Independent  Homemaking Responsibilities: Yes  Ambulation Assistance: Independent  Transfer Assistance: Independent  Active : No  Patient's  Info: Sister  Mode of Transportation: Car  Occupation: Retired    OBJECTIVE:     PAIN: Renay Blight / O2: Zideisyund Graham / Liliawance Everette / Luz Valerio:   Pre Treatment: 0         Post Treatment: 0 Vitals        Oxygen      IV    RESTRICTIONS/PRECAUTIONS:                    GROSS EVALUATION: Intact Impaired (Comments):   AROM [x]     PROM [x]    Strength [x]     Balance [x]     Posture [x] N/A   Sensation [x]     Coordination [x]      Tone [x]     Edema [x]    Activity Tolerance [x]      []      COGNITION/  PERCEPTION: Intact Impaired (Comments):   Orientation [x]     Vision [x]     Hearing [x]     Cognition  []       MOBILITY: I Mod I S SBA CGA Min Mod Max Total  NT x2 Comments:   Bed Mobility    Rolling [x] [] [] [] [] [] [] [] [] [] []    Supine to Sit [x] [] [] [] [] [] [] [] [] [] []    Scooting [x] [] [] [] [] [] [] [] [] [] [] Sit to Supine [] [] [] [] [] [] [] [] [] [x] []    Transfers    Sit to Stand [x] [] [] [] [] [] [] [] [] [] []    Bed to Chair [x] [] [] [] [] [] [] [] [] [] []    Stand to Sit [x] [] [] [] [] [] [] [] [] [] []     [] [] [] [] [] [] [] [] [] [] []    I=Independent, Mod I=Modified Independent, S=Supervision, SBA=Standby Assistance, CGA=Contact Guard Assistance,   Min=Minimal Assistance, Mod=Moderate Assistance, Max=Maximal Assistance, Total=Total Assistance, NT=Not Tested    GAIT: I Mod I S SBA CGA Min Mod Max Total  NT x2 Comments:   Level of Assistance [x] [] [] [] [] [] [] [] [] [] []    Distance   > 90 feet    DME N/A    Gait Quality Decreased step clearance    Weightbearing Status      Stairs      I=Independent, Mod I=Modified Independent, S=Supervision, SBA=Standby Assistance, CGA=Contact Guard Assistance,   Min=Minimal Assistance, Mod=Moderate Assistance, Max=Maximal Assistance, Total=Total Assistance, NT=Not Tested    PLAN:   FREQUENCY AND DURATION: 3 times/week for duration of hospital stay or until stated goals are met, whichever comes first.    THERAPY PROGNOSIS: Excellent    PROBLEM LIST:   (Skilled intervention is medically necessary to address:)  Decreased ADL/Functional Activities  Decreased AROM/PROM INTERVENTIONS PLANNED:   (Benefits and precautions of physical therapy have been discussed with the patient.)  Self Care Training  Therapeutic Activity  Therapeutic Exercise/HEP  Neuromuscular Re-education  Gait Training  Manual Therapy  Modalities  Education       TREATMENT:   EVALUATION: LOW COMPLEXITY: (Untimed Charge)    TREATMENT:   Therapeutic Activity (9 Minutes): Therapeutic activity included Rolling, Supine to Sit, Transfer Training, Ambulation on level ground, Sitting balance , and Standing balance to improve functional Activity tolerance, Balance, Coordination, Mobility, Strength, and ROM.     TREATMENT GRID:  N/A    AFTER TREATMENT PRECAUTIONS: Call light within reach, Chair, and RN notified    INTERDISCIPLINARY COLLABORATION:  RN/ PCT and PT/ PTA    EDUCATION:      TIME IN/OUT:  Time In: 1413  Time Out: [address]  Minutes: 4790  162 Andrewe Vernia Ahumada, PT

## 2022-10-26 NOTE — CARE COORDINATION
MSN, CM:  spoke with patient this AM about discharge planning. Patient lives with her sister in own home. Patient is independent with all ADL's and requires no equipment for ambulation. Patient does not drive but her sister drives her to all appointments. Patient does have two sons that live locally. Patient declines any home services r/t patient's dogs. PT to evaluate and make recommendations. Case Management will continue to follow. 10/26/22 2344   Service Assessment   Patient Orientation Alert and Oriented   Cognition Alert   History Provided By Patient   Primary [address]   Patient's Healthcare Decision Maker is: Legal Next of Kin  (son)   PCP Verified by CM Yes   Last Visit to PCP Within last 3 months   Prior Functional Level Independent in ADLs/IADLs   Current Functional Level Independent in ADLs/IADLs   Can patient return to prior living arrangement Yes   Ability to make needs known: Good   Family able to assist with home care needs: Yes   Would you like for me to discuss the discharge plan with any other family members/significant others, and if so, who? No   Financial Resources Medicare;Medicaid; [employer]   Social/Functional History   Lives With Other (comment)  (Sister)   Type of [address] One level   [address] to enter with rails   Entrance Stairs - Number of Steps 7   Entrance Stairs - Rails Both   Bathroom Shower/Tub Tub/Shower unit   Bathroom Toilet Standard   Bathroom Accessibility Accessible   Receives Help From [facility] Yes   Ambulation Assistance Independent   Transfer Assistance Independent   Active  No   Patient's  Info Sister   Mode of Transportation Car   Occupation Retired   Discharge Planning   Living Arrangements Family Members  (Sister)   Current Services Prior To Admission None   DME Ordered?  No   Potential Assistance Purchasing Medications No Type of Home Care Services None   Patient expects to be discharged to: House   One/Two Story Residence One story   History of falls? 0

## 2022-10-26 NOTE — PROGRESS NOTES
[facility] Progress Note         Admit Date:  10/25/2022    Today's Date:  10/26/2022    CC:  Anemia, Melena    Subjective:     Patient reports black stool followed by 2 brown stools this am.  Denies GERD or abd pain. Tolerating breakfast.    Medications:   Current Facility-Administered Medications   Medication Dose Route Frequency    pantoprazole (PROTONIX) 40 mg in sodium chloride (PF) 10 mL injection  40 mg IntraVENous Daily    sodium chloride flush 0.9 % injection 5-40 mL  5-40 mL IntraVENous 2 times per day    sodium chloride flush 0.9 % injection 5-40 mL  5-40 mL IntraVENous PRN    0.9 % sodium chloride infusion   IntraVENous PRN    ondansetron (ZOFRAN-ODT) disintegrating tablet 4 mg  4 mg Oral Q8H PRN    Or    ondansetron (ZOFRAN) injection 4 mg  4 mg IntraVENous Q6H PRN    polyethylene glycol (GLYCOLAX) packet 17 g  17 g Oral Daily PRN    acetaminophen (TYLENOL) tablet 650 mg  650 mg Oral Q6H PRN    Or    acetaminophen (TYLENOL) suppository 650 mg  650 mg Rectal Q6H PRN    tuberculin injection 5 Units  5 Units IntraDERmal Once    guaiFENesin-dextromethorphan (ROBITUSSIN DM) 100-10 MG/5ML syrup 5 mL  5 mL Oral Q4H PRN    aluminum & magnesium hydroxide-simethicone (MAALOX) 200-200-20 MG/5ML suspension 30 mL  30 mL Oral Q6H PRN    rosuvastatin (CRESTOR) tablet 20 mg  20 mg Oral Daily with breakfast    carvedilol (COREG) tablet 12.5 mg  12.5 mg Oral BID WC       Review of Systems:  ROS was obtained, with pertinent positives as listed above. No chest pain or SOB. Diet:  REgular    Objective:   Vitals:  /65   Pulse 81   Temp 98.6 °F (37 °C)   Resp 17   Ht 5' 5\" (1.651 m)   Wt 150 lb (68 kg)   SpO2 99%   BMI 24.96 kg/m²   Intake/Output:  No intake/output data recorded. No intake/output data recorded. Exam:  General appearance: alert, cooperative, no distress  Lungs: clear to auscultation bilaterally anteriorly  Heart: regular rate and rhythm  Abdomen: soft, non-tender.  Bowel sounds normal. No masses, no organomegaly  Extremities: extremities normal, atraumatic, no cyanosis or edema  Neuro:  alert and oriented    Data Review (Labs):    Recent Labs     10/25/22  1655 10/25/22  2302 10/26/22  0417   WBC 10.5  --  7.0   HGB 9.3* 8.3* 7.9*   HCT 31.4* 28.0* 26.6*     --  279   MCV 86.7  --  86.6     --  140   K 3.5  --  4.3     --  111*   CO2 24  --  PENDING   BUN 16  --  17   CREATININE 0.90  --  0.80   CALCIUM 9.5  --  9.2   MG  --   --  2.0   GLUCOSE 109*  --  127*   ALKPHOS 64  --  53   AST 18  --  22   ALT 16  --  17   BILITOT 0.5  --  0.4   LABALBU 3.7  --  3.3   PROT 8.3*  --  7.4       Assessment:     Principal Problem:    Syncope and collapse  Active Problems:    COVID-19 with multiple comorbidities    S/P CABG (coronary artery bypass graft)    Carotid artery stenosis without cerebral infarction    Chronic systolic HF (heart failure) ([facility])    S/P mitral valve repair    Iron deficiency anemia due to chronic blood loss    Panlobular emphysema ([facility])    AICD (automatic cardioverter/defibrillator) present    Pure hypercholesterolemia    Coronary atherosclerosis of native coronary artery    Benign hypertensive heart disease with CHF (congestive heart failure) ([facility])  Resolved Problems:    * No resolved hospital problems. *    78 y.o. female with CHF EF 35-40%, MV repair, ICD, CABG who is seen in consultation at the request of Alvaro España for melena and anemia. She came to the ER after a witnessed syncopal event while sitting and was found to have Hg 9.3, down from normal months ago. She is COVID positive. Stool FOBT positive. No overt blood loss. 10/26/22:  Pt reports hx of GERD and prior EGD \"years ago\". Denies GERD currently. Hgb down some today to 7.9 (9.3 on admission). Asymptomatic from [facility].       Plan:     - Monitor H&H and if continued decline consider EGD tomorrow  - NPO after MN  - Will also need colonoscopy for screening and anemia which may be performed outpatient if stable    TIANNA Rock CNP  Patient is seen and examined in collaboration with Dr. Gabriel Brantley. Assessment and plan as per Dr. Gigi Pantoja.

## 2022-10-26 NOTE — PROGRESS NOTES
Patient is resting in bed. Respirations even and unlabored on room air. No distress noted at this time. Pt instructed to call for assistance. Call light within reach. Report given to RN.

## 2022-10-26 NOTE — PROGRESS NOTES
ACUTE OCCUPATIONAL THERAPY GOALS:   (Developed with and agreed upon by patient and/or caregiver.)  Pt will complete observed ADL and functional mobility with independence and appropriate safety awareness. - MET 10/26    Within 1 visit     OCCUPATIONAL THERAPY Initial Assessment, Daily Note, Discharge, and PM       OT Visit Days: 1  Acknowledge Orders  Time  OT Charge Capture  Rehab Caseload Tracker      Chyna Pierson is a 78 y.o. female   PRIMARY DIAGNOSIS: Syncope and collapse  Syncope and collapse [R55]  Melena [K92.1]       Reason for Referral: Generalized Muscle Weakness (M62.81)  Observation: Payor: Jing Salazar / Plan: [facility] [place] [facility] HMO/PPO / Product Type: *No Product type* /     ASSESSMENT:     REHAB RECOMMENDATIONS:   Recommendation to date pending progress:  Setting:  No further skilled therapy after discharge from hospital    Equipment:    None     ASSESSMENT:  Ms. Jose Luis Morrissey presents with syncope and melena. Pt is COVID-19 positive. Pt was found sitting in the chair upon arrival. Pt denies pain. Pt progressed from supervision to independence for sit to stand, functional mobility, and donning socks in sitting. Pt reported no SOB during functional mobility or ADL. Pt left sitting in the chair with call bell and needs within reach. Pt reports no difficulties with completing ADL and is functioning at or near baseline. Further OT skilled services is not needed.       [address] [facility] [address] [address] AM-[facility] 6 Clicks Daily Activity Inpatient Short Form:    AM-PAC Daily Activity Inpatient   How much help for putting on and taking off regular lower body clothing?: None  How much help for Bathing?: None  How much help for Toileting?: None  How much help for putting on and taking off regular upper body clothing?: None  How much help for taking care of personal grooming?: None  How much help for eating meals?: None  AM-[facility] Inpatient Daily Activity Raw Score: 24  AM-PAC Inpatient ADL T-Scale Score : 57.54  ADL Inpatient CMS 0-100% Score: 0  ADL Inpatient CMS G-Code Modifier : CH           SUBJECTIVE:     Ms. Benitez Camera states, \"You want me to walk around the room again\"     Social/Functional Lives With: Other (comment) (Sister)  Type of Home: House  Home Layout: One level  Home Access: Stairs to enter with rails  Entrance Stairs - Number of Steps: 7  Entrance Stairs - Rails: Both  Bathroom Shower/Tub: Tub/Shower unit  Bathroom Toilet: Standard  Bathroom Accessibility: Accessible  Receives Help From: Family  Homemaking Assistance: Independent  Homemaking Responsibilities: Yes  Ambulation Assistance: Independent  Transfer Assistance: Independent  Active : No  Patient's  Info: Sister  Mode of Transportation: Car  Occupation: Retired    OBJECTIVE:     Gavin Childs / Keshawn Keyes / Benjamin Counter: None    RESTRICTIONS/PRECAUTIONS:       PAIN: Roselie Cancer / O2:   Pre Treatment:   Pain Assessment: None - Denies Pain      Post Treatment: Same        Vitals          Oxygen            GROSS EVALUATION: INTACT IMPAIRED   (See Comments)   UE AROM [x] []Appears to be within functional limits during ADL    UE PROM [] []   Strength [x]       Posture / Balance [x] Posture: Good  Sitting - Static: Good  Sitting - Dynamic: Good  Standing - Static: Good  Standing - Dynamic: Good   Sensation [x]     Coordination [x]       Tone []       Edema [x]    Activity Tolerance [x]  Tolerated session well      Hand Dominance R [] L []      COGNITION/  PERCEPTION: INTACT IMPAIRED   (See Comments)   Orientation [x]  Appears oriented during conversation    Vision []     Hearing [x]     Cognition  [x]     Perception [x]       MOBILITY: I Mod I S SBA CGA Min Mod Max Total  NT x2 Comments:   Bed Mobility    Rolling [] [] [] [] [] [] [] [] [] [x] []    Supine to Sit [] [] [] [] [] [] [] [] [] [x] []    Scooting [] [] [] [] [] [] [] [] [] [x] []    Sit to Supine [] [] [] [] [] [] [] [] [] [x] []    Transfers    Sit to Stand [x] [] [x] [] [] [] [] [] [] [] []    Bed to Chair [] [] [] [] [] [] [] [] [] [x] []    Stand to Sit [x] [] [x] [] [] [] [] [] [] [] []    Tub/Shower [] [] [] [] [] [] [] [] [] [x] []     Toilet [] [] [] [] [] [] [] [] [] [x] []      [] [] [] [] [] [] [] [] [] [] []    I=Independent, Mod I=Modified Independent, S=Supervision/Setup, SBA=Standby Assistance, CGA=Contact Guard Assistance, Min=Minimal Assistance, Mod=Moderate Assistance, Max=Maximal Assistance, Total=Total Assistance, NT=Not Tested    ACTIVITIES OF DAILY LIVING: I Mod I S SBA CGA Min Mod Max Total NT Comments   BASIC ADLs:              Upper Body Bathing  [] [] [] [] [] [] [] [] [] [x]    Lower Body Bathing [] [] [] [] [] [] [] [] [] [x]    Toileting [] [] [] [] [] [] [] [] [] [x]    Upper Body Dressing [] [] [] [] [] [] [] [] [] [x]    Lower Body Dressing [x] [] [x] [] [] [] [] [] [] [] Donning socks in sitting    Feeding [] [] [] [] [] [] [] [] [] [x]    Grooming [] [] [] [] [] [] [] [] [] [x]    Personal Device Care [] [] [] [] [] [] [] [] [] [x]    Functional Mobility [x] [] [x] [] [] [] [] [] [] []    I=Independent, Mod I=Modified Independent, S=Supervision/Setup, SBA=Standby Assistance, CGA=Contact Guard Assistance, Min=Minimal Assistance, Mod=Moderate Assistance, Max=Maximal Assistance, Total=Total Assistance, NT=Not Tested    PLAN:   FREQUENCY/DURATION   OT Plan of Care:  (Discharge) for duration of hospital stay or until stated goals are met, whichever comes first.    PROBLEM LIST:   (Skilled intervention is medically necessary to address:)  N/A   INTERVENTIONS PLANNED:  (Benefits and precautions of occupational therapy have been discussed with the patient.)  Self Care Training         TREATMENT:     EVALUATION: LOW COMPLEXITY: (Untimed Charge)    TREATMENT:   Self Care (08 minutes): Patient participated in lower body dressing ADLs in unsupported sitting with no visual, verbal, manual, and tactile cueing to increase independence, decrease assistance required, and increase activity tolerance. Patient also participated in functional mobility and functional transfer training to increase independence, decrease assistance required, and increase activity tolerance.      TREATMENT GRID:  N/A    AFTER TREATMENT PRECAUTIONS: Call light within reach, Chair, and Needs within reach    INTERDISCIPLINARY COLLABORATION:  RN/ PCT, OT/ DOWNS, and OTS    EDUCATION:  Education Given To: Patient  Education Provided: Role of Therapy;Plan of Care  Education Method: Verbal  Barriers to Learning: None  Education Outcome: Verbalized understanding    TOTAL TREATMENT DURATION AND TIME:  Time In: 1500  Time Out: [address]  Minutes: [address] [facility]

## 2022-10-26 NOTE — PROGRESS NOTES
Bedside report received from night nurse Sandy Grayson. Assessment done as noted  Respiration even and unlabored 20/min; denies pain or nausea at present. Remains on droplet plus isolation for positive COVID test. Ordered PPE in place and in use. Encouraged to call with needs.

## 2022-10-26 NOTE — PROGRESS NOTES
Hospitalist Progress Note   Admit Date:  10/25/2022  3:45 PM   Name:  Albina Saha   Age:  78 y.o. Sex:  female  :  1945   MRN:  [id number]   Room:  [facility]/    Presenting Complaint: Loss of Consciousness     Reason(s) for Admission: Syncope and collapse [R55]  Melena [K92.1]     Hospital Course:     Albina Saha is a 78 y.o. female with medical history of CAD s/p CABG, Chronic HFrEF, AICD, s/p MVR, emphysema who presented from home via EMS after suffering syncopal episode whiling in her recliner. Witnessed by sister, patient lost consciousness briefly. Admits to vomiting earlier this morning. Minimal PO intake today. +orthostatic vitals per EMS. C/o myalgias, fatigue, sinus congestion. Noted dark stools but on PO FE. She has been out of PO FE for about a week and stools have been brown. In the ED, found COVID+ with hgb 9.3 down from 12 and positive fecal hemmocult. T99.9 RR 23 -104   Labs trop 15.5 BNP 2400  Hgb 9.3 d dimer 0.79     She rec'd 10 mg IV and pepcid 20 IV, started on IV PPI 40      GI consulted by ED with concerns for GIB       Subjective & 24hr Events (10/26/22): Patient examined at bedside. No acute overnight events. Up in chair at bedside. Had \"dark brown\" stools this morning. Denies red stools. Denies fevers/chills, chest pain, shortness of breath, vertigo. Denies nausea/vomiting. 10 point ROS negative except for HPI above. Assessment & Plan:     # ? ABLA due to upper GI bleeding  - has prior history of gastric ulcers  - PPI IV BID  - avoid NSAIDs  - GI following  - ?plan for EGD tomorrow  - serial Hgb/Hct  - transfuse for Hgb<7  - avoid heparin     # COVID  - on RA  - hold off on steroids  - jet nebs as needed  - supportive measures    # Syncope  - orthostatics positive  - likely due to above  - monitor   - management as above    # DHAVAL   - on iron supplementation as outpatient     # Chronic systolic heart failure  - appears compensated  - continue current regimen  - observe for signs of volume overload    # CAD s/p CABG  - statin  - ASA 81 mg      Anticipated discharge needs:      Pending clinical improvement as above, likely discharge in 1-2 days. Likely home at discharge. PT/OT consults and PPD ordered. Discussed with patient at bedside. All questions answered. Diet:  Diet NPO  ADULT DIET; Clear Liquid  DVT PPx: SCDs  Code status: Full Code    Hospital Problems:  Principal Problem:    Syncope and collapse  Active Problems:    COVID-19 with multiple comorbidities    S/P CABG (coronary artery bypass graft)    Carotid artery stenosis without cerebral infarction    Chronic systolic HF (heart failure) ([facility])    S/P mitral valve repair    Iron deficiency anemia due to chronic blood loss    Panlobular emphysema ([facility])    AICD (automatic cardioverter/defibrillator) present    Pure hypercholesterolemia    Coronary atherosclerosis of native coronary artery    Benign hypertensive heart disease with CHF (congestive heart failure) ([facility] [address]  Resolved Problems:    * No resolved hospital problems. *      Objective:   Patient Vitals for the past 24 hrs:   Temp Pulse Resp BP SpO2   10/26/22 1535 97.5 °F (36.4 °C) 84 18 (!) 105/49 100 %   10/26/22 1051 97.9 °F (36.6 °C) 97 19 110/60 100 %   10/26/22 0942 -- 81 -- 113/80 --   10/26/22 0701 98.6 °F (37 °C) 81 17 113/65 99 %   10/26/22 0515 -- 85 -- -- --   10/26/22 0326 98.4 °F (36.9 °C) 100 18 126/80 98 %   10/25/22 2350 98.4 °F (36.9 °C) (!) 105 18 123/87 99 %   10/25/22 2041 98.6 °F (37 °C) (!) 104 18 125/73 99 %   10/25/22 1957 -- (!) 103 29 -- 94 %   10/25/22 1945 -- (!) 101 19 139/61 98 %   10/25/22 1918 -- (!) 104 21 120/66 95 %   10/25/22 1830 -- 87 17 (!) 114/48 95 %   10/25/22 1800 -- 83 28 123/63 95 %       Oxygen Therapy  SpO2: 100 %  O2 Device: None (Room air)    Estimated body mass index is 24.96 kg/m² as calculated from the following:    Height as of this encounter: 5' 5\" (1.651 m).     Weight as of this encounter: 150 lb (68 kg). No intake or output data in the 24 hours ending 10/26/22 1756      Physical Exam:     Blood pressure (!) 105/49, pulse 84, temperature 97.5 °F (36.4 °C), temperature source Oral, resp. rate 18, height 5' 5\" (1.651 m), weight 150 lb (68 kg), SpO2 100 %. General:    Well nourished. Head:  Normocephalic, atraumatic  Eyes:  Sclerae appear normal.  Pupils equally round. ENT:  Nares appear normal, no drainage. Moist oral mucosa  Neck:  No restricted ROM. Trachea midline   CV:   RRR. No jugular venous distension. Lungs:   CTAB. No wheezing, rhonchi, or rales. Symmetric expansion. Abdomen: Bowel sounds present. Soft, nontender, nondistended. Extremities: No cyanosis or clubbing. No edema  Skin:     No rashes and normal coloration. Warm and dry. Neuro:  CN II-XII grossly intact. Sensation intact. A&Ox3  Psych:  Normal mood and affect.       I have personally reviewed labs and tests showing:  Recent Labs:  Recent Results (from the past 48 hour(s))   EKG 12 Lead    Collection Time: 10/25/22  3:59 PM   Result Value Ref Range    Ventricular Rate 79 BPM    Atrial Rate 0 BPM    P-R Interval 171 ms    QRS Duration 132 ms    Q-T Interval 401 ms    QTc Calculation (Bazett) 460 ms    P Axis -53 degrees    R Axis 6 degrees    T Axis 43 degrees    Diagnosis Sinus or ectopic atrial rhythm    CBC with Auto Differential    Collection Time: 10/25/22  4:55 PM   Result Value Ref Range    WBC 10.5 4.3 - 11.1 K/uL    RBC 3.62 (L) 4.05 - 5.2 M/uL    Hemoglobin 9.3 (L) 11.7 - 15.4 g/dL    Hematocrit 31.4 (L) 35.8 - 46.3 %    MCV 86.7 82 - 102 FL    MCH 25.7 (L) 26.1 - 32.9 PG    MCHC 29.6 (L) 31.4 - 35.0 g/dL    RDW 15.1 (H) 11.9 - 14.6 %    Platelets 951 770 - 908 K/uL    MPV 9.9 9.4 - 12.3 FL    nRBC 0.00 0.0 - 0.2 K/uL    Differential Type AUTOMATED      Seg Neutrophils 80 (H) 43 - 78 %    Lymphocytes 11 (L) 13 - 44 %    Monocytes 8 4.0 - 12.0 %    Eosinophils % 0 (L) 0.5 - 7.8 %    Basophils 0 0.0 - 2.0 %    Immature Granulocytes 1 0.0 - 5.0 %    Segs Absolute 8.4 (H) 1.7 - 8.2 K/UL    Absolute Lymph # 1.2 0.5 - 4.6 K/UL    Absolute Mono # 0.8 0.1 - 1.3 K/UL    Absolute Eos # 0.0 0.0 - 0.8 K/UL    Basophils Absolute 0.0 0.0 - 0.2 K/UL    Absolute Immature Granulocyte 0.1 0.0 - 0.5 K/UL   Comprehensive Metabolic Panel    Collection Time: 10/25/22  4:55 PM   Result Value Ref Range    Sodium 141 133 - 143 mmol/L    Potassium 3.5 3.5 - 5.1 mmol/L    Chloride 109 101 - 110 mmol/L    CO2 24 21 - 32 mmol/L    Anion Gap 8 2 - 11 mmol/L    Glucose 109 (H) 65 - 100 mg/dL    BUN 16 8 - 23 MG/DL    Creatinine 0.90 0.6 - 1.0 MG/DL    Est, Glom Filt Rate >60 >60 ml/min/1.73m2    Calcium 9.5 8.3 - 10.4 MG/DL    Total Bilirubin 0.5 0.2 - 1.1 MG/DL    ALT 16 12 - 65 U/L    AST 18 15 - 37 U/L    Alk Phosphatase 64 50 - 136 U/L    Total Protein 8.3 (H) 6.3 - 8.2 g/dL    Albumin 3.7 3.2 - 4.6 g/dL    Globulin 4.6 (H) 2.8 - 4.5 g/dL    Albumin/Globulin Ratio 0.8 0.4 - 1.6     D-Dimer, Quantitative    Collection Time: 10/25/22  4:55 PM   Result Value Ref Range    D-Dimer, Quant 0.79 (H) <0.56 ug/ml(FEU)   COVID-19, Rapid    Collection Time: 10/25/22  4:55 PM    Specimen: Nasopharyngeal   Result Value Ref Range    Source NASAL      SARS-CoV-2, Rapid Detected (AA) NOTD     Rapid influenza A/B antigens    Collection Time: 10/25/22  4:55 PM    Specimen: Nasal Washing   Result Value Ref Range    Influenza A Ag Negative NEG      Influenza B Ag Negative NEG      Source Nasopharyngeal     Brain Natriuretic Peptide    Collection Time: 10/25/22  4:55 PM   Result Value Ref Range    NT Pro-BNP 2,497 (H) <450 PG/ML   Troponin    Collection Time: 10/25/22  4:55 PM   Result Value Ref Range    Troponin, High Sensitivity 15.5 (H) 0 - 14 pg/mL   Troponin    Collection Time: 10/25/22  7:09 PM   Result Value Ref Range    Troponin, High Sensitivity 13.7 0 - 14 pg/mL   Vitamin B12    Collection Time: 10/25/22  7:09 PM   Result Value Ref Range    Vitamin B-12 677 193 - 986 pg/mL   C-Reactive Protein    Collection Time: 10/25/22  7:09 PM   Result Value Ref Range    CRP 1.3 (H) 0.0 - 0.9 mg/dL   Ferritin    Collection Time: 10/25/22  7:09 PM   Result Value Ref Range    Ferritin 9 8 - 388 NG/ML   Transferrin Saturation    Collection Time: 10/25/22  7:09 PM   Result Value Ref Range    Iron 20 (L) 35 - 150 ug/dL    TIBC 301 250 - 450 ug/dL    TRANSFERRIN SATURATION 7 (L) >20 %   Folate    Collection Time: 10/25/22  7:09 PM   Result Value Ref Range    Folate 32.7 (H) 3.1 - 17.5 ng/mL   Hemoglobin and Hematocrit    Collection Time: 10/25/22 11:02 PM   Result Value Ref Range    Hemoglobin 8.3 (L) 11.7 - 15.4 g/dL    Hematocrit 28.0 (L) 35.8 - 46.3 %   TYPE AND SCREEN    Collection Time: 10/25/22 11:02 PM   Result Value Ref Range    Crossmatch expiration date 10/28/2022,2359     ABO/Rh O POSITIVE     Antibody Screen NEG    Vitamin D 25 Hydroxy    Collection Time: 10/26/22  4:17 AM   Result Value Ref Range    Vit D, 25-Hydroxy 28.9 (L) 30.0 - 100.0 ng/mL   CBC with Auto Differential    Collection Time: 10/26/22  4:17 AM   Result Value Ref Range    WBC 7.0 4.3 - 11.1 K/uL    RBC 3.07 (L) 4.05 - 5.2 M/uL    Hemoglobin 7.9 (L) 11.7 - 15.4 g/dL    Hematocrit 26.6 (L) 35.8 - 46.3 %    MCV 86.6 82 - 102 FL    MCH 25.7 (L) 26.1 - 32.9 PG    MCHC 29.7 (L) 31.4 - 35.0 g/dL    RDW 15.1 (H) 11.9 - 14.6 %    Platelets 353 230 - 922 K/uL    MPV 10.9 9.4 - 12.3 FL    nRBC 0.00 0.0 - 0.2 K/uL    Differential Type AUTOMATED      Seg Neutrophils 83 (H) 43 - 78 %    Lymphocytes 14 13 - 44 %    Monocytes 2 (L) 4.0 - 12.0 %    Eosinophils % 0 (L) 0.5 - 7.8 %    Basophils 0 0.0 - 2.0 %    Immature Granulocytes 1 0.0 - 5.0 %    Segs Absolute 5.8 1.7 - 8.2 K/UL    Absolute Lymph # 1.0 0.5 - 4.6 K/UL    Absolute Mono # 0.1 0.1 - 1.3 K/UL    Absolute Eos # 0.0 0.0 - 0.8 K/UL    Basophils Absolute 0.0 0.0 - 0.2 K/UL    Absolute Immature Granulocyte 0.0 0.0 - 0.5 K/UL   Magnesium    Collection Time: 10/26/22  4:17 AM   Result Value Ref Range    Magnesium 2.0 1.8 - 2.4 mg/dL   Comprehensive Metabolic Panel    Collection Time: 10/26/22  4:17 AM   Result Value Ref Range    Sodium 140 133 - 143 mmol/L    Potassium 4.3 3.5 - 5.1 mmol/L    Chloride 111 (H) 101 - 110 mmol/L    CO2 22 21 - 32 mmol/L    Anion Gap 7 2 - 11 mmol/L    Glucose 127 (H) 65 - 100 mg/dL    BUN 17 8 - 23 MG/DL    Creatinine 0.80 0.6 - 1.0 MG/DL    Est, Glom Filt Rate >60 >60 ml/min/1.73m2    Calcium 9.2 8.3 - 10.4 MG/DL    Total Bilirubin 0.4 0.2 - 1.1 MG/DL    ALT 17 12 - 65 U/L    AST 22 15 - 37 U/L    Alk Phosphatase 53 50 - 136 U/L    Total Protein 7.4 6.3 - 8.2 g/dL    Albumin 3.3 3.2 - 4.6 g/dL    Globulin 4.1 2.8 - 4.5 g/dL    Albumin/Globulin Ratio 0.8 0.4 - 1.6     Hemoglobin and Hematocrit    Collection Time: 10/26/22  4:58 PM   Result Value Ref Range    Hemoglobin 8.2 (L) 11.7 - 15.4 g/dL    Hematocrit 27.1 (L) 35.8 - 46.3 %       I have personally reviewed imaging studies showing: Other Studies:  CT HEAD WO CONTRAST   Final Result   No CT evidence of acute intracranial abnormality. XR CHEST PORTABLE   Final Result   The lungs are clear. The heart is normal in size. Prior median   sternotomy for CABG and valve replacement. Implantable cardiac device with   single lead is unchanged in position. No pneumothorax. No pleural effusions.              Current Meds:  Current Facility-Administered Medications   Medication Dose Route Frequency    pantoprazole (PROTONIX) 40 mg in sodium chloride (PF) 0.9 % 10 mL injection  40 mg IntraVENous Q12H    sodium chloride flush 0.9 % injection 5-40 mL  5-40 mL IntraVENous 2 times per day    sodium chloride flush 0.9 % injection 5-40 mL  5-40 mL IntraVENous PRN    0.9 % sodium chloride infusion   IntraVENous PRN    ondansetron (ZOFRAN-ODT) disintegrating tablet 4 mg  4 mg Oral Q8H PRN    Or    ondansetron (ZOFRAN) injection 4 mg  4 mg IntraVENous Q6H PRN    polyethylene glycol (GLYCOLAX) packet 17 g  17 g Oral Daily PRN    acetaminophen (TYLENOL) tablet 650 mg  650 mg Oral Q6H PRN    Or    acetaminophen (TYLENOL) suppository 650 mg  650 mg Rectal Q6H PRN    tuberculin injection 5 Units  5 Units IntraDERmal Once    guaiFENesin-dextromethorphan (ROBITUSSIN DM) 100-10 MG/5ML syrup 5 mL  5 mL Oral Q4H PRN    aluminum & magnesium hydroxide-simethicone (MAALOX) 200-200-20 MG/5ML suspension 30 mL  30 mL Oral Q6H PRN    rosuvastatin (CRESTOR) tablet 20 mg  20 mg Oral Daily with breakfast    carvedilol (COREG) tablet 12.5 mg  12.5 mg Oral BID WC       Signed:  LOI WEINBERG,     Part of this note may have been written by using a voice dictation software. The note has been proof read but may still contain some grammatical/other typographical errors.

## 2022-10-26 NOTE — CONSULTS
[facility] Consult Note       Referring Physician:  Jazmin Shepherd Date:  10/25/2022    Admit Date:  10/25/2022    Chief Complaint:  melena, anemia    Subjective:     History of Present Illness:  Patient is a 78 y.o. female with CHF EF 35-40%, MV repair, ICD, CABG who is seen in consultation at the request of Ketan Parekh for melena and anemia. She came to the ER after a witnessed syncopal event while sitting and was found to have Hg 9.3, down from normal months ago. She is COVID positive. Stool FOBT positive. She had brown stool today. Previously, she had dark, constipated stool when taking iron pills. She denies additional ASA use, NSAID use, abdominal pain, epistaxis, BRBPR. She has never had EGD or colon, but believes she is doing annual FIT testing. CXR unremarkable. CT head negative. PMH:  Past Medical History:   Diagnosis Date    Acute mitral regurgitation     AICD (automatic cardioverter/defibrillator) present 12/23/2015    Anterior myocardial infarction [facility])     Arrhythmia     Benign hypertensive heart disease with CHF (congestive heart failure) ([facility] [address] 12/23/2015    CAD (coronary artery disease)     [id number]; PC 1999I; Ischemic CM EF 25%    Carotid artery stenosis without cerebral infarction 12/23/2015    Chronic ischemic heart disease 12/23/2015    Chronic systolic HF (heart failure) ([facility] [address] 3/17/2016    0/2009 - EF 30-35%.  02/2010 - Single leadICD - [employer] 02/2012:  EF 45-50% 03/2013:  EF 40-45% 09/2013:  EF 45% 09/2014:  EF 45% 09/2015:  EF 40-45%    Coronary atherosclerosis of native coronary artery     Dyspnea on exertion     GERD (gastroesophageal reflux disease)     GI bleed 6/30/2009    Hyperlipidemia     Hypertension     Influenza A (H5N1) 2/1/2019    Menopause     54's    OA (osteoarthritis)     PUD (peptic ulcer disease)     hx ulcer  dx 6/2009    S/P mitral valve repair 3/17/2016    Tobacco use disorder        PSH:  Past Surgical History:   Procedure Laterality Date    CARDIAC CATHETERIZATION      PCI  x 1 - pt does not have stent card     SECTION      x2    CORONARY ARTERY BYPASS GRAFT      MITRALPLASTY W CP BYPASS      with repair device    PACEMAKER      TUBAL LIGATION         Allergies: Allergies   Allergen Reactions    Influenza Virus Vaccine Other (See Comments)       Home Medications:  Prior to Admission medications    Medication Sig Start Date End Date Taking?  Authorizing Provider   carvedilol (COREG) 25 MG tablet TAKE 1 TABLET BY MOUTH TWICE DAILY WITH MEALS 22   Amna Nunes MD   lisinopril (PRINIVIL;ZESTRIL) 40 MG tablet Take 1 tablet by mouth daily 22   Norm Lundborg, APRN - CNP   amLODIPine (NORVASC) 5 MG tablet TAKE 1 TABLET BY MOUTH DAILY 22   TIANNA Ordaz - CNP   aspirin 81 MG EC tablet Take 81 mg by mouth daily 09   Ar Automatic Reconciliation   vitamin D3 (CHOLECALCIFEROL) 125 MCG (5000 UT) TABS tablet Take 5,000 Units by mouth daily    Ar Automatic Reconciliation   ferrous sulfate (FE TABS 325) 325 (65 Fe) MG EC tablet Take 325 mg by mouth 2 times daily 3/13/19   Ar Automatic Reconciliation   rosuvastatin (CRESTOR) 20 MG tablet TAKE 1 TABLET BY MOUTH DAILY 22   Ar Automatic Reconciliation       Hospital Medications:  Current Facility-Administered Medications   Medication Dose Route Frequency    pantoprazole (PROTONIX) 40 mg in sodium chloride (PF) 10 mL injection  40 mg IntraVENous Daily    sodium chloride flush 0.9 % injection 5-40 mL  5-40 mL IntraVENous 2 times per day    sodium chloride flush 0.9 % injection 5-40 mL  5-40 mL IntraVENous PRN    0.9 % sodium chloride infusion   IntraVENous PRN    ondansetron (ZOFRAN-ODT) disintegrating tablet 4 mg  4 mg Oral Q8H PRN    Or    ondansetron (ZOFRAN) injection 4 mg  4 mg IntraVENous Q6H PRN    polyethylene glycol (GLYCOLAX) packet 17 g  17 g Oral Daily PRN    acetaminophen (TYLENOL) tablet 650 mg  650 mg Oral Q6H PRN    Or    acetaminophen (TYLENOL) suppository 650 mg  650 mg Rectal Q6H PRN    tuberculin injection 5 Units  5 Units IntraDERmal Once    guaiFENesin-dextromethorphan (ROBITUSSIN DM) 100-10 MG/5ML syrup 5 mL  5 mL Oral Q4H PRN    aluminum & magnesium hydroxide-simethicone (MAALOX) 200-200-20 MG/5ML suspension 30 mL  30 mL Oral Q6H PRN    potassium chloride (KLOR-CON M) extended release tablet 40 mEq  40 mEq Oral Once    [START ON 10/26/2022] rosuvastatin (CRESTOR) tablet 20 mg  20 mg Oral Daily with breakfast    [START ON 10/26/2022] carvedilol (COREG) tablet 12.5 mg  12.5 mg Oral BID WC    lactated ringers bolus  500 mL IntraVENous Once     Current Outpatient Medications   Medication Sig    carvedilol (COREG) 25 MG tablet TAKE 1 TABLET BY MOUTH TWICE DAILY WITH MEALS    lisinopril (PRINIVIL;ZESTRIL) 40 MG tablet Take 1 tablet by mouth daily    amLODIPine (NORVASC) 5 MG tablet TAKE 1 TABLET BY MOUTH DAILY    aspirin 81 MG EC tablet Take 81 mg by mouth daily    vitamin D3 (CHOLECALCIFEROL) 125 MCG (5000 UT) TABS tablet Take 5,000 Units by mouth daily    ferrous sulfate (FE TABS 325) 325 (65 Fe) MG EC tablet Take 325 mg by mouth 2 times daily    rosuvastatin (CRESTOR) 20 MG tablet TAKE 1 TABLET BY MOUTH DAILY       Social History:  Social History     Tobacco Use    Smoking status: Former     Packs/day: 0.25     Types: Cigarettes     Start date: 1975     Quit date: 2009     Years since quittin.3    Smokeless tobacco: Never    Tobacco comments:     Quit smokin09   Substance Use Topics    Alcohol use: No       Family History:  Family History   Problem Relation Age of Onset    Heart Surgery Brother     Heart Disease Sister     Heart Disease Father     Heart Attack Father     Breast Cancer Neg Hx     Coronary Art Dis Other     No Known Problems Mother     Heart Disease Brother        Review of Systems:  A detailed 10 system ROS is obtained, with pertinent positives as listed above. All others are negative.     Diet:      Objective: Physical Exam:  Vitals:  /61   Pulse (!) 103   Temp 99.9 °F (37.7 °C) (Oral)   Resp 29   Ht 5' 5\" (1.651 m)   Wt 150 lb (68 kg)   SpO2 94%   BMI 24.96 kg/m²   Gen:  Pt is alert, cooperative, no acute distress  Skin:  Extremities and face reveal no rashes. No palmar erythema. No telangiectasias on the chest wall. HEENT: Sclerae anicteric. Extra-occular muscles are intact. No oral ulcers. No abnormal pigmentation of the lips. The neck is supple. Cardiovascular: Regular rate and rhythm. Systolic murmur, gallops, or rubs. Respiratory:  Comfortable breathing with no accessory muscle use. Clear breath sounds anteriorly with no wheezes, rales, or rhonchi. GI:  Abdomen nondistended, soft, and nontender. Normal active bowel sounds. No enlargement of the liver or spleen. No masses palpable. Rectal:  Deferred  Musculoskeletal:  No pitting edema of the lower legs. Extremities have good range of motion. No costovertebral tenderness. Neurological:  Gross memory appears intact. Patient is alert and oriented. Psychiatric:  Mood appears appropriate with judgement intact. Lymphatic:  No cervical or supraclavicular adenopathy.     Laboratory:    Recent Labs     10/25/22  1655   WBC 10.5   HGB 9.3*   HCT 31.4*      MCV 86.7      K 3.5      CO2 24   BUN 16          Assessment:       Principal Problem:    Syncope and collapse  Active Problems:    COVID-19 with multiple comorbidities    S/P CABG (coronary artery bypass graft)    Carotid artery stenosis without cerebral infarction    Chronic systolic HF (heart failure) ([facility])    S/P mitral valve repair    Iron deficiency anemia due to chronic blood loss    Panlobular emphysema ([facility])    AICD (automatic cardioverter/defibrillator) present    Pure hypercholesterolemia    Coronary atherosclerosis of native coronary artery    Benign hypertensive heart disease with CHF (congestive heart failure) ([facility] [address]  Resolved Problems:    * No resolved hospital problems. *     78 y.o. female with CHF EF 35-40%, MV repair, ICD, CABG who is seen in consultation at the request of Najma Richardson for melena and anemia. She came to the ER after a witnessed syncopal event while sitting and was found to have Hg 9.3, down from normal months ago. She is COVID positive. Stool FOBT positive. No overt blood loss.     Plan:       EGD and colonoscopy when COVID resolved unless acute bleeding  PPI  Avoid NSAIDs  Hold aspirin if able  Ok for diet    Corazon Ignacio MD  [facility], [place]

## 2022-10-27 ENCOUNTER — ANESTHESIA (OUTPATIENT)
Dept: ENDOSCOPY | Age: 77
DRG: 377 | End: 2022-10-27
Payer: MEDICARE

## 2022-10-27 ENCOUNTER — ANESTHESIA EVENT (OUTPATIENT)
Dept: ENDOSCOPY | Age: 77
DRG: 377 | End: 2022-10-27
Payer: MEDICARE

## 2022-10-27 PROBLEM — K92.2 UPPER GI BLEED: Status: ACTIVE | Noted: 2022-10-27

## 2022-10-27 LAB
ALBUMIN SERPL-MCNC: 3.1 G/DL (ref 3.2–4.6)
ALBUMIN/GLOB SERPL: 0.9 {RATIO} (ref 0.4–1.6)
ALP SERPL-CCNC: 48 U/L (ref 50–136)
ALT SERPL-CCNC: 15 U/L (ref 12–65)
ANION GAP SERPL CALC-SCNC: 1 MMOL/L (ref 2–11)
AST SERPL-CCNC: 19 U/L (ref 15–37)
BASOPHILS # BLD: 0 K/UL (ref 0–0.2)
BASOPHILS NFR BLD: 0 % (ref 0–2)
BILIRUB SERPL-MCNC: 0.4 MG/DL (ref 0.2–1.1)
BUN SERPL-MCNC: 19 MG/DL (ref 8–23)
CALCIUM SERPL-MCNC: 8.6 MG/DL (ref 8.3–10.4)
CHLORIDE SERPL-SCNC: 114 MMOL/L (ref 101–110)
CO2 SERPL-SCNC: 25 MMOL/L (ref 21–32)
CREAT SERPL-MCNC: 0.8 MG/DL (ref 0.6–1)
DIFFERENTIAL METHOD BLD: ABNORMAL
EOSINOPHIL # BLD: 0 K/UL (ref 0–0.8)
EOSINOPHIL NFR BLD: 0 % (ref 0.5–7.8)
ERYTHROCYTE [DISTWIDTH] IN BLOOD BY AUTOMATED COUNT: 15.1 % (ref 11.9–14.6)
GLOBULIN SER CALC-MCNC: 3.5 G/DL (ref 2.8–4.5)
GLUCOSE SERPL-MCNC: 90 MG/DL (ref 65–100)
HCT VFR BLD AUTO: 24.7 % (ref 35.8–46.3)
HGB BLD-MCNC: 7.4 G/DL (ref 11.7–15.4)
IMM GRANULOCYTES # BLD AUTO: 0 K/UL (ref 0–0.5)
IMM GRANULOCYTES NFR BLD AUTO: 1 % (ref 0–5)
LYMPHOCYTES # BLD: 1.3 K/UL (ref 0.5–4.6)
LYMPHOCYTES NFR BLD: 17 % (ref 13–44)
MAGNESIUM SERPL-MCNC: 2.2 MG/DL (ref 1.8–2.4)
MCH RBC QN AUTO: 25.8 PG (ref 26.1–32.9)
MCHC RBC AUTO-ENTMCNC: 30 G/DL (ref 31.4–35)
MCV RBC AUTO: 86.1 FL (ref 82–102)
MM INDURATION, POC: 0 MM (ref 0–5)
MONOCYTES # BLD: 0.6 K/UL (ref 0.1–1.3)
MONOCYTES NFR BLD: 8 % (ref 4–12)
NEUTS SEG # BLD: 6.1 K/UL (ref 1.7–8.2)
NEUTS SEG NFR BLD: 74 % (ref 43–78)
NRBC # BLD: 0 K/UL (ref 0–0.2)
PLATELET # BLD AUTO: 229 K/UL (ref 150–450)
PMV BLD AUTO: 10 FL (ref 9.4–12.3)
POTASSIUM SERPL-SCNC: 3.4 MMOL/L (ref 3.5–5.1)
PPD, POC: NEGATIVE
PROT SERPL-MCNC: 6.6 G/DL (ref 6.3–8.2)
RBC # BLD AUTO: 2.87 M/UL (ref 4.05–5.2)
SODIUM SERPL-SCNC: 140 MMOL/L (ref 133–143)
WBC # BLD AUTO: 8.1 K/UL (ref 4.3–11.1)

## 2022-10-27 PROCEDURE — 6360000002 HC RX W HCPCS: Performed by: INTERNAL MEDICINE

## 2022-10-27 PROCEDURE — 1100000000 HC RM PRIVATE

## 2022-10-27 PROCEDURE — 80053 COMPREHEN METABOLIC PANEL: CPT

## 2022-10-27 PROCEDURE — 2580000003 HC RX 258: Performed by: INTERNAL MEDICINE

## 2022-10-27 PROCEDURE — 7100000011 HC PHASE II RECOVERY - ADDTL 15 MIN: Performed by: INTERNAL MEDICINE

## 2022-10-27 PROCEDURE — 3700000000 HC ANESTHESIA ATTENDED CARE: Performed by: INTERNAL MEDICINE

## 2022-10-27 PROCEDURE — 36415 COLL VENOUS BLD VENIPUNCTURE: CPT

## 2022-10-27 PROCEDURE — 3700000001 HC ADD 15 MINUTES (ANESTHESIA): Performed by: INTERNAL MEDICINE

## 2022-10-27 PROCEDURE — 2500000003 HC RX 250 WO HCPCS: Performed by: NURSE ANESTHETIST, CERTIFIED REGISTERED

## 2022-10-27 PROCEDURE — A4216 STERILE WATER/SALINE, 10 ML: HCPCS | Performed by: INTERNAL MEDICINE

## 2022-10-27 PROCEDURE — 6360000002 HC RX W HCPCS: Performed by: NURSE ANESTHETIST, CERTIFIED REGISTERED

## 2022-10-27 PROCEDURE — 0DB78ZX EXCISION OF STOMACH, PYLORUS, VIA NATURAL OR ARTIFICIAL OPENING ENDOSCOPIC, DIAGNOSTIC: ICD-10-PCS | Performed by: INTERNAL MEDICINE

## 2022-10-27 PROCEDURE — 0DB68ZX EXCISION OF STOMACH, VIA NATURAL OR ARTIFICIAL OPENING ENDOSCOPIC, DIAGNOSTIC: ICD-10-PCS | Performed by: INTERNAL MEDICINE

## 2022-10-27 PROCEDURE — C9113 INJ PANTOPRAZOLE SODIUM, VIA: HCPCS | Performed by: INTERNAL MEDICINE

## 2022-10-27 PROCEDURE — 2709999900 HC NON-CHARGEABLE SUPPLY: Performed by: INTERNAL MEDICINE

## 2022-10-27 PROCEDURE — 2580000003 HC RX 258: Performed by: FAMILY MEDICINE

## 2022-10-27 PROCEDURE — 2580000003 HC RX 258: Performed by: NURSE ANESTHETIST, CERTIFIED REGISTERED

## 2022-10-27 PROCEDURE — 88312 SPECIAL STAINS GROUP 1: CPT

## 2022-10-27 PROCEDURE — 7100000010 HC PHASE II RECOVERY - FIRST 15 MIN: Performed by: INTERNAL MEDICINE

## 2022-10-27 PROCEDURE — 3609012400 HC EGD TRANSORAL BIOPSY SINGLE/MULTIPLE: Performed by: INTERNAL MEDICINE

## 2022-10-27 PROCEDURE — 6370000000 HC RX 637 (ALT 250 FOR IP): Performed by: FAMILY MEDICINE

## 2022-10-27 PROCEDURE — 83735 ASSAY OF MAGNESIUM: CPT

## 2022-10-27 PROCEDURE — 85025 COMPLETE CBC W/AUTO DIFF WBC: CPT

## 2022-10-27 PROCEDURE — 88305 TISSUE EXAM BY PATHOLOGIST: CPT

## 2022-10-27 RX ORDER — PROPOFOL 10 MG/ML
INJECTION, EMULSION INTRAVENOUS CONTINUOUS PRN
Status: DISCONTINUED | OUTPATIENT
Start: 2022-10-27 | End: 2022-10-27 | Stop reason: SDUPTHER

## 2022-10-27 RX ORDER — SODIUM CHLORIDE, SODIUM LACTATE, POTASSIUM CHLORIDE, CALCIUM CHLORIDE 600; 310; 30; 20 MG/100ML; MG/100ML; MG/100ML; MG/100ML
INJECTION, SOLUTION INTRAVENOUS CONTINUOUS PRN
Status: DISCONTINUED | OUTPATIENT
Start: 2022-10-27 | End: 2022-10-27 | Stop reason: SDUPTHER

## 2022-10-27 RX ORDER — ONDANSETRON 2 MG/ML
INJECTION INTRAMUSCULAR; INTRAVENOUS PRN
Status: DISCONTINUED | OUTPATIENT
Start: 2022-10-27 | End: 2022-10-27 | Stop reason: SDUPTHER

## 2022-10-27 RX ORDER — DEXAMETHASONE SODIUM PHOSPHATE 4 MG/ML
INJECTION, SOLUTION INTRA-ARTICULAR; INTRALESIONAL; INTRAMUSCULAR; INTRAVENOUS; SOFT TISSUE PRN
Status: DISCONTINUED | OUTPATIENT
Start: 2022-10-27 | End: 2022-10-27 | Stop reason: SDUPTHER

## 2022-10-27 RX ORDER — LIDOCAINE HYDROCHLORIDE 20 MG/ML
INJECTION, SOLUTION EPIDURAL; INFILTRATION; INTRACAUDAL; PERINEURAL PRN
Status: DISCONTINUED | OUTPATIENT
Start: 2022-10-27 | End: 2022-10-27 | Stop reason: SDUPTHER

## 2022-10-27 RX ADMIN — ROSUVASTATIN CALCIUM 20 MG: 20 TABLET, COATED ORAL at 08:42

## 2022-10-27 RX ADMIN — LIDOCAINE HYDROCHLORIDE 50 MG: 20 INJECTION, SOLUTION EPIDURAL; INFILTRATION; INTRACAUDAL; PERINEURAL at 16:24

## 2022-10-27 RX ADMIN — SODIUM CHLORIDE 40 MG: 9 INJECTION, SOLUTION INTRAMUSCULAR; INTRAVENOUS; SUBCUTANEOUS at 21:42

## 2022-10-27 RX ADMIN — CARVEDILOL 12.5 MG: 12.5 TABLET, FILM COATED ORAL at 08:43

## 2022-10-27 RX ADMIN — SODIUM CHLORIDE, PRESERVATIVE FREE 10 ML: 5 INJECTION INTRAVENOUS at 08:45

## 2022-10-27 RX ADMIN — ONDANSETRON 4 MG: 2 INJECTION INTRAMUSCULAR; INTRAVENOUS at 16:31

## 2022-10-27 RX ADMIN — SODIUM CHLORIDE, SODIUM LACTATE, POTASSIUM CHLORIDE, AND CALCIUM CHLORIDE: 600; 310; 30; 20 INJECTION, SOLUTION INTRAVENOUS at 16:04

## 2022-10-27 RX ADMIN — PROPOFOL 200 MCG/KG/MIN: 10 INJECTION, EMULSION INTRAVENOUS at 16:24

## 2022-10-27 RX ADMIN — SODIUM CHLORIDE 40 MG: 9 INJECTION, SOLUTION INTRAMUSCULAR; INTRAVENOUS; SUBCUTANEOUS at 08:45

## 2022-10-27 RX ADMIN — SODIUM CHLORIDE, PRESERVATIVE FREE 10 ML: 5 INJECTION INTRAVENOUS at 21:43

## 2022-10-27 RX ADMIN — DEXAMETHASONE SODIUM PHOSPHATE 10 MG: 4 INJECTION, SOLUTION INTRAMUSCULAR; INTRAVENOUS at 16:31

## 2022-10-27 ASSESSMENT — PAIN - FUNCTIONAL ASSESSMENT
PAIN_FUNCTIONAL_ASSESSMENT: NONE - DENIES PAIN

## 2022-10-27 NOTE — PROGRESS NOTES
TRANSFER - IN REPORT:    Verbal report received from [address] [facility] on Erin Desai  being received from [phone] for ordered procedure      Report consisted of patient's Situation, Background, Assessment and   Recommendations(SBAR). Information from the following report(s) Nurse Handoff Report was reviewed with the receiving nurse. Opportunity for questions and clarification was provided. Assessment completed upon patient's arrival to unit and care assumed.

## 2022-10-27 NOTE — ANESTHESIA POSTPROCEDURE EVALUATION
Department of Anesthesiology  Postprocedure Note    Patient: Albina Saha  MRN: [id number]  YOB: 1945  Date of evaluation: 10/27/2022      Procedure Summary     Date: 10/27/22 Room / Location: [facility] ENDO FLOURO 1 / [facility] ENDOSCOPY    Anesthesia Start: 9574 Anesthesia Stop: 1634    Procedure: EGD BIOPSY (Upper GI Region) Diagnosis:       Melena      (Melena [K92.1])    Surgeons: Moody Richey MD Responsible Provider: Sylvia Garcia MD    Anesthesia Type: TIVA ASA Status: 3          Anesthesia Type: TIVA    Sammy Phase I:      Sammy Phase II: Sammy Score: 10      Anesthesia Post Evaluation    Patient location during evaluation: PACU  Patient participation: complete - patient participated  Level of consciousness: awake  Airway patency: patent  Nausea & Vomiting: no nausea  Complications: no  Cardiovascular status: hemodynamically stable  Respiratory status: acceptable and nonlabored ventilation  Hydration status: stable  Multimodal analgesia pain management approach

## 2022-10-27 NOTE — PROGRESS NOTES
Hospitalist Progress Note   Admit Date:  10/25/2022  3:45 PM   Name:  Donna Palomo   Age:  78 y.o. Sex:  female  :  1945   MRN:  [id number]   Room:  [facility]/    Presenting Complaint: Loss of Consciousness     Reason(s) for Admission: Syncope and collapse [R55]  Melena [K92.1]  Upper GI bleed [K92.2]     Hospital Course:     Donna Palomo is a 78 y.o. female with medical history of CAD s/p CABG, Chronic HFrEF, AICD, s/p MVR, emphysema who presented from home via EMS after suffering syncopal episode whiling in her recliner. Witnessed by sister, patient lost consciousness briefly. Admits to vomiting earlier this morning. Minimal PO intake today. +orthostatic vitals per EMS. C/o myalgias, fatigue, sinus congestion. Noted dark stools but on PO FE. She has been out of PO FE for about a week and stools have been brown. In the ED, found COVID+ with hgb 9.3 down from 12 and positive fecal hemmocult. T99.9 RR 23 -104   Labs trop 15.5 BNP 2400  Hgb 9.3 d dimer 0.79     She rec'd 10 mg IV and pepcid 20 IV, started on IV PPI 40      GI consulted by ED with concerns for GIB       Subjective & 24hr Events (10/27/22): Patient examined at bedside. No acute overnight events. Wants something to eat. Denies red stools. Denies fevers/chills, chest pain, shortness of breath, vertigo. Denies nausea/vomiting. 10 point ROS negative except for HPI above. Assessment & Plan:     # ? ABLA due to upper GI bleeding  - has prior history of gastric ulcers  - PPI IV BID  - avoid NSAIDs  - GI following  - plan for EGD today  - serial Hgb/Hct  - transfuse for Hgb<7  - avoid heparin     # COVID  - on RA  - hold off on steroids  - jet nebs as needed  - supportive measures    # Syncope  - orthostatics positive upon admission  - no further symptoms  - likely due to above  - monitor   - management as above    # DHAVAL   - on iron supplementation as outpatient     # Chronic systolic heart failure  - appears compensated  - continue current regimen  - observe for signs of volume overload    # CAD s/p CABG  - statin  - ASA 81 mg      Anticipated discharge needs:      Pending clinical improvement as above, likely discharge home tomorrow. PT/OT consults and PPD ordered. Discussed with patient at bedside. All questions answered. Diet:  ADULT DIET; Regular; Low Fat/Low Chol/High Fiber/2 gm Na  DVT PPx: SCDs  Code status: Full Code    Hospital Problems:  Principal Problem:    Syncope and collapse  Active Problems:    COVID-19 with multiple comorbidities    Upper GI bleed    S/P CABG (coronary artery bypass graft)    Carotid artery stenosis without cerebral infarction    Chronic systolic HF (heart failure) ([facility])    S/P mitral valve repair    Iron deficiency anemia due to chronic blood loss    Panlobular emphysema ([facility])    AICD (automatic cardioverter/defibrillator) present    Pure hypercholesterolemia    Coronary atherosclerosis of native coronary artery    Benign hypertensive heart disease with CHF (congestive heart failure) ([facility] [place] [address]  Resolved Problems:    * No resolved hospital problems.  *      Objective:   Patient Vitals for the past 24 hrs:   Temp Pulse Resp BP SpO2   10/27/22 1702 (!) 88 °F (31.1 °C) 97 15 (!) 134/57 96 %   10/27/22 1652 -- 97 15 (!) 111/53 96 %   10/27/22 1643 -- 74 15 (!) 124/56 100 %   10/27/22 1638 -- 63 16 (!) 105/56 100 %   10/27/22 1633 -- 80 16 (!) 103/55 100 %   10/27/22 1447 98.1 °F (36.7 °C) 76 18 103/70 99 %   10/27/22 1043 97.9 °F (36.6 °C) 69 18 136/63 100 %   10/27/22 0843 -- 67 -- 113/60 --   10/27/22 0734 98.7 °F (37.1 °C) 67 18 113/60 99 %   10/27/22 0324 98.6 °F (37 °C) 94 18 121/72 100 %   10/26/22 2345 -- 69 -- -- --   10/26/22 2304 98.4 °F (36.9 °C) 74 18 (!) 105/53 95 %   10/26/22 1933 99.1 °F (37.3 °C) 75 18 114/61 99 %         Oxygen Therapy  SpO2: 96 %  Pulse Oximeter Device Mode: Continuous  Pulse Oximeter Device Location: Right, Finger  O2 Device: None (Room air)  O2 Flow Rate (L/min): 6 L/min    Estimated body mass index is 24.96 kg/m² as calculated from the following:    Height as of this encounter: 5' 5\" (1.651 m). Weight as of this encounter: 150 lb (68 kg). Intake/Output Summary (Last 24 hours) at 10/27/2022 1857  Last data filed at 10/27/2022 1845  Gross per 24 hour   Intake 340 ml   Output 0 ml   Net 340 ml           Physical Exam:     Blood pressure (!) 134/57, pulse 97, temperature (!) 88 °F (31.1 °C), resp. rate 15, height 5' 5\" (1.651 m), weight 150 lb (68 kg), SpO2 96 %. General:    Well nourished. Head:  Normocephalic, atraumatic  Eyes:  Sclerae appear normal.  Pupils equally round. ENT:  Nares appear normal, no drainage. Moist oral mucosa  Neck:  No restricted ROM. Trachea midline   CV:   RRR. No jugular venous distension. Lungs:   CTAB. No wheezing, rhonchi, or rales. Symmetric expansion. Abdomen: Bowel sounds present. Soft, nontender, nondistended. Extremities: No cyanosis or clubbing. No edema  Skin:     No rashes and normal coloration. Warm and dry. Neuro:  CN II-XII grossly intact. Sensation intact. A&Ox3  Psych:  Normal mood and affect.       I have personally reviewed labs and tests showing:  Recent Labs:  Recent Results (from the past 48 hour(s))   Troponin    Collection Time: 10/25/22  7:09 PM   Result Value Ref Range    Troponin, High Sensitivity 13.7 0 - 14 pg/mL   Vitamin B12    Collection Time: 10/25/22  7:09 PM   Result Value Ref Range    Vitamin B-12 677 193 - 986 pg/mL   C-Reactive Protein    Collection Time: 10/25/22  7:09 PM   Result Value Ref Range    CRP 1.3 (H) 0.0 - 0.9 mg/dL   Ferritin    Collection Time: 10/25/22  7:09 PM   Result Value Ref Range    Ferritin 9 8 - 388 NG/ML   Transferrin Saturation    Collection Time: 10/25/22  7:09 PM   Result Value Ref Range    Iron 20 (L) 35 - 150 ug/dL    TIBC 301 250 - 450 ug/dL    TRANSFERRIN SATURATION 7 (L) >20 %   Folate    Collection Time: 10/25/22  7:09 PM   Result Value Ref Range    Folate 32.7 (H) 3.1 - 17.5 ng/mL   Hemoglobin and Hematocrit    Collection Time: 10/25/22 11:02 PM   Result Value Ref Range    Hemoglobin 8.3 (L) 11.7 - 15.4 g/dL    Hematocrit 28.0 (L) 35.8 - 46.3 %   TYPE AND SCREEN    Collection Time: 10/25/22 11:02 PM   Result Value Ref Range    Crossmatch expiration date 10/28/2022,2359     ABO/Rh O POSITIVE     Antibody Screen NEG    Vitamin D 25 Hydroxy    Collection Time: 10/26/22  4:17 AM   Result Value Ref Range    Vit D, 25-Hydroxy 28.9 (L) 30.0 - 100.0 ng/mL   CBC with Auto Differential    Collection Time: 10/26/22  4:17 AM   Result Value Ref Range    WBC 7.0 4.3 - 11.1 K/uL    RBC 3.07 (L) 4.05 - 5.2 M/uL    Hemoglobin 7.9 (L) 11.7 - 15.4 g/dL    Hematocrit 26.6 (L) 35.8 - 46.3 %    MCV 86.6 82 - 102 FL    MCH 25.7 (L) 26.1 - 32.9 PG    MCHC 29.7 (L) 31.4 - 35.0 g/dL    RDW 15.1 (H) 11.9 - 14.6 %    Platelets 776 441 - 093 K/uL    MPV 10.9 9.4 - 12.3 FL    nRBC 0.00 0.0 - 0.2 K/uL    Differential Type AUTOMATED      Seg Neutrophils 83 (H) 43 - 78 %    Lymphocytes 14 13 - 44 %    Monocytes 2 (L) 4.0 - 12.0 %    Eosinophils % 0 (L) 0.5 - 7.8 %    Basophils 0 0.0 - 2.0 %    Immature Granulocytes 1 0.0 - 5.0 %    Segs Absolute 5.8 1.7 - 8.2 K/UL    Absolute Lymph # 1.0 0.5 - 4.6 K/UL    Absolute Mono # 0.1 0.1 - 1.3 K/UL    Absolute Eos # 0.0 0.0 - 0.8 K/UL    Basophils Absolute 0.0 0.0 - 0.2 K/UL    Absolute Immature Granulocyte 0.0 0.0 - 0.5 K/UL   Magnesium    Collection Time: 10/26/22  4:17 AM   Result Value Ref Range    Magnesium 2.0 1.8 - 2.4 mg/dL   Comprehensive Metabolic Panel    Collection Time: 10/26/22  4:17 AM   Result Value Ref Range    Sodium 140 133 - 143 mmol/L    Potassium 4.3 3.5 - 5.1 mmol/L    Chloride 111 (H) 101 - 110 mmol/L    CO2 22 21 - 32 mmol/L    Anion Gap 7 2 - 11 mmol/L    Glucose 127 (H) 65 - 100 mg/dL    BUN 17 8 - 23 MG/DL    Creatinine 0.80 0.6 - 1.0 MG/DL    Est, Glom Filt Rate >60 >60 ml/min/1.73m2    Calcium 9.2 8.3 - 10.4 Est, Glom Filt Rate >60 >60 ml/min/1.73m2    Calcium 8.6 8.3 - 10.4 MG/DL    Total Bilirubin 0.4 0.2 - 1.1 MG/DL    ALT 15 12 - 65 U/L    AST 19 15 - 37 U/L    Alk Phosphatase 48 (L) 50 - 136 U/L    Total Protein 6.6 6.3 - 8.2 g/dL    Albumin 3.1 (L) 3.2 - 4.6 g/dL    Globulin 3.5 2.8 - 4.5 g/dL    Albumin/Globulin Ratio 0.9 0.4 - 1.6         I have personally reviewed imaging studies showing: Other Studies:  CT HEAD WO CONTRAST   Final Result   No CT evidence of acute intracranial abnormality. XR CHEST PORTABLE   Final Result   The lungs are clear. The heart is normal in size. Prior median   sternotomy for CABG and valve replacement. Implantable cardiac device with   single lead is unchanged in position. No pneumothorax. No pleural effusions.              Current Meds:  Current Facility-Administered Medications   Medication Dose Route Frequency    pantoprazole (PROTONIX) 40 mg in sodium chloride (PF) 0.9 % 10 mL injection  40 mg IntraVENous Q12H    sodium chloride flush 0.9 % injection 5-40 mL  5-40 mL IntraVENous 2 times per day    sodium chloride flush 0.9 % injection 5-40 mL  5-40 mL IntraVENous PRN    0.9 % sodium chloride infusion   IntraVENous PRN    ondansetron (ZOFRAN-ODT) disintegrating tablet 4 mg  4 mg Oral Q8H PRN    Or    ondansetron (ZOFRAN) injection 4 mg  4 mg IntraVENous Q6H PRN    polyethylene glycol (GLYCOLAX) packet 17 g  17 g Oral Daily PRN    acetaminophen (TYLENOL) tablet 650 mg  650 mg Oral Q6H PRN    Or    acetaminophen (TYLENOL) suppository 650 mg  650 mg Rectal Q6H PRN    guaiFENesin-dextromethorphan (ROBITUSSIN DM) 100-10 MG/5ML syrup 5 mL  5 mL Oral Q4H PRN    aluminum & magnesium hydroxide-simethicone (MAALOX) 200-200-20 MG/5ML suspension 30 mL  30 mL Oral Q6H PRN    rosuvastatin (CRESTOR) tablet 20 mg  20 mg Oral Daily with breakfast    carvedilol (COREG) tablet 12.5 mg  12.5 mg Oral BID WC       Signed:  LOI WEINBERG DO    Part of this note may have been written by using a voice dictation software. The note has been proof read but may still contain some grammatical/other typographical errors.

## 2022-10-27 NOTE — PROGRESS NOTES
Pt resting comfortably in bed at this time. Respirations are even and unlabored on RA, no signs or symptoms of distress at this time. No complaints of pain at this time. Pt is alert and oriented x4. Call light in reach, safety measures in place, pt instructed to call with needs. Will continue to monitor.

## 2022-10-27 NOTE — PROGRESS NOTES
Returns back to room from [facility] via stretcher and nursing staff. Assessment done. Alert and awake.  Denies pain

## 2022-10-27 NOTE — PROGRESS NOTES
Bedside report received from night nurse Iglesia Almanza. Assessment done as noted  Respiration even and unlabored 20/min; denies pain or nausea at present. Remains on remote tele NSR with PAC 74 confirmed per monitor tech. Remains NPO per order. Encouraged to call with needs.

## 2022-10-27 NOTE — PROGRESS NOTES
TRANSFER - IN REPORT:    Verbal report received from [facility] on Evalina Starch  being received from GI Lab for routine progression of patient care      Report consisted of patient's Situation, Background, Assessment and   Recommendations(SBAR). Information from the following report(s) Nurse Handoff Report was reviewed with the receiving nurse. Opportunity for questions and clarification was provided. Assessment completed upon patient's arrival to unit and care assumed.

## 2022-10-27 NOTE — PROGRESS NOTES
Pt asleep in bed at this time. Respirations are even and unlabored on RA, no signs or symptoms of distress at this time. Pt is alert or oriented x4. No complaints of pain at this time. Call light in reach, safety measures in place. Will continue to monitor and prepare to give report to oncoming shift.

## 2022-10-27 NOTE — PROGRESS NOTES
TRANSFER - OUT REPORT:    Verbal report given to Dora on Kristie Conroy  being transferred to GI Lab for ordered procedure       Report consisted of patient's Situation, Background, Assessment and   Recommendations(SBAR). Information from the following report(s) Nurse Handoff Report was reviewed with the receiving nurse. Lines:   Peripheral IV 10/25/22 Right; Anterior Forearm (Active)   Site Assessment Clean, dry & intact 10/27/22 0425   Line Status Capped 10/27/22 0425   Line Care Connections checked and tightened 10/27/22 0425   Phlebitis Assessment No symptoms 10/27/22 0425   Infiltration Assessment 0 10/27/22 0425   Alcohol Cap Used Yes 10/27/22 0425   Dressing Status Clean, dry & intact 10/27/22 0425   Dressing Type Transparent 10/27/22 0425        Opportunity for questions and clarification was provided. Patient transported with:  Monitor remote telemetry -NSR with PVCs 74.  RA

## 2022-10-27 NOTE — ANESTHESIA PRE PROCEDURE
Department of Anesthesiology  Preprocedure Note       Name:  Sharla Us   Age:  78 y.o.  :  1945                                          MRN:  [id number]         Date:  10/27/2022      Surgeon: Donato Simpson):  Apollo Tabor MD    Procedure: Procedure(s):  EGD BIOPSY    Medications prior to admission:   Prior to Admission medications    Medication Sig Start Date End Date Taking?  Authorizing Provider   carvedilol (COREG) 25 MG tablet TAKE 1 TABLET BY MOUTH TWICE DAILY WITH MEALS 22   Addis Daniel MD   lisinopril (PRINIVIL;ZESTRIL) 40 MG tablet Take 1 tablet by mouth daily 22   Javy Bernard APRN - CNP   amLODIPine (NORVASC) 5 MG tablet TAKE 1 TABLET BY MOUTH DAILY 22   Allison Crystal APRN - CNP   aspirin 81 MG EC tablet Take 81 mg by mouth daily 09   Ar Automatic Reconciliation   vitamin D3 (CHOLECALCIFEROL) 125 MCG (5000 UT) TABS tablet Take 5,000 Units by mouth daily    Ar Automatic Reconciliation   ferrous sulfate (FE TABS 325) 325 (65 Fe) MG EC tablet Take 325 mg by mouth 2 times daily 3/13/19   Ar Automatic Reconciliation   rosuvastatin (CRESTOR) 20 MG tablet TAKE 1 TABLET BY MOUTH DAILY 22   Ar Automatic Reconciliation       Current medications:    Current Facility-Administered Medications   Medication Dose Route Frequency Provider Last Rate Last Admin    pantoprazole (PROTONIX) 40 mg in sodium chloride (PF) 0.9 % 10 mL injection  40 mg IntraVENous Q12H Gerardo Harrington, DO   40 mg at 10/27/22 0845    sodium chloride flush 0.9 % injection 5-40 mL  5-40 mL IntraVENous 2 times per day Briana Allendain, DO   10 mL at 10/27/22 0845    sodium chloride flush 0.9 % injection 5-40 mL  5-40 mL IntraVENous PRN Briana Allendain, DO        0.9 % sodium chloride infusion   IntraVENous PRN Briana Allendain, DO        ondansetron (ZOFRAN-ODT) disintegrating tablet 4 mg  4 mg Oral Q8H PRN Briana Allendain, DO        Or    ondansetron (ZOFRAN) injection 4 mg  4 mg IntraVENous Q6H PRN Briana Allendain, DO        polyethylene glycol (GLYCOLAX) packet 17 g  17 g Oral Daily PRN Briana Blanca, DO        acetaminophen (TYLENOL) tablet 650 mg  650 mg Oral Q6H PRN Briana Blanca, DO        Or    acetaminophen (TYLENOL) suppository 650 mg  650 mg Rectal Q6H PRN Briana Blanca, DO        guaiFENesin-dextromethorphan (ROBITUSSIN DM) 100-10 MG/5ML syrup 5 mL  5 mL Oral Q4H PRN Briana Blanca, DO        aluminum & magnesium hydroxide-simethicone (MAALOX) 200-200-20 MG/5ML suspension 30 mL  30 mL Oral Q6H PRN Briana Blanca, DO        rosuvastatin (CRESTOR) tablet 20 mg  20 mg Oral Daily with breakfast Briana Blanca, DO   20 mg at 10/27/22 0842    carvedilol (COREG) tablet 12.5 mg  12.5 mg Oral BID WC Birana Blanca, DO   12.5 mg at 10/27/22 4163       Allergies:     Allergies   Allergen Reactions    Influenza Virus Vaccine Other (See Comments)       Problem List:    Patient Active Problem List   Diagnosis Code    S/P CABG (coronary artery bypass graft) Z95.1    Carotid artery stenosis without cerebral infarction I65.29    Hypoxia R09.02    Chronic systolic HF (heart failure) ([facility]) I50.22    S/P mitral valve repair Z98.890    Iron deficiency anemia due to chronic blood loss D50.0    Panlobular emphysema ([facility] [address] J43.1    AICD (automatic cardioverter/defibrillator) present Z95.810    Pure hypercholesterolemia E78.00    Coronary atherosclerosis of native coronary artery I25.10    Benign hypertensive heart disease with CHF (congestive heart failure) ([facility] [address] I11.0    Syncope and collapse R55    COVID-19 with multiple comorbidities U07.1    Upper GI bleed K92.2       Past Medical History:        Diagnosis Date    Acute mitral regurgitation     AICD (automatic cardioverter/defibrillator) present 12/23/2015    Anterior myocardial infarction [facility])     Arrhythmia     Benign hypertensive heart disease with CHF (congestive heart failure) ([facility] [address] 12/23/2015    CAD (coronary artery disease)     [id number]; PC 1999I; Ischemic CM EF 25%    Carotid artery stenosis without cerebral infarction 2015    Chronic ischemic heart disease 2015    Chronic systolic HF (heart failure) ([facility] [address] 3/17/2016     - EF 30-35%. 2010 - Single leadICD - [employer] 2012:  EF 45-50% 2013:  EF 40-45% 2013:  EF 45% 2014:  EF 45% 2015:  EF 40-45%    Coronary atherosclerosis of native coronary artery     Dyspnea on exertion     GERD (gastroesophageal reflux disease)     GI bleed 2009    Hyperlipidemia     Hypertension     Influenza A (H5N1) 2019    Menopause     54's    OA (osteoarthritis)     PUD (peptic ulcer disease)     hx ulcer  dx 2009    S/P mitral valve repair 3/17/2016    Tobacco use disorder        Past Surgical History:        Procedure Laterality Date   Paras Miranda    PCI  x 1 - pt does not have stent card     SECTION      x2    CORONARY ARTERY BYPASS GRAFT      MITRALPLASTY W CP BYPASS      with repair device    PACEMAKER      TUBAL LIGATION         Social History:    Social History     Tobacco Use    Smoking status: Former     Packs/day: 0.25     Types: Cigarettes     Start date: 1975     Quit date: 2009     Years since quittin.3    Smokeless tobacco: Never    Tobacco comments:     Quit smokin09   Substance Use Topics    Alcohol use:  No                                Counseling given: Not Answered  Tobacco comments: Quit smokin09      Vital Signs (Current):   Vitals:    10/27/22 1447 10/27/22 1633 10/27/22 1638 10/27/22 1643   BP: 103/70 (!) 103/55 (!) 105/56 (!) 124/56   Pulse: 76 80 63 74   Resp: 18 16 16 15   Temp: 98.1 °F (36.7 °C)      TempSrc: Axillary      SpO2: 99% 100% 100% 100%   Weight:       Height:                                                  BP Readings from Last 3 Encounters:   10/27/22 (!) 124/56   22 132/78   22 100/60       NPO Status: Time of last liquid consumption: 2359                        Time of last solid consumption: 2359                        Date of last liquid consumption: 10/26/22                        Date of last solid food consumption: 10/26/22    BMI:   Wt Readings from Last 3 Encounters:   10/25/22 150 lb (68 kg)   07/06/22 151 lb (68.5 kg)   04/29/22 150 lb (68 kg)     Body mass index is 24.96 kg/m². CBC:   Lab Results   Component Value Date/Time    WBC 8.1 10/27/2022 04:56 AM    RBC 2.87 10/27/2022 04:56 AM    HGB 7.4 10/27/2022 04:56 AM    HCT 24.7 10/27/2022 04:56 AM    MCV 86.1 10/27/2022 04:56 AM    MCV 92.6 12/20/2021 08:35 AM    RDW 15.1 10/27/2022 04:56 AM     10/27/2022 04:56 AM       CMP:   Lab Results   Component Value Date/Time     10/27/2022 04:56 AM    K 3.4 10/27/2022 04:56 AM     10/27/2022 04:56 AM    CO2 25 10/27/2022 04:56 AM    BUN 19 10/27/2022 04:56 AM    CREATININE 0.80 10/27/2022 04:56 AM    GFRAA >60 06/28/2022 10:00 AM    AGRATIO 1.5 12/20/2021 08:30 AM    LABGLOM >60 10/27/2022 04:56 AM    GLUCOSE 90 10/27/2022 04:56 AM    PROT 6.6 10/27/2022 04:56 AM    CALCIUM 8.6 10/27/2022 04:56 AM    BILITOT 0.4 10/27/2022 04:56 AM    ALKPHOS 48 10/27/2022 04:56 AM    ALKPHOS 60 12/20/2021 08:30 AM    AST 19 10/27/2022 04:56 AM    ALT 15 10/27/2022 04:56 AM       POC Tests: No results for input(s): POCGLU, POCNA, POCK, POCCL, POCBUN, POCHEMO, POCHCT in the last 72 hours.     Coags: No results found for: PROTIME, INR, APTT    HCG (If Applicable): No results found for: PREGTESTUR, PREGSERUM, HCG, HCGQUANT     ABGs: No results found for: PHART, PO2ART, EZN6OFG, XHK5GPQ, BEART, N1ERUJLV     Type & Screen (If Applicable):  No results found for: LABABO, LABRH    Drug/Infectious Status (If Applicable):  No results found for: HIV, HEPCAB    COVID-19 Screening (If Applicable):   Lab Results   Component Value Date/Time    COVID19 Detected 10/25/2022 04:55 PM           Anesthesia Evaluation  Patient summary reviewed and Nursing notes reviewed no history of anesthetic complications:   Airway: Mallampati: II          Dental: normal exam         Pulmonary: breath sounds clear to auscultation  (+) pneumonia (active Covid 19): unresolved,  COPD:                             Cardiovascular:  Exercise tolerance: good (>4 METS),   (+) valvular problems/murmurs (s/p MVR):, pacemaker:, CAD:, CABG/stent:, CHF: systolic,         Rhythm: regular                      Neuro/Psych:   Negative Neuro/Psych ROS              GI/Hepatic/Renal:   (+) GERD:,           Endo/Other:    (+) blood dyscrasia: anemia:., .                 Abdominal:             Vascular: negative vascular ROS. Other Findings:           Anesthesia Plan      TIVA     ASA 3       Induction: intravenous. Anesthetic plan and risks discussed with patient.                         Marian Wilkinson MD   10/27/2022

## 2022-10-27 NOTE — PROGRESS NOTES
TRANSFER - OUT REPORT:    Verbal report given to Trish Bautista on Albina Angry  being transferred to [address] [place] [address] for routine post-op       Report consisted of patient's Situation, Background, Assessment and   Recommendations(SBAR). Information from the following report(s) Nurse Handoff Report was reviewed with the receiving nurse. Lines:   Peripheral IV 10/25/22 Right; Anterior Forearm (Active)   Site Assessment Clean, dry & intact 10/27/22 1550   Line Status Capped 10/27/22 1550   Line Care Cap changed 10/27/22 1550   Phlebitis Assessment No symptoms 10/27/22 1550   Infiltration Assessment 0 10/27/22 1550   Alcohol Cap Used Yes 10/27/22 1550   Dressing Status Clean, dry & intact 10/27/22 1550   Dressing Type Transparent 10/27/22 1550        Opportunity for questions and clarification was provided.       Patient transported with:  Registered Nurse

## 2022-10-28 VITALS
BODY MASS INDEX: 24.99 KG/M2 | TEMPERATURE: 98.6 F | SYSTOLIC BLOOD PRESSURE: 126 MMHG | OXYGEN SATURATION: 100 % | WEIGHT: 150 LBS | DIASTOLIC BLOOD PRESSURE: 80 MMHG | HEART RATE: 87 BPM | RESPIRATION RATE: 19 BRPM | HEIGHT: 65 IN

## 2022-10-28 LAB
ALBUMIN SERPL-MCNC: 3.2 G/DL (ref 3.2–4.6)
ALBUMIN/GLOB SERPL: 0.8 {RATIO} (ref 0.4–1.6)
ALP SERPL-CCNC: 52 U/L (ref 50–136)
ALT SERPL-CCNC: 19 U/L (ref 12–65)
ANION GAP SERPL CALC-SCNC: 6 MMOL/L (ref 2–11)
AST SERPL-CCNC: 27 U/L (ref 15–37)
BASOPHILS # BLD: 0 K/UL (ref 0–0.2)
BASOPHILS NFR BLD: 0 % (ref 0–2)
BILIRUB SERPL-MCNC: 0.4 MG/DL (ref 0.2–1.1)
BUN SERPL-MCNC: 16 MG/DL (ref 8–23)
CALCIUM SERPL-MCNC: 9.2 MG/DL (ref 8.3–10.4)
CHLORIDE SERPL-SCNC: 109 MMOL/L (ref 101–110)
CO2 SERPL-SCNC: 24 MMOL/L (ref 21–32)
CREAT SERPL-MCNC: 0.8 MG/DL (ref 0.6–1)
DIFFERENTIAL METHOD BLD: ABNORMAL
EOSINOPHIL # BLD: 0 K/UL (ref 0–0.8)
EOSINOPHIL NFR BLD: 0 % (ref 0.5–7.8)
ERYTHROCYTE [DISTWIDTH] IN BLOOD BY AUTOMATED COUNT: 15.3 % (ref 11.9–14.6)
GLOBULIN SER CALC-MCNC: 3.8 G/DL (ref 2.8–4.5)
GLUCOSE SERPL-MCNC: 129 MG/DL (ref 65–100)
HCT VFR BLD AUTO: 26.9 % (ref 35.8–46.3)
HGB BLD-MCNC: 7.9 G/DL (ref 11.7–15.4)
IMM GRANULOCYTES # BLD AUTO: 0 K/UL (ref 0–0.5)
IMM GRANULOCYTES NFR BLD AUTO: 1 % (ref 0–5)
LYMPHOCYTES # BLD: 0.9 K/UL (ref 0.5–4.6)
LYMPHOCYTES NFR BLD: 13 % (ref 13–44)
MAGNESIUM SERPL-MCNC: 2.3 MG/DL (ref 1.8–2.4)
MCH RBC QN AUTO: 25.6 PG (ref 26.1–32.9)
MCHC RBC AUTO-ENTMCNC: 29.4 G/DL (ref 31.4–35)
MCV RBC AUTO: 87.1 FL (ref 82–102)
MONOCYTES # BLD: 0.2 K/UL (ref 0.1–1.3)
MONOCYTES NFR BLD: 2 % (ref 4–12)
NEUTS SEG # BLD: 6.1 K/UL (ref 1.7–8.2)
NEUTS SEG NFR BLD: 84 % (ref 43–78)
NRBC # BLD: 0 K/UL (ref 0–0.2)
PLATELET # BLD AUTO: 246 K/UL (ref 150–450)
PMV BLD AUTO: 10.8 FL (ref 9.4–12.3)
POTASSIUM SERPL-SCNC: 4 MMOL/L (ref 3.5–5.1)
PROT SERPL-MCNC: 7 G/DL (ref 6.3–8.2)
RBC # BLD AUTO: 3.09 M/UL (ref 4.05–5.2)
SODIUM SERPL-SCNC: 139 MMOL/L (ref 133–143)
WBC # BLD AUTO: 7.2 K/UL (ref 4.3–11.1)

## 2022-10-28 PROCEDURE — 6370000000 HC RX 637 (ALT 250 FOR IP): Performed by: FAMILY MEDICINE

## 2022-10-28 PROCEDURE — 85025 COMPLETE CBC W/AUTO DIFF WBC: CPT

## 2022-10-28 PROCEDURE — A4216 STERILE WATER/SALINE, 10 ML: HCPCS | Performed by: INTERNAL MEDICINE

## 2022-10-28 PROCEDURE — 36415 COLL VENOUS BLD VENIPUNCTURE: CPT

## 2022-10-28 PROCEDURE — 2580000003 HC RX 258: Performed by: INTERNAL MEDICINE

## 2022-10-28 PROCEDURE — 6360000002 HC RX W HCPCS: Performed by: INTERNAL MEDICINE

## 2022-10-28 PROCEDURE — 2580000003 HC RX 258: Performed by: FAMILY MEDICINE

## 2022-10-28 PROCEDURE — 83735 ASSAY OF MAGNESIUM: CPT

## 2022-10-28 PROCEDURE — 80053 COMPREHEN METABOLIC PANEL: CPT

## 2022-10-28 PROCEDURE — C9113 INJ PANTOPRAZOLE SODIUM, VIA: HCPCS | Performed by: INTERNAL MEDICINE

## 2022-10-28 RX ORDER — PANTOPRAZOLE SODIUM 40 MG/1
40 TABLET, DELAYED RELEASE ORAL
Qty: 60 TABLET | Refills: 0 | Status: SHIPPED | OUTPATIENT
Start: 2022-10-28 | End: 2022-11-27

## 2022-10-28 RX ORDER — PANTOPRAZOLE SODIUM 40 MG/1
40 TABLET, DELAYED RELEASE ORAL
Status: DISCONTINUED | OUTPATIENT
Start: 2022-10-29 | End: 2022-10-28 | Stop reason: HOSPADM

## 2022-10-28 RX ADMIN — CARVEDILOL 12.5 MG: 12.5 TABLET, FILM COATED ORAL at 09:42

## 2022-10-28 RX ADMIN — ROSUVASTATIN CALCIUM 20 MG: 20 TABLET, COATED ORAL at 09:42

## 2022-10-28 RX ADMIN — SODIUM CHLORIDE 40 MG: 9 INJECTION, SOLUTION INTRAMUSCULAR; INTRAVENOUS; SUBCUTANEOUS at 09:42

## 2022-10-28 RX ADMIN — SODIUM CHLORIDE, PRESERVATIVE FREE 5 ML: 5 INJECTION INTRAVENOUS at 09:43

## 2022-10-28 NOTE — CARE COORDINATION
MSN, CM:  patient to be discharged home with no services ordered or requested. Patient agrees with this discharge plan and has met all milestones for this admission. Family to transport patient home. 10/28/22 0881   Service Assessment   Patient Orientation Alert and Oriented   Cognition Alert   Services At/After Discharge   Services At/After Discharge None   Mode of Transport at Discharge Other (see comment)  (family to transport)   Confirm Follow Up Transport Self   Condition of Participation: Discharge Planning   The Patient and/or Patient Representative was provided with a Choice of Provider? Patient   The Patient and/Or Patient Representative agree with the Discharge Plan?  Yes

## 2022-10-28 NOTE — DISCHARGE SUMMARY
Hospitalist Discharge Summary   Admit Date:  10/25/2022  3:45 PM   DC Note date: 10/28/2022  Name:  Emily Mcwilliams   Age:  78 y.o. Sex:  female  :  1945   MRN:  [id number]   Room:    PCP:  TIANNA Real CNP    Presenting Complaint: Loss of Consciousness     Initial Admission Diagnosis: Syncope and collapse [R55]  Melena [K92.1]  Upper GI bleed [K92.2]     Problem List for this Hospitalization (present on admission):    Principal Problem:    Syncope and collapse  Active Problems:    COVID-19 with multiple comorbidities    Upper GI bleed    S/P CABG (coronary artery bypass graft)    Carotid artery stenosis without cerebral infarction    Chronic systolic HF (heart failure) (HCC)    S/P mitral valve repair    Iron deficiency anemia due to chronic blood loss    Panlobular emphysema ([facility])    AICD (automatic cardioverter/defibrillator) present    Pure hypercholesterolemia    Coronary atherosclerosis of native coronary artery    Benign hypertensive heart disease with CHF (congestive heart failure) ([facility] [address]  Resolved Problems:    * No resolved hospital problems. *    Emily Mcwilliams is a 78 y.o. female with medical history of CAD s/p CABG, Chronic HFrEF, AICD, s/p MVR, emphysema who presented from home via EMS after suffering syncopal episode whiling in her recliner. Witnessed by sister, patient lost consciousness briefly. Admits to vomiting earlier this morning. Minimal PO intake today. +orthostatic vitals per EMS. C/o myalgias, fatigue, sinus congestion. Noted dark stools but on PO FE. She has been out of PO FE for about a week and stools have been brown. In the ED, found COVID+ with hgb 9.3 down from 12 and positive fecal hemmocult. T99.9 RR 23 -104   Labs trop 15.5 BNP 2400  Hgb 9.3 d dimer 0.79     She rec'd 10 mg IV and pepcid 20 IV, started on IV PPI 40      GI consulted by ED with concerns for GIB     Interval History (10/28): patient examined at bedside.  No acute overnight events. No more episodes of bleeding. No black/red stools. No chest pain or shortness of breath. Tolerating meal with no issues. Hospital Course:  Patient admitted for concerns for ABLA due to upper GIB. GI consulted and patient underwent uncomplicated EGD that showed duodenitis with biopsies taken and pending. She was found to be incidentally positive for COVID but has not required supplemental oxygen. She has improved clinically and has not had any further signs of bleeding. Will discharge home with PPI BID. Discussed with patient at bedside, details of hospitalization have been discussed with patient and she understands and agrees with plan of care and discharge home. Return precautions provided. Counseled to avoid BC/Goody powders, NSAIDs, and certain OTC products. All questions answered. Disposition: Home  Diet: ADULT DIET; Regular; Low Fat/Low Chol/High Fiber/2 gm Na  Code Status: Full Code    Follow Ups:   Follow-up Information     Young You, APRN - CNP Follow up on 11/4/2022. Specialty: Nurse Practitioner  Why: 3  Contact information:  Marie Gutierrez [address] [place] [address]  [phone]             [address] [place] [address] Follow up. Contact information:  [address] [facility] [address] [facility]  [phone]                     Time spent in patient discharge and coordination 36 minutes. Follow up labs/diagnostics (ultimately defer to outpatient provider):  Per PCP and GI    Plan was discussed with patient at bedside. All questions answered. Patient was stable at time of discharge. Instructions given to call a physician or return if any concerns.     Discharge Medication List as of 10/28/2022 10:37 AM        START taking these medications    Details   pantoprazole (PROTONIX) 40 MG tablet Take 1 tablet by mouth 2 times daily (before meals), Disp-60 tablet, R-0Print           CONTINUE these medications which have NOT CHANGED    Details   carvedilol (COREG) 25 MG tablet TAKE 1 TABLET BY MOUTH TWICE DAILY WITH MEALS, Disp-180 tablet, R-3Normal      lisinopril (PRINIVIL;ZESTRIL) 40 MG tablet Take 1 tablet by mouth daily, Disp-90 tablet, R-3Normal      amLODIPine (NORVASC) 5 MG tablet TAKE 1 TABLET BY MOUTH DAILY, Disp-90 tablet, R-3Normal      aspirin 81 MG EC tablet Take 81 mg by mouth dailyHistorical Med      vitamin D3 (CHOLECALCIFEROL) 125 MCG (5000 UT) TABS tablet Take 5,000 Units by mouth dailyHistorical Med      ferrous sulfate (FE TABS 325) 325 (65 Fe) MG EC tablet Take 325 mg by mouth 2 times dailyHistorical Med      rosuvastatin (CRESTOR) 20 MG tablet TAKE 1 TABLET BY MOUTH DAILYHistorical Med             Procedures done this admission:  Procedure(s):  EGD BIOPSY    Consults this admission:  None    Echocardiogram results:  04/13/22    TRANSTHORACIC ECHOCARDIOGRAM (TTE) COMPLETE (CONTRAST/BUBBLE/3D PRN) 04/13/2022  6:20 PM (Final)    Narrative  This is a summary report. The complete report is available in the patient's medical record. If you cannot access the medical record, please contact the sending organization for a detailed fax or copy. Left Ventricle: Left ventricle is moderately dilated. Normal wall thickness. Moderate global hypokinesis present. Moderately reduced left ventricular systolic function with a visually estimated EF of 35 - 40%. Abnormal diastolic function. Mitral Valve: Valve repaired by 26mm annular ring. Mild to moderate transvalvular regurgitation. ERO 0.24. RVol 43 mL. MV mean gradient is 4 mmHg at a HR of 60 BPM.    Tricuspid Valve: Mild to moderate transvalvular regurgitation. RVSP is 45 mmHg. Signed by: Phoebe Salazar MD on 4/13/2022  6:20 PM      Diagnostic Imaging/Tests:   CT HEAD WO CONTRAST    Result Date: 10/25/2022  No CT evidence of acute intracranial abnormality. XR CHEST PORTABLE    Result Date: 10/25/2022  The lungs are clear.   The heart is Date/Time    [id number] 2.460 06/28/2022 10:00 AM        Most Recent UA No results found for: COLORU, APPEARANCE, SPECGRAV, LABPH, PROTEINU, GLUCOSEU, KETUA, BILIRUBINUR, BLOODU, UROBILINOGEN, NITRU, LEUKOCYTESUR, WBCUA, RBCUA, EPITHUA, BACTERIA, LABCAST, MUCUS     No results for input(s): CULTURE in the last 720 hours.     All Labs from Last 24 Hrs:  Recent Results (from the past 24 hour(s))   CBC with Auto Differential    Collection Time: 10/28/22  3:43 AM   Result Value Ref Range    WBC 7.2 4.3 - 11.1 K/uL    RBC 3.09 (L) 4.05 - 5.2 M/uL    Hemoglobin 7.9 (L) 11.7 - 15.4 g/dL    Hematocrit 26.9 (L) 35.8 - 46.3 %    MCV 87.1 82 - 102 FL    MCH 25.6 (L) 26.1 - 32.9 PG    MCHC 29.4 (L) 31.4 - 35.0 g/dL    RDW 15.3 (H) 11.9 - 14.6 %    Platelets 580 485 - 608 K/uL    MPV 10.8 9.4 - 12.3 FL    nRBC 0.00 0.0 - 0.2 K/uL    Differential Type AUTOMATED      Seg Neutrophils 84 (H) 43 - 78 %    Lymphocytes 13 13 - 44 %    Monocytes 2 (L) 4.0 - 12.0 %    Eosinophils % 0 (L) 0.5 - 7.8 %    Basophils 0 0.0 - 2.0 %    Immature Granulocytes 1 0.0 - 5.0 %    Segs Absolute 6.1 1.7 - 8.2 K/UL    Absolute Lymph # 0.9 0.5 - 4.6 K/UL    Absolute Mono # 0.2 0.1 - 1.3 K/UL    Absolute Eos # 0.0 0.0 - 0.8 K/UL    Basophils Absolute 0.0 0.0 - 0.2 K/UL    Absolute Immature Granulocyte 0.0 0.0 - 0.5 K/UL   Magnesium    Collection Time: 10/28/22  3:43 AM   Result Value Ref Range    Magnesium 2.3 1.8 - 2.4 mg/dL   Comprehensive Metabolic Panel    Collection Time: 10/28/22  3:43 AM   Result Value Ref Range    Sodium 139 133 - 143 mmol/L    Potassium 4.0 3.5 - 5.1 mmol/L    Chloride 109 101 - 110 mmol/L    CO2 24 21 - 32 mmol/L    Anion Gap 6 2 - 11 mmol/L    Glucose 129 (H) 65 - 100 mg/dL    BUN 16 8 - 23 MG/DL    Creatinine 0.80 0.6 - 1.0 MG/DL    Est, Glom Filt Rate >60 >60 ml/min/1.73m2    Calcium 9.2 8.3 - 10.4 MG/DL    Total Bilirubin 0.4 0.2 - 1.1 MG/DL    ALT 19 12 - 65 U/L    AST 27 15 - 37 U/L    Alk Phosphatase 52 50 - 136 U/L    Total Protein 7.0 6.3 - 8.2 g/dL    Albumin 3.2 3.2 - 4.6 g/dL    Globulin 3.8 2.8 - 4.5 g/dL    Albumin/Globulin Ratio 0.8 0.4 - 1.6         Allergies   Allergen Reactions    Influenza Virus Vaccine Other (See Comments)     Immunization History   Administered Date(s) Administered    COVID-19, MODERNA BLUE border, Primary or Immunocompromised, (age 12y+), IM, 100 mcg/0.5mL 04/23/2021, 06/17/2021, 12/24/2021    Influenza Trivalent 11/12/2015, 02/22/2017    Influenza Virus Vaccine 11/12/2015, 02/22/2017, 09/25/2019    Influenza, FLUARIX, FLULAVAL, [address] (age 10 mo+) AND AFLURIA, (age 1 y+), PF, 0.5mL 09/25/2019    Influenza, High Dose (Fluzone 65 yrs and older) 10/14/2018    PPD Test 10/25/2022    Pneumococcal Conjugate 13-valent (Rlomizj45) 04/05/2018    Pneumococcal Polysaccharide (Likikypsx32) 11/04/2013    Zoster Recombinant (Shingrix) 04/24/2019       Recent Vital Data:  Patient Vitals for the past 24 hrs:   Temp Pulse Resp BP SpO2   10/28/22 0732 98.6 °F (37 °C) 87 19 126/80 100 %   10/28/22 0255 98.2 °F (36.8 °C) 96 19 (!) 110/58 98 %   10/27/22 2307 98.1 °F (36.7 °C) 86 18 113/67 98 %   10/27/22 1907 97.9 °F (36.6 °C) 80 18 (!) 104/54 98 %   10/27/22 1702 (!) 88 °F (31.1 °C) 97 15 (!) 134/57 96 %   10/27/22 1652 -- 97 15 (!) 111/53 96 %   10/27/22 1643 -- 74 15 (!) 124/56 100 %   10/27/22 1638 -- 63 16 (!) 105/56 100 %   10/27/22 1633 -- 80 16 (!) 103/55 100 %   10/27/22 1447 98.1 °F (36.7 °C) 76 18 103/70 99 %       Oxygen Therapy  SpO2: 100 %  Pulse Oximeter Device Mode: Continuous  Pulse Oximeter Device Location: Right, Finger  O2 Device: None (Room air)  O2 Flow Rate (L/min): 6 L/min    Estimated body mass index is 24.96 kg/m² as calculated from the following:    Height as of this encounter: 5' 5\" (1.651 m). Weight as of this encounter: 150 lb (68 kg).     Intake/Output Summary (Last 24 hours) at 10/28/2022 1237  Last data filed at 10/27/2022 1845  Gross per 24 hour   Intake 340 ml   Output 0 ml   Net 340 ml Physical Exam:    General:    Well nourished. No overt distress  Head:  Normocephalic, atraumatic  Eyes:  Sclerae appear normal.  Pupils equally round. HENT:  Nares appear normal, no drainage. Moist mucous membranes  Neck:  No restricted ROM. Trachea midline  CV:   RRR. No JVD  Lungs:   CTAB. No wheezing, rhonchi, or rales. Respirations even, unlabored  Abdomen:   Soft, nontender, nondistended. Extremities: Warm and dry. No cyanosis or clubbing. No edema. Skin:     No rashes. Normal coloration  Neuro:  CN II-XII grossly intact. Psych:  Normal mood and affect. Signed:  Silvio Arce DO    Part of this note may have been written by using a voice dictation software. The note has been proof read but may still contain some grammatical/other typographical errors.

## 2022-10-28 NOTE — PROGRESS NOTES
Pt is sleeping in bed at this time. Pt is on RA. Pt has even and unlabored respirations at this time. Pt has safety measures in place and call light within reach. Will prepare report for oncoming nurse.

## 2022-10-28 NOTE — PROGRESS NOTES
[facility] Progress Note         Admit Date:  10/25/2022    Today's Date:  10/28/2022    CC:  Anemia, Melena    Subjective:     Patient denies any melena this am.  Tolerated breakfast.  No other complaints. EGD 10/27 FINDINGS:  OROPHARYNX: Cords and pyriform recesses normal.  ESOPHAGUS: The esophagus is normal. The proximal, mid and distal portions are normal. The Z-Line is intact. GEJ is located at about 38 cm from the incisors. STOMACH: The fundus on antegrade and retroflex views shows a small hiatal hernia. There are no Dawson's erosions. The body is normal.  Very mild erythema noted in the antrum concerning for a mild gastritis. Multiple biopsies were taken from the antrum, incisura and body of the stomach using cold forceps to rule out H. pylori. DUODENUM: The bulb has mild edema and erythema concerning for a mild duodenitis and second portions are normal.  There is no old or new blood anywhere on this exam.  ASSESSMENT:  1. Mild Gastritis and Duodenitis  2. Small Hiatal Hernia  PLAN:  1. Follow up patholgoy  2. Treat for H. Pylori if positive  3. Avoid NSAIDs  4. Continue PPI QD  5. Monitor H/H. Transfuse for Hg < 8 given hx of CAD  6. If has further bleeding or worsening anemia, will consider inpatient colonoscopy. Otherwise, can do it as outpatient.   7. Okay to start cardiac diet    Medications:   Current Facility-Administered Medications   Medication Dose Route Frequency    pantoprazole (PROTONIX) 40 mg in sodium chloride (PF) 0.9 % 10 mL injection  40 mg IntraVENous Q12H    sodium chloride flush 0.9 % injection 5-40 mL  5-40 mL IntraVENous 2 times per day    sodium chloride flush 0.9 % injection 5-40 mL  5-40 mL IntraVENous PRN    0.9 % sodium chloride infusion   IntraVENous PRN    ondansetron (ZOFRAN-ODT) disintegrating tablet 4 mg  4 mg Oral Q8H PRN    Or    ondansetron (ZOFRAN) injection 4 mg  4 mg IntraVENous Q6H PRN    polyethylene glycol (GLYCOLAX) packet 17 g  17 g Oral Daily PRN    acetaminophen (TYLENOL) tablet 650 mg  650 mg Oral Q6H PRN    Or    acetaminophen (TYLENOL) suppository 650 mg  650 mg Rectal Q6H PRN    guaiFENesin-dextromethorphan (ROBITUSSIN DM) 100-10 MG/5ML syrup 5 mL  5 mL Oral Q4H PRN    aluminum & magnesium hydroxide-simethicone (MAALOX) 200-200-20 MG/5ML suspension 30 mL  30 mL Oral Q6H PRN    rosuvastatin (CRESTOR) tablet 20 mg  20 mg Oral Daily with breakfast    carvedilol (COREG) tablet 12.5 mg  12.5 mg Oral BID WC       Review of Systems:  ROS was obtained, with pertinent positives as listed above. No chest pain or SOB. Diet:  Regular    Objective:   Vitals:  /80   Pulse 87   Temp 98.6 °F (37 °C) (Oral)   Resp 19   Ht 5' 5\" (1.651 m)   Wt 150 lb (68 kg)   SpO2 100%   BMI 24.96 kg/m²   Intake/Output:  No intake/output data recorded. 10/26 1901 - 10/28 0700  In: 340 [P.O.:240; I.V.:100]  Out: 0   Exam:  General appearance: alert, cooperative, no distress  Lungs: clear to auscultation bilaterally anteriorly  Heart: regular rate and rhythm  Abdomen: soft, non-tender.  Bowel sounds normal. No masses, no organomegaly  Extremities: extremities normal, atraumatic, no cyanosis or edema  Neuro:  alert and oriented    Data Review (Labs):    Recent Labs     10/25/22  1655 10/25/22  2302 10/26/22  0417 10/26/22  1658 10/27/22  0456 10/28/22  0343   WBC 10.5  --  7.0  --  8.1 7.2   HGB 9.3* 8.3* 7.9* 8.2* 7.4* 7.9*   HCT 31.4* 28.0* 26.6* 27.1* 24.7* 26.9*     --  279  --  229 246   MCV 86.7  --  86.6  --  86.1 87.1     --  140  --  140 139   K 3.5  --  4.3  --  3.4* 4.0     --  111*  --  114* 109   CO2 24  --  22  --  25 24   BUN 16  --  17  --  19 16   CREATININE 0.90  --  0.80  --  0.80 0.80   CALCIUM 9.5  --  9.2  --  8.6 9.2   MG  --   --  2.0  --  2.2 2.3   GLUCOSE 109*  --  127*  --  90 129*   ALKPHOS 64  --  53  --  48* 52   AST 18  --  22  --  19 27   ALT 16  --  17  --  15 19   BILITOT 0.5  --  0.4  --  0.4 0.4 LABALBU 3.7  --  3.3  --  3.1* 3.2   PROT 8.3*  --  7.4  --  6.6 7.0         Assessment:     Principal Problem:    Syncope and collapse  Active Problems:    COVID-19 with multiple comorbidities    Upper GI bleed    S/P CABG (coronary artery bypass graft)    Carotid artery stenosis without cerebral infarction    Chronic systolic HF (heart failure) (HCC)    S/P mitral valve repair    Iron deficiency anemia due to chronic blood loss    Panlobular emphysema (HCC)    AICD (automatic cardioverter/defibrillator) present    Pure hypercholesterolemia    Coronary atherosclerosis of native coronary artery    Benign hypertensive heart disease with CHF (congestive heart failure) ([facility] [address]  Resolved Problems:    * No resolved hospital problems. *    78 y.o. female with CHF EF 35-40%, MV repair, ICD, CABG who is seen in consultation at the request of Jennifer Fall for melena and anemia. She came to the ER after a witnessed syncopal event while sitting and was found to have Hg 9.3, down from normal months ago. She is COVID positive. Stool FOBT positive. No overt blood loss. 10/26/22:  Pt reports hx of GERD and prior EGD \"years ago\". Denies GERD currently. Hgb down some today to 7.9 (9.3 on admission). Asymptomatic from COVID.      10/28/22:  EGD yesterday with mild Gastritis and duodenitis, small Hiatal Hernia. Hgb improved to 7.9. Plan:     - PPI qd PO  - Monitor H&H, stable today  - Will also need colonoscopy for screening and anemia which may be performed outpatient if stable  - GI will sign off and FU in our office to discuss colonoscopy     Larissa Noyola, TIANNA - CNP  Patient is seen and examined in collaboration with Dr. Iker Packer. Assessment and plan as per Dr. Tabitha Pruitt.

## 2022-10-28 NOTE — PROGRESS NOTES
Pt is resting in bed at this time. Pt is alert and oriented times 4. Pt is on RA. Pt has no s/sx of acute distress. Pt has no requests at this time. Pt has safety measures in place and call light within reach.

## 2022-10-28 NOTE — DISCHARGE INSTRUCTIONS
Leaving the Hospital After Treatment for COVID-19: Care Instructions  Overview     You are being sent home from the hospital after being treated for COVID-19. Being in the hospital can be hard, especially if you've been in the intensive care unit (ICU). Even though you're going home, you probably don't feel well yet. Healing from COVID-19 takes time. You may feel very tired for weeks or months afterward, especially if you were on a ventilator. It will take time to get back to your old level of activity. Some people may have long-lasting health problems. But most people can look forward to feeling a little better every day. If you were on a ventilator, your throat may be sore and your voice hoarse or raspy for a while. After leaving the hospital, some people have feelings of anxiety and depression. They may have nightmares. Or in their mind they may relive events that happened in the hospital (flashbacks). Reach out to your doctor if you're having trouble with these symptoms. Your doctor will tell you if you need to isolate yourself at home, and when you can end isolation. How can you self-isolate when you have COVID-19? If you have COVID-19, there are things you can do to help avoid spreading the virus to others. Stay home, and avoid contact with other people. Limit contact with people in your home. If possible, stay in a separate bedroom and use a separate bathroom. Wear a well-fitting mask when you are around other people. Avoid contact with pets and other animals. Cover your mouth and nose with a tissue when you cough or sneeze. Then throw it in the trash right away. Wash your hands often, especially after you cough or sneeze. Use soap and water, and scrub for at least 20 seconds. If soap and water aren't available, use an alcohol-based hand . Don't share personal household items. These include bedding, towels, cups and glasses, and eating utensils.   [email or web address] in the warmest water allowed for the fabric type, and dry it completely. It's okay to wash other people's laundry with yours. Clean and disinfect your home. Use household  and disinfectant wipes or sprays. If you have questions, visit cdc.gov to check the Quarantine and Isolation Calculator. Follow-up care is a key part of your treatment and safety. Be sure to make and go to all appointments, and call your doctor if you are having problems. It's also a good idea to know your test results and keep a list of the medicines you take. How can you care for yourself at home? Get plenty of rest. It can help you feel better. Be kind to yourself if it's taking longer than you expected to feel better. You've been through a stressful time. Get up and walk around every hour or two while you're resting. Slowly increase your activity as you start to feel better. Eat healthy foods. Drink plenty of fluids. If you have kidney, heart, or liver disease and have to limit fluids, talk with your doctor before you increase the amount of fluids you drink. If needed, take acetaminophen (Tylenol) or ibuprofen (Advil, Motrin) to reduce a fever. It may also help with muscle aches. Read and follow all instructions on the label. When should you call for help? Call 911 anytime you think you may need emergency care. For example, call if you have life-threatening symptoms, such as: You have severe trouble breathing. (You can't talk at all.)     You have constant chest pain or pressure. You are severely dizzy or lightheaded. You are confused or can't think clearly. You have pale, gray, or blue-colored skin or lips. You pass out (lose consciousness) or are very hard to wake up. You have loss of balance or trouble walking. You have trouble seeing out of one or both eyes. You have weakness or drooping on one side of the face. You have weakness or numbness in an arm or a leg. You have trouble speaking.      You have a severe headache. You have a seizure. Call your doctor now or seek immediate medical care if:    You have moderate trouble breathing. (You can't speak a full sentence.)     You are coughing up blood. You have signs of low blood pressure. These include feeling lightheaded; being too weak to stand; and having cold, pale, clammy skin. Watch closely for changes in your health, and be sure to contact your doctor if:    Your symptoms get worse. You are not getting better as expected. You have new or worse symptoms of anxiety, depression, nightmares, or flashbacks. Call before you go to the doctor's office. Follow their instructions. And wear a mask. Current as of: July 28, 2022               Content Version: 13.4  © 2006-2022 Healthwise, Web[employer]. Care instructions adapted under license by [facility] [place] ([facility]). If you have questions about a medical condition or this instruction, always ask your healthcare professional. [place] [address] any warranty or liability for your use of this information. Gastrointestinal Bleeding: Care Instructions  Your Care Instructions     The digestive or gastrointestinal tract goes from the mouth to the anus. It is often called the GI tract. Bleeding can happen anywhere in the GI tract. It may be caused by an ulcer, an infection, or cancer. It may also be caused by medicines such as aspirin or ibuprofen. Light bleeding may not cause any symptoms at first. But if you continue to bleed for a while, you may feel very weak or tired. Sudden, heavy bleeding means you need to see a doctor right away. This kind of bleeding can be very dangerous. But it can usually be cured or controlled. The doctor may do some tests to find the cause of your bleeding. Follow-up care is a key part of your treatment and safety. Be sure to make and go to all appointments, and call your doctor if you are having problems.  It's also a good idea to know your test results and keep a list of the medicines you take. How can you care for yourself at home? Be safe with medicines. Take your medicines exactly as prescribed. Call your doctor if you think you are having a problem with your medicine. You will get more details on the specific medicines your doctor prescribes. Do not take aspirin or other anti-inflammatory medicines, such as naproxen (Aleve) or ibuprofen (Advil, Motrin), without talking to your doctor first. Ask your doctor if it is okay to use acetaminophen (Tylenol). Do not drink alcohol. The bleeding may make you lose iron. So it's important to eat foods that have a lot of iron. These include red meat, shellfish, poultry, and eggs. They also include beans, raisins, whole-grain breads, and leafy green vegetables. If you want help planning meals, you can make an appointment with a dietitian. When should you call for help? Call 911 anytime you think you may need emergency care. For example, call if:    You have sudden, severe belly pain. You vomit blood or what looks like coffee grounds. You passed out (lost consciousness). Your stools are maroon or very bloody. Call your doctor now or seek immediate medical care if:    You are dizzy or lightheaded, or you feel like you may faint. Your stools are black and look like tar, or they have streaks of blood. You have belly pain. You vomit or have nausea. You have trouble swallowing, or it hurts when you swallow. Watch closely for changes in your health, and be sure to contact your doctor if:    You do not get better as expected. Where can you learn more? Go to https://Mapplasrocael.[employer]. org and sign in to your [employer] account. Enter C620 in the Dasdak box to learn more about \"Gastrointestinal Bleeding: Care Instructions. \"     If you do not have an account, please click on the \"Sign Up Now\" link.   Current as of: June 6, 2022               Content Version: 13.4  © 8659-0525 Healthwise, Incorporated. Care instructions adapted under license by [facility] ([facility]). If you have questions about a medical condition or this instruction, always ask your healthcare professional. [address] any warranty or liability for your use of this information. Lightheadedness or Faintness: Care Instructions  Your Care Instructions  Lightheadedness is a feeling that you are about to faint or \"pass out. \" You do not feel as if you or your surroundings are moving. It is different from vertigo, which is the feeling that you or things around you are spinning or tilting. Lightheadedness usually goes away or gets better when you lie down. If lightheadedness gets worse, it can lead to a fainting spell. It is common to feel lightheaded from time to time. Lightheadedness usually is not caused by a serious problem. It often is caused by a short-lasting drop in blood pressure and blood flow to your head that occurs when you get up too quickly from a seated or lying position. Follow-up care is a key part of your treatment and safety. Be sure to make and go to all appointments, and call your doctor if you are having problems. It's also a good idea to know your test results and keep a list of the medicines you take. How can you care for yourself at home? Lie down for 1 or 2 minutes when you feel lightheaded. After lying down, sit up slowly and remain sitting for 1 to 2 minutes before slowly standing up. Avoid movements, positions, or activities that have made you lightheaded in the past.  Get plenty of rest, especially if you have a cold or flu, which can cause lightheadedness. Make sure you drink plenty of fluids, especially if you have a fever or have been sweating. Do not drive or put yourself and others in danger while you feel lightheaded. When should you call for help? Call 911 anytime you think you may need emergency care. For example, call if:    You have symptoms of a stroke.  These may include:  Sudden numbness, tingling, weakness, or loss of movement in your face, arm, or leg, especially on only one side of your body. Sudden vision changes. Sudden trouble speaking. Sudden confusion or trouble understanding simple statements. Sudden problems with walking or balance. A sudden, severe headache that is different from past headaches. You have symptoms of a heart attack. These may include:  Chest pain or pressure, or a strange feeling in the chest.  Sweating. Shortness of breath. Nausea or vomiting. Pain, pressure, or a strange feeling in the back, neck, jaw, or upper belly or in one or both shoulders or arms. Lightheadedness or sudden weakness. A fast or irregular heartbeat. After you call 911, the  may tell you to chew 1 adult-strength or 2 to 4 low-dose aspirin. Wait for an ambulance. Do not try to drive yourself. Watch closely for changes in your health, and be sure to contact your doctor if:    Your lightheadedness gets worse or does not get better with home care. Where can you learn more? Go to https://[employer]persons.[employer]. org and sign in to your [employer] account. Enter K163 in the Poly[employer]otics box to learn more about \"Lightheadedness or Faintness: Care Instructions. \"     If you do not have an account, please click on the \"Sign Up Now\" link. Current as of: January 10, 2022               Content Version: 13.4  © 2006-2022 [employer]. Care instructions adapted under license by [facility] [place] ([facility]). If you have questions about a medical condition or this instruction, always ask your healthcare professional. [place] [address] any warranty or liability for your use of this information.   DISCHARGE SUMMARY from Nurse    PATIENT INSTRUCTIONS:    After general anesthesia or intravenous sedation, for 24 hours or while taking prescription Narcotics:  Limit your activities  Do not drive and operate hazardous machinery  Do not make important personal or business decisions  Do  not drink alcoholic beverages  If you have not urinated within 8 hours after discharge, please contact your surgeon on call. Report the following to your surgeon:  Excessive pain, swelling, redness or odor of or around the surgical area  Temperature over 100.5  Nausea and vomiting lasting longer than 4 hours or if unable to take medications  Any signs of decreased circulation or nerve impairment to extremity: change in color, persistent  numbness, tingling, coldness or increase pain  Any questions    What to do at Home:  Recommended activity: activity as tolerated,     If you experience any of the following symptoms increased shortness of breath, cough with yellow or green or bloody mucous, temperature >100.4, pain or nausea or vomiting unrelieved by medication then  please follow up with Physician or ER as needed. *  Please give a list of your current medications to your Primary Care Provider. *  Please update this list whenever your medications are discontinued, doses are      changed, or new medications (including over-the-counter products) are added. *  Please carry medication information at all times in case of emergency situations. These are general instructions for a healthy lifestyle:    No smoking/ No tobacco products/ Avoid exposure to second hand smoke  Surgeon General's Warning:  Quitting smoking now greatly reduces serious risk to your health. Obesity, smoking, and sedentary lifestyle greatly increases your risk for illness    A healthy diet, regular physical exercise & weight monitoring are important for maintaining a healthy lifestyle    You may be retaining fluid if you have a history of heart failure or if you experience any of the following symptoms:  Weight gain of 3 pounds or more overnight or 5 pounds in a week, increased swelling in our hands or feet or shortness of breath while lying flat in bed.   Please call your doctor as soon as you notice any of these symptoms; do not wait until your next office visit. The discharge information has been reviewed with the patient. The patient verbalized understanding. Discharge medications reviewed with the patient and appropriate educational materials and side effects teaching were provided.

## 2022-11-01 ENCOUNTER — CARE COORDINATION (OUTPATIENT)
Dept: OTHER | Facility: CLINIC | Age: 77
End: 2022-11-01

## 2022-11-01 NOTE — CARE COORDINATION
Care Transitions Outreach Attempt    Call within 2 business days of discharge: Yes   Attempted to reach patient for transitions of care follow up. Unable to reach patient. Patient: Yakov Watson Patient : 1945 MRN: [id number]    Last Discharge  Street       Date Complaint Diagnosis Description Type Department Provider    10/25/22 Loss of Consciousness Syncope and collapse . .. ED to Hosp-Admission (Discharged) (ADMITTED) SFD8MS Tra Almanza DO; Salo Monson . Jarred Dominguez Was this an external facility discharge?  No Discharge Facility: N/A    Noted following upcoming appointments from discharge chart review:   [facility] follow up appointment(s):   Future Appointments   Date Time Provider Lindy Luke   2022 10:00 AM [facility] DEVICE 39 [facility] AM[facility]   2022 10:30 AM Larry Agarwal MD UCDG [facility] AMB   2022  3:00 PM TIANNA Mcmahan - NP PST [facility] AMB   1/3/2023  8:20 AM PST LAB PST [facility] AMB   2023 10:00 AM Allison Fletcher APRN - CNP PST [facility] AMB   2023 10:50 AM [facility] BI ROOM 1 SFERMAM JEREMÍASE     Non-BS follow up appointment(s): none noted

## 2022-11-02 ENCOUNTER — CARE COORDINATION (OUTPATIENT)
Dept: OTHER | Facility: CLINIC | Age: 77
End: 2022-11-02

## 2022-11-02 NOTE — CARE COORDINATION
Care Transitions Outreach Attempt    Call within 2 business days of discharge: Yes   Attempted to reach patient for transitions of care follow up. Unable to reach patient. Patient: Peggy Hay Patient : 1945 MRN: [id number]    Last Discharge  Street       Date Complaint Diagnosis Description Type Department Provider    10/25/22 Loss of Consciousness Syncope and collapse . .. ED to Hosp-Admission (Discharged) (ADMITTED) SFD8MS Grace Gómez DO; Salo Monson . Les Pimple Les Pimple Was this an external facility discharge?  No Discharge Facility: N/A    Noted following upcoming appointments from discharge chart review:   [facility] follow up appointment(s):   Future Appointments   Date Time Provider Lindy Luke   2022 10:00 AM [facility] DEVICE 39 UCDG [facility] AMB   2022 10:30 AM Saima Vora MD UCDG [facility] AMB   2022  3:00 PM Errol Hodge APRN - NP PST [facility] AMB   1/3/2023  8:20 AM PST LAB PST [facility] AMB   2023 10:00 AM Allison Osborne APRN - CNP PST [facility] AMB   2023 10:50 AM [facility] BI ROOM 1 MARY ELLEN RILEY     Non-BS follow up appointment(s): none noted

## 2022-11-04 ENCOUNTER — OFFICE VISIT (OUTPATIENT)
Dept: CARDIOLOGY CLINIC | Age: 77
End: 2022-11-04
Payer: MEDICARE

## 2022-11-04 VITALS
SYSTOLIC BLOOD PRESSURE: 114 MMHG | DIASTOLIC BLOOD PRESSURE: 64 MMHG | HEART RATE: 84 BPM | HEIGHT: 65 IN | WEIGHT: 147 LBS | BODY MASS INDEX: 24.49 KG/M2

## 2022-11-04 DIAGNOSIS — I65.23 BILATERAL CAROTID ARTERY STENOSIS WITHOUT CEREBRAL INFARCTION: ICD-10-CM

## 2022-11-04 DIAGNOSIS — E78.00 PURE HYPERCHOLESTEROLEMIA: ICD-10-CM

## 2022-11-04 DIAGNOSIS — J43.1 PANLOBULAR EMPHYSEMA (HCC): ICD-10-CM

## 2022-11-04 DIAGNOSIS — Z95.810 AICD (AUTOMATIC CARDIOVERTER/DEFIBRILLATOR) PRESENT: ICD-10-CM

## 2022-11-04 DIAGNOSIS — I11.0 BENIGN HYPERTENSIVE HEART DISEASE WITH CHF (CONGESTIVE HEART FAILURE) (HCC): ICD-10-CM

## 2022-11-04 DIAGNOSIS — Z98.890 S/P MITRAL VALVE REPAIR: ICD-10-CM

## 2022-11-04 DIAGNOSIS — I25.118 ATHEROSCLEROSIS OF NATIVE CORONARY ARTERY OF NATIVE HEART WITH STABLE ANGINA PECTORIS (HCC): ICD-10-CM

## 2022-11-04 DIAGNOSIS — I50.22 CHRONIC SYSTOLIC HF (HEART FAILURE) (HCC): Primary | ICD-10-CM

## 2022-11-04 PROCEDURE — 1123F ACP DISCUSS/DSCN MKR DOCD: CPT | Performed by: INTERNAL MEDICINE

## 2022-11-04 PROCEDURE — 99214 OFFICE O/P EST MOD 30 MIN: CPT | Performed by: INTERNAL MEDICINE

## 2022-11-04 ASSESSMENT — ENCOUNTER SYMPTOMS
SHORTNESS OF BREATH: 0
ABDOMINAL PAIN: 0
BACK PAIN: 0
COUGH: 0

## 2022-11-04 NOTE — PROGRESS NOTES
[facility] CARDIOLOGY  [address], [address]      22      NAME:  Lui Oro  : 1945  MRN: [id number]      SUBJECTIVE:   Lui Oro is a 78 y.o. female seen for a follow up visit regarding the following:     No chief complaint on file. HPI:   She has history of CAD, HFrEF and single lead ICD. Overall she feels well. She is exercising regularly. Patient is asymptomatic. She has been exercising daily. C 2019 with stable CAD and 3/3 grafts patent. No CP. Echocardiogram 2022 with ejection fraction 35-40%. Past Medical History, Past Surgical History, Family history, Social History, and Medications were all reviewed with the patient today and updated as necessary. Current Outpatient Medications   Medication Sig Dispense Refill    pantoprazole (PROTONIX) 40 MG tablet Take 1 tablet by mouth 2 times daily (before meals) 60 tablet 0    carvedilol (COREG) 25 MG tablet TAKE 1 TABLET BY MOUTH TWICE DAILY WITH MEALS 180 tablet 3    lisinopril (PRINIVIL;ZESTRIL) 40 MG tablet Take 1 tablet by mouth daily 90 tablet 3    amLODIPine (NORVASC) 5 MG tablet TAKE 1 TABLET BY MOUTH DAILY 90 tablet 3    aspirin 81 MG EC tablet Take 81 mg by mouth daily      vitamin D3 (CHOLECALCIFEROL) 125 MCG (5000 UT) TABS tablet Take 5,000 Units by mouth daily      rosuvastatin (CRESTOR) 20 MG tablet TAKE 1 TABLET BY MOUTH DAILY      ferrous sulfate (FE TABS 325) 325 (65 Fe) MG EC tablet Take 325 mg by mouth 2 times daily (Patient not taking: Reported on 2022)       No current facility-administered medications for this visit.      Patient Active Problem List    Diagnosis Date Noted    Upper GI bleed 10/27/2022     Priority: Medium    Syncope and collapse 10/25/2022     Priority: Medium    COVID-19 with multiple comorbidities 10/25/2022     Priority: Medium    Iron deficiency anemia due to chronic blood loss 10/14/2019     Priority: Low    Panlobular emphysema ([facility] [address] 02/13/2019     Priority: Low    Hypoxia 02/01/2019     Priority: Low    Pure hypercholesterolemia 07/12/2016     Priority: Low    Chronic systolic HF (heart failure) ([facility] [address] 03/17/2016     Priority: Low     0/2009 - EF 30-35%. 02/2010 - Single leadICD - [employer]ron[employer]  02/2012:  EF 45-50%  03/2013:  EF 40-45%  09/2013:  EF 45%  09/2014:  EF 45%  09/2015:  EF 40-45%   09/2016:  EF 50-55%  01/2018:  EF 50%   01/2019:  EF 35%   03/2020:  EF 30-35%   03/2021:  EF 35-40%   04/2022;  Ef 35-40%      Formatting of this note might be different from the original.  0/2009 - EF 30-35%. 02/2010 - Single leadICD - Biotronik  02/2012:  EF 45-50%  03/2013:  EF 40-45%  09/2013:  EF 45%  09/2014:  EF 45%  09/2015:  EF 40-45%   09/2016:  EF 50-55%  01/2018:  EF 50%   01/2019:  EF 35%   03/2020:  EF 30-35%   03/2021:  EF 35-40%   04/2022;  Ef 35-40%    Last Assessment & Plan:   Formatting of this note is different from the original.  This condition is managed by Specialist.  Key CAD CHF Meds         lisinopril (PRINIVIL, ZESTRIL) 40 mg tablet  (Taking) Take 1 Tab by mouth daily. carvedilol (COREG) 25 mg tablet  (Taking) Take 1 Tab by mouth two (2) times daily (with meals). amLODIPine (NORVASC) 5 mg tablet  (Taking) Take 1 Tab by mouth daily. aspirin delayed-release 81 mg tablet  (Taking) take 1 Tab by mouth daily. lisinopril (PRINIVIL, ZESTRIL) 40 mg tablet Take 1 Tab by mouth daily for 90 days. Key Antihyperlipidemia Meds         rosuvastatin (CRESTOR) 20 mg tablet  (Taking) Take 1 Tab by mouth daily.        Lab Results   Component Value Date/Time    Sodium 141 02/19/2018 08:30 AM    Potassium 3.7 02/19/2018 08:30 AM      S/P mitral valve repair 03/17/2016     Priority: Low     02/2009 - MV repair  01/2018:  Normal function - Mild MR  01/2019:  Mild to moderate MR  04/2022:  Mild to moderate MR        Carotid artery stenosis without cerebral infarction 12/23/2015     Priority: Low     02/2014:  Class B bilaterally  2015:  < 50% bilaterally  2016:  < 50% bilateraly  10/2022:  < 50% bilateraly          AICD (automatic cardioverter/defibrillator) present 2015     Priority: Low    Benign hypertensive heart disease with CHF (congestive heart failure) ([facility] [address] 2015     Priority: Low     Last Assessment & Plan:   Formatting of this note is different from the original.  This condition is managed by Specialist.  Key CAD CHF Meds         lisinopril (PRINIVIL, ZESTRIL) 40 mg tablet  (Taking) Take 1 Tab by mouth daily. carvedilol (COREG) 25 mg tablet  (Taking) Take 1 Tab by mouth two (2) times daily (with meals). amLODIPine (NORVASC) 5 mg tablet  (Taking) Take 1 Tab by mouth daily. aspirin delayed-release 81 mg tablet  (Taking) take 1 Tab by mouth daily. lisinopril (PRINIVIL, ZESTRIL) 40 mg tablet Take 1 Tab by mouth daily for 90 days. Key Antihyperlipidemia Meds         rosuvastatin (CRESTOR) 20 mg tablet  (Taking) Take 1 Tab by mouth daily.        Lab Results   Component Value Date/Time    Sodium 141 2018 08:30 AM    Potassium 3.7 2018 08:30 AM      S/P CABG (coronary artery bypass graft) 09/10/2009     Priority: Low     LIMA-LAD, SVG-RI, SVG-PDA        Coronary atherosclerosis of native coronary artery 2009     Priority: Low     2.5x23 Vision to LAD done 2009 during MI  WEEMS-LAD, SVG-RI, SVG-PDA  2019:  Stable CAD and 3/3 grafts patent          Family History   Problem Relation Age of Onset    Heart Surgery Brother     Heart Disease Sister     Heart Disease Father     Heart Attack Father     Breast Cancer Neg Hx     Coronary Art Dis Other     No Known Problems Mother     Heart Disease Brother      Social History     Tobacco Use    Smoking status: Former     Packs/day: 0.25     Types: Cigarettes     Start date: 1975     Quit date: 2009     Years since quittin.3    Smokeless tobacco: Never    Tobacco comments:     Quit smokin09   Substance Use Topics    Alcohol use: No       Review Of Symptoms    Review of Systems   Constitutional: Negative for fever and malaise/fatigue. HENT:  Negative for nosebleeds. Cardiovascular:  Negative for chest pain, dyspnea on exertion and palpitations. Respiratory:  Negative for cough and shortness of breath. Musculoskeletal:  Negative for back pain, muscle cramps, muscle weakness and myalgias. Gastrointestinal:  Negative for abdominal pain. Neurological:  Negative for dizziness. Physical Exam  Blood pressure 114/64, pulse 84, height 5' 5\" (1.651 m), weight 147 lb (66.7 kg). Physical Exam  HENT:      Head: Normocephalic and atraumatic. Eyes:      Extraocular Movements: Extraocular movements intact. Cardiovascular:      Rate and Rhythm: Normal rate and regular rhythm. Heart sounds: No murmur heard. Pulmonary:      Effort: Pulmonary effort is normal.      Breath sounds: Normal breath sounds. Abdominal:      Palpations: Abdomen is soft. Musculoskeletal:         General: Normal range of motion. Skin:     General: Skin is warm and dry. Neurological:      General: No focal deficit present. Mental Status: She is oriented to person, place, and time. Medical problems, medical history and test results were reviewed with the patient today.       Lab Results   Component Value Date    CHOL 140 06/28/2022    CHOL 134 12/20/2021    CHOL 141 06/10/2021     Lab Results   Component Value Date    TRIG 69 06/28/2022    TRIG 56 12/20/2021    TRIG 57 06/10/2021     Lab Results   Component Value Date    HDL 55 06/28/2022    HDL 50 12/20/2021    HDL 52 06/10/2021     Lab Results   Component Value Date    LDLCALC 71.2 06/28/2022    LDLCALC 72 12/20/2021    LDLCALC 77 06/10/2021     Lab Results   Component Value Date    LABVLDL 13.8 06/28/2022    LABVLDL 15 03/09/2020    VLDL 12 12/20/2021    VLDL 12 06/10/2021    VLDL 13 09/22/2020     Lab Results   Component Value Date    CHOLHDLRATIO 2.5 06/28/2022 No results found for: LABA1C  No results found for: EAG     Lab Results   Component Value Date     10/28/2022    K 4.0 10/28/2022     10/28/2022    CO2 24 10/28/2022    BUN 16 10/28/2022    CREATININE 0.80 10/28/2022    GLUCOSE 129 (H) 10/28/2022    CALCIUM 9.2 10/28/2022    PROT 7.0 10/28/2022    LABALBU 3.2 10/28/2022    BILITOT 0.4 10/28/2022    ALKPHOS 52 10/28/2022    AST 27 10/28/2022    ALT 19 10/28/2022    LABGLOM >60 10/28/2022    GFRAA >60 06/28/2022    AGRATIO 1.5 12/20/2021    GLOB 3.8 10/28/2022       Lab Results   Component Value Date/Time     10/28/2022 03:43 AM    K 4.0 10/28/2022 03:43 AM     10/28/2022 03:43 AM    CO2 24 10/28/2022 03:43 AM    BUN 16 10/28/2022 03:43 AM    CREATININE 0.80 10/28/2022 03:43 AM    GLUCOSE 129 10/28/2022 03:43 AM    CALCIUM 9.2 10/28/2022 03:43 AM        Lab Results   Component Value Date    WBC 7.2 10/28/2022    HGB 7.9 (L) 10/28/2022    HCT 26.9 (L) 10/28/2022    MCV 87.1 10/28/2022     10/28/2022             ASSESSMENT:    Paras Bourne was seen today for follow-up. Diagnoses and all orders for this visit:    Atherosclerosis of native coronary artery of native heart with stable angina pectoris ([facility] [address] - No chest pain -  [facility] 02/2019 with stable CAD and 3/3 grafts patent. Patient currently denies angina. Dyslipidemia - Stable on rosuvastatin - LDL 72 12/2021    Chronic systolic HF (heart failure) (HCC) - EF reduced again and stable CAD with 3/3 grafts patent 02/2019. Ejection fraction remained stable by echo 04/2022. Patient is on appropriate therapy with carvedilol and lisinopril. Volume status is stable. Carotid artery stenosis without cerebral infarction, bilateral - < 50% bilaterally    Benign hypertensive heart disease with CHF (congestive heart failure) (HCC) -mildly elevated today. Will monitor for now as this is unusual for her. We will continue carvedilol amlodipine and lisinopril.     S/P mitral valve repair - Mild to moderate MR and will monitor. Stable by echo 04/2022    AICD (automatic cardioverter/defibrillator) present - No events. VVI and 0% RV pacing. Has underlying RBBB. COPD - Quit smoking many years ago    Anemia - Managed by PCP    Problem List Items Addressed This Visit          Circulatory    AICD (automatic cardioverter/defibrillator) present (Chronic)    Carotid artery stenosis without cerebral infarction    Chronic systolic HF (heart failure) (HCC) - Primary    Relevant Orders    Transthoracic echocardiogram (TTE) complete with contrast, bubble, strain, and 3D PRN    Coronary atherosclerosis of native coronary artery    Benign hypertensive heart disease with CHF (congestive heart failure) (HCC)       Respiratory    Panlobular emphysema ([address]       Other    S/P mitral valve repair (Chronic)    Pure hypercholesterolemia       There are no discontinued medications. Patient has been instructed and agrees to call our office with any issues or other concerns related to their cardiac condition(s) and/or complaint(s). Return in about 6 months (around 5/4/2023).        Rubin Carrasco MD  11/4/2022

## 2022-11-08 ENCOUNTER — CARE COORDINATION (OUTPATIENT)
Dept: OTHER | Facility: CLINIC | Age: 77
End: 2022-11-08

## 2022-12-06 DIAGNOSIS — I50.22 CHRONIC SYSTOLIC HF (HEART FAILURE) (HCC): ICD-10-CM

## 2022-12-06 DIAGNOSIS — Z95.810 AICD (AUTOMATIC CARDIOVERTER/DEFIBRILLATOR) PRESENT: Primary | ICD-10-CM

## 2022-12-09 PROCEDURE — 93295 DEV INTERROG REMOTE 1/2/MLT: CPT | Performed by: INTERNAL MEDICINE

## 2022-12-09 PROCEDURE — 93296 REM INTERROG EVL PM/IDS: CPT | Performed by: INTERNAL MEDICINE

## 2022-12-28 ENCOUNTER — NURSE TRIAGE (OUTPATIENT)
Dept: OTHER | Facility: CLINIC | Age: 77
End: 2022-12-28

## 2022-12-28 NOTE — TELEPHONE ENCOUNTER
Location of patient: [place]    Received call from [facility] at [facility] with [employer] Complaint. Subjective: Caller states \"my arms and legs are tired and I have to sit down often\"     Current Symptoms:     Onset: 1 month ago;     Associated Symptoms:     Pain Severity: denies pain    Temperature:  denies fever    What has been tried: Tylenol Arthritis    LMP: NA Pregnant: NA    Recommended disposition: See in Office Within 2 Weeks    Care advice provided, patient verbalizes understanding; denies any other questions or concerns; instructed to call back for any new or worsening symptoms. Patient/Caller agrees with recommended disposition; writer provided warm transfer to [facility] at [facility] for appointment scheduling    Attention Provider: Thank you for allowing me to participate in the care of your patient. The patient was connected to triage in response to information provided to the ECC/PSC. Please do not respond through this encounter as the response is not directed to a shared pool.     Reason for Disposition   Weakness is a chronic symptom (recurrent or ongoing AND present > 4 weeks)    Protocols used: Weakness (Generalized) and Fatigue-ADULT-

## 2023-01-03 ENCOUNTER — APPOINTMENT (OUTPATIENT)
Dept: GENERAL RADIOLOGY | Age: 78
DRG: 812 | End: 2023-01-03
Payer: MEDICARE

## 2023-01-03 ENCOUNTER — CLINICAL DOCUMENTATION (OUTPATIENT)
Dept: FAMILY MEDICINE CLINIC | Facility: CLINIC | Age: 78
End: 2023-01-03

## 2023-01-03 ENCOUNTER — HOSPITAL ENCOUNTER (INPATIENT)
Age: 78
LOS: 3 days | Discharge: HOME | DRG: 812 | End: 2023-01-06
Attending: EMERGENCY MEDICINE | Admitting: FAMILY MEDICINE
Payer: MEDICARE

## 2023-01-03 DIAGNOSIS — E78.00 PURE HYPERCHOLESTEROLEMIA: ICD-10-CM

## 2023-01-03 DIAGNOSIS — Z00.00 LABORATORY EXAMINATION ORDERED AS PART OF A COMPLETE PHYSICAL EXAMINATION: ICD-10-CM

## 2023-01-03 DIAGNOSIS — D64.9 ANEMIA, UNSPECIFIED TYPE: Primary | ICD-10-CM

## 2023-01-03 DIAGNOSIS — R53.1 GENERAL WEAKNESS: ICD-10-CM

## 2023-01-03 DIAGNOSIS — D50.0 IRON DEFICIENCY ANEMIA DUE TO CHRONIC BLOOD LOSS: ICD-10-CM

## 2023-01-03 DIAGNOSIS — I11.0 BENIGN HYPERTENSIVE HEART DISEASE WITH CHF (CONGESTIVE HEART FAILURE) (HCC): ICD-10-CM

## 2023-01-03 DIAGNOSIS — E55.9 VITAMIN D DEFICIENCY: ICD-10-CM

## 2023-01-03 LAB
25(OH)D3 SERPL-MCNC: 37.3 NG/ML (ref 30–100)
ALBUMIN SERPL-MCNC: 3.3 G/DL (ref 3.2–4.6)
ALBUMIN SERPL-MCNC: 3.3 G/DL (ref 3.2–4.6)
ALBUMIN/GLOB SERPL: 0.9 (ref 0.4–1.6)
ALBUMIN/GLOB SERPL: 1 (ref 0.4–1.6)
ALP SERPL-CCNC: 50 U/L (ref 50–136)
ALP SERPL-CCNC: 51 U/L (ref 50–136)
ALT SERPL-CCNC: 13 U/L (ref 12–65)
ALT SERPL-CCNC: 14 U/L (ref 12–65)
ANION GAP SERPL CALC-SCNC: 6 MMOL/L (ref 2–11)
ANION GAP SERPL CALC-SCNC: 7 MMOL/L (ref 2–11)
AST SERPL-CCNC: 11 U/L (ref 15–37)
AST SERPL-CCNC: 12 U/L (ref 15–37)
BASOPHILS # BLD: 0 K/UL (ref 0–0.2)
BASOPHILS # BLD: 0 K/UL (ref 0–0.2)
BASOPHILS NFR BLD: 0 % (ref 0–2)
BASOPHILS NFR BLD: 1 % (ref 0–2)
BILIRUB SERPL-MCNC: 0.6 MG/DL (ref 0.2–1.1)
BILIRUB SERPL-MCNC: 0.6 MG/DL (ref 0.2–1.1)
BUN SERPL-MCNC: 12 MG/DL (ref 8–23)
BUN SERPL-MCNC: 13 MG/DL (ref 8–23)
CALCIUM SERPL-MCNC: 9.2 MG/DL (ref 8.3–10.4)
CALCIUM SERPL-MCNC: 9.4 MG/DL (ref 8.3–10.4)
CHLORIDE SERPL-SCNC: 107 MMOL/L (ref 101–110)
CHLORIDE SERPL-SCNC: 110 MMOL/L (ref 101–110)
CHOLEST SERPL-MCNC: 73 MG/DL
CO2 SERPL-SCNC: 26 MMOL/L (ref 21–32)
CO2 SERPL-SCNC: 27 MMOL/L (ref 21–32)
CREAT SERPL-MCNC: 1 MG/DL (ref 0.6–1)
CREAT SERPL-MCNC: 1 MG/DL (ref 0.6–1)
DIFFERENTIAL METHOD BLD: ABNORMAL
DIFFERENTIAL METHOD BLD: ABNORMAL
EOSINOPHIL # BLD: 0.1 K/UL (ref 0–0.8)
EOSINOPHIL # BLD: 0.1 K/UL (ref 0–0.8)
EOSINOPHIL NFR BLD: 1 % (ref 0.5–7.8)
EOSINOPHIL NFR BLD: 1 % (ref 0.5–7.8)
ERYTHROCYTE [DISTWIDTH] IN BLOOD BY AUTOMATED COUNT: 21.3 % (ref 11.9–14.6)
ERYTHROCYTE [DISTWIDTH] IN BLOOD BY AUTOMATED COUNT: 21.6 % (ref 11.9–14.6)
GLOBULIN SER CALC-MCNC: 3.4 G/DL (ref 2.8–4.5)
GLOBULIN SER CALC-MCNC: 3.8 G/DL (ref 2.8–4.5)
GLUCOSE SERPL-MCNC: 116 MG/DL (ref 65–100)
GLUCOSE SERPL-MCNC: 98 MG/DL (ref 65–100)
HCT VFR BLD AUTO: 16.1 % (ref 35.8–46.3)
HCT VFR BLD AUTO: 17.6 % (ref 35.8–46.3)
HDLC SERPL-MCNC: 35 MG/DL (ref 40–60)
HDLC SERPL: 2.1
HGB BLD-MCNC: 4.3 G/DL (ref 11.7–15.4)
HGB BLD-MCNC: 4.4 G/DL (ref 11.7–15.4)
HISTORY CHECK: NORMAL
IMM GRANULOCYTES # BLD AUTO: 0 K/UL (ref 0–0.5)
IMM GRANULOCYTES # BLD AUTO: 0.1 K/UL (ref 0–0.5)
IMM GRANULOCYTES NFR BLD AUTO: 0 % (ref 0–5)
IMM GRANULOCYTES NFR BLD AUTO: 2 % (ref 0–5)
INR PPP: 1.2
LDLC SERPL CALC-MCNC: 23.4 MG/DL
LYMPHOCYTES # BLD: 1.1 K/UL (ref 0.5–4.6)
LYMPHOCYTES # BLD: 1.4 K/UL (ref 0.5–4.6)
LYMPHOCYTES NFR BLD: 16 % (ref 13–44)
LYMPHOCYTES NFR BLD: 20 % (ref 13–44)
MCH RBC QN AUTO: 17.7 PG (ref 26.1–32.9)
MCH RBC QN AUTO: 18.1 PG (ref 26.1–32.9)
MCHC RBC AUTO-ENTMCNC: 25 G/DL (ref 31.4–35)
MCHC RBC AUTO-ENTMCNC: 26.7 G/DL (ref 31.4–35)
MCV RBC AUTO: 67.9 FL (ref 82–102)
MCV RBC AUTO: 71 FL (ref 82–102)
MONOCYTES # BLD: 0.5 K/UL (ref 0.1–1.3)
MONOCYTES # BLD: 0.7 K/UL (ref 0.1–1.3)
MONOCYTES NFR BLD: 10 % (ref 4–12)
MONOCYTES NFR BLD: 8 % (ref 4–12)
NEUTS SEG # BLD: 4.7 K/UL (ref 1.7–8.2)
NEUTS SEG # BLD: 5 K/UL (ref 1.7–8.2)
NEUTS SEG NFR BLD: 67 % (ref 43–78)
NEUTS SEG NFR BLD: 75 % (ref 43–78)
NRBC # BLD: 0.05 K/UL (ref 0–0.2)
NRBC # BLD: 0.07 K/UL (ref 0–0.2)
NT PRO BNP: 1432 PG/ML
PLATELET # BLD AUTO: 394 K/UL (ref 150–450)
PLATELET # BLD AUTO: 402 K/UL (ref 150–450)
PMV BLD AUTO: 10.4 FL (ref 9.4–12.3)
PMV BLD AUTO: 9.9 FL (ref 9.4–12.3)
POTASSIUM SERPL-SCNC: 3.1 MMOL/L (ref 3.5–5.1)
POTASSIUM SERPL-SCNC: 3.3 MMOL/L (ref 3.5–5.1)
PROT SERPL-MCNC: 6.7 G/DL (ref 6.3–8.2)
PROT SERPL-MCNC: 7.1 G/DL (ref 6.3–8.2)
PROTHROMBIN TIME: 15.6 SEC (ref 12.6–14.3)
RBC # BLD AUTO: 2.37 M/UL (ref 4.05–5.2)
RBC # BLD AUTO: 2.48 M/UL (ref 4.05–5.2)
SODIUM SERPL-SCNC: 141 MMOL/L (ref 133–143)
SODIUM SERPL-SCNC: 142 MMOL/L (ref 133–143)
TRIGL SERPL-MCNC: 73 MG/DL (ref 35–150)
TROPONIN I SERPL HS-MCNC: 16.2 PG/ML (ref 0–14)
TSH, 3RD GENERATION: 2.28 UIU/ML (ref 0.36–3.74)
VLDLC SERPL CALC-MCNC: 14.6 MG/DL (ref 6–23)
WBC # BLD AUTO: 6.7 K/UL (ref 4.3–11.1)
WBC # BLD AUTO: 7 K/UL (ref 4.3–11.1)

## 2023-01-03 PROCEDURE — 80053 COMPREHEN METABOLIC PANEL: CPT

## 2023-01-03 PROCEDURE — A4216 STERILE WATER/SALINE, 10 ML: HCPCS | Performed by: EMERGENCY MEDICINE

## 2023-01-03 PROCEDURE — 99285 EMERGENCY DEPT VISIT HI MDM: CPT

## 2023-01-03 PROCEDURE — 86923 COMPATIBILITY TEST ELECTRIC: CPT

## 2023-01-03 PROCEDURE — 85610 PROTHROMBIN TIME: CPT

## 2023-01-03 PROCEDURE — 71045 X-RAY EXAM CHEST 1 VIEW: CPT

## 2023-01-03 PROCEDURE — 2580000003 HC RX 258: Performed by: EMERGENCY MEDICINE

## 2023-01-03 PROCEDURE — 83880 ASSAY OF NATRIURETIC PEPTIDE: CPT

## 2023-01-03 PROCEDURE — 96374 THER/PROPH/DIAG INJ IV PUSH: CPT

## 2023-01-03 PROCEDURE — 36430 TRANSFUSION BLD/BLD COMPNT: CPT

## 2023-01-03 PROCEDURE — 86850 RBC ANTIBODY SCREEN: CPT

## 2023-01-03 PROCEDURE — 2500000003 HC RX 250 WO HCPCS: Performed by: EMERGENCY MEDICINE

## 2023-01-03 PROCEDURE — P9016 RBC LEUKOCYTES REDUCED: HCPCS

## 2023-01-03 PROCEDURE — 85025 COMPLETE CBC W/AUTO DIFF WBC: CPT

## 2023-01-03 PROCEDURE — 84484 ASSAY OF TROPONIN QUANT: CPT

## 2023-01-03 PROCEDURE — 93005 ELECTROCARDIOGRAM TRACING: CPT

## 2023-01-03 PROCEDURE — 1100000000 HC RM PRIVATE

## 2023-01-03 PROCEDURE — G0378 HOSPITAL OBSERVATION PER HR: HCPCS

## 2023-01-03 RX ORDER — SODIUM CHLORIDE 9 MG/ML
INJECTION, SOLUTION INTRAVENOUS PRN
Status: DISCONTINUED | OUTPATIENT
Start: 2023-01-03 | End: 2023-01-06 | Stop reason: HOSPADM

## 2023-01-03 RX ADMIN — FAMOTIDINE 20 MG: 10 INJECTION INTRAVENOUS at 23:17

## 2023-01-03 ASSESSMENT — ENCOUNTER SYMPTOMS
ABDOMINAL PAIN: 0
COUGH: 0
SHORTNESS OF BREATH: 0
HEMATOCHEZIA: 0
DIARRHEA: 0
NAUSEA: 0

## 2023-01-03 ASSESSMENT — PAIN - FUNCTIONAL ASSESSMENT
PAIN_FUNCTIONAL_ASSESSMENT: 0-10
PAIN_FUNCTIONAL_ASSESSMENT: NONE - DENIES PAIN

## 2023-01-03 NOTE — PROGRESS NOTES
I am the provider on call and received a call from the answering service. They connected me to the lab who had an alert lab value on pt. They notified me that pt's Hgb was 4.4. Pt noted to have hx of DHAVAL and GI bleed in October 2022. I called the pt to check on her. The pt reports that she stopped her oral iron several weeks back. The pt reports having some stools at times that are black and other times are brown. Denies any upper GI bleeding. Pt reports having some generalized fatigue/weakness. Denies any N/V/D/abd pain. Pt's Hgb appears to have dropped from 7.9 in October 2022. I recommended that pt go to ER either [address] [place] [address] or [facility] to have Hgb rechecked. If truly that low, will likely need IV iron vs blood transfusion and will likely need admission to rule out GI bleed. Pt agrees to have a family member take her to the ER at this time. Will have pt's PCP-Courtney Joseph NP call pt in AM to check on her. Christopher Bishop MS, APRN, FNP-C

## 2023-01-04 ENCOUNTER — ANESTHESIA EVENT (OUTPATIENT)
Dept: ENDOSCOPY | Age: 78
End: 2023-01-04
Payer: MEDICARE

## 2023-01-04 ENCOUNTER — ANESTHESIA (OUTPATIENT)
Dept: ENDOSCOPY | Age: 78
End: 2023-01-04
Payer: MEDICARE

## 2023-01-04 LAB
ANION GAP SERPL CALC-SCNC: 7 MMOL/L (ref 2–11)
BASOPHILS # BLD: 0 K/UL (ref 0–0.2)
BASOPHILS NFR BLD: 1 % (ref 0–2)
BUN SERPL-MCNC: 10 MG/DL (ref 8–23)
CALCIUM SERPL-MCNC: 8.4 MG/DL (ref 8.3–10.4)
CHLORIDE SERPL-SCNC: 112 MMOL/L (ref 101–110)
CO2 SERPL-SCNC: 25 MMOL/L (ref 21–32)
CREAT SERPL-MCNC: 0.8 MG/DL (ref 0.6–1)
DIFFERENTIAL METHOD BLD: ABNORMAL
EKG DIAGNOSIS: NORMAL
EKG Q-T INTERVAL: 380 MS
EKG QRS DURATION: 114 MS
EKG QTC CALCULATION (BAZETT): 452 MS
EKG R AXIS: 39 DEGREES
EKG T AXIS: 101 DEGREES
EKG VENTRICULAR RATE: 85 BPM
EOSINOPHIL # BLD: 0.1 K/UL (ref 0–0.8)
EOSINOPHIL NFR BLD: 1 % (ref 0.5–7.8)
ERYTHROCYTE [DISTWIDTH] IN BLOOD BY AUTOMATED COUNT: 21.6 % (ref 11.9–14.6)
FERRITIN SERPL-MCNC: 4 NG/ML (ref 8–388)
GLUCOSE SERPL-MCNC: 103 MG/DL (ref 65–100)
HCT VFR BLD AUTO: 18.4 % (ref 35.8–46.3)
HCT VFR BLD AUTO: 23 % (ref 35.8–46.3)
HCT VFR BLD AUTO: 24.2 % (ref 35.8–46.3)
HCT VFR BLD AUTO: 27.3 % (ref 35.8–46.3)
HGB BLD-MCNC: 4.9 G/DL (ref 11.7–15.4)
HGB BLD-MCNC: 6.7 G/DL (ref 11.7–15.4)
HGB BLD-MCNC: 7.2 G/DL (ref 11.7–15.4)
HGB BLD-MCNC: 7.7 G/DL (ref 11.7–15.4)
HISTORY CHECK: NORMAL
IMM GRANULOCYTES # BLD AUTO: 0 K/UL (ref 0–0.5)
IMM GRANULOCYTES NFR BLD AUTO: 1 % (ref 0–5)
IRON SERPL-MCNC: 13 UG/DL (ref 35–150)
LYMPHOCYTES # BLD: 1 K/UL (ref 0.5–4.6)
LYMPHOCYTES NFR BLD: 17 % (ref 13–44)
MAGNESIUM SERPL-MCNC: 2.1 MG/DL (ref 1.8–2.4)
MCH RBC QN AUTO: 21.7 PG (ref 26.1–32.9)
MCHC RBC AUTO-ENTMCNC: 29.1 G/DL (ref 31.4–35)
MCV RBC AUTO: 74.4 FL (ref 82–102)
MONOCYTES # BLD: 0.6 K/UL (ref 0.1–1.3)
MONOCYTES NFR BLD: 9 % (ref 4–12)
NEUTS SEG # BLD: 4.3 K/UL (ref 1.7–8.2)
NEUTS SEG NFR BLD: 72 % (ref 43–78)
NRBC # BLD: 0.03 K/UL (ref 0–0.2)
PLATELET # BLD AUTO: 283 K/UL (ref 150–450)
PMV BLD AUTO: 9.8 FL (ref 9.4–12.3)
POTASSIUM SERPL-SCNC: 3 MMOL/L (ref 3.5–5.1)
RBC # BLD AUTO: 3.09 M/UL (ref 4.05–5.2)
SODIUM SERPL-SCNC: 144 MMOL/L (ref 133–143)
WBC # BLD AUTO: 6 K/UL (ref 4.3–11.1)

## 2023-01-04 PROCEDURE — C9113 INJ PANTOPRAZOLE SODIUM, VIA: HCPCS | Performed by: HOSPITALIST

## 2023-01-04 PROCEDURE — 6360000002 HC RX W HCPCS: Performed by: FAMILY MEDICINE

## 2023-01-04 PROCEDURE — G0378 HOSPITAL OBSERVATION PER HR: HCPCS

## 2023-01-04 PROCEDURE — 6360000002 HC RX W HCPCS: Performed by: HOSPITALIST

## 2023-01-04 PROCEDURE — 82728 ASSAY OF FERRITIN: CPT

## 2023-01-04 PROCEDURE — 2580000003 HC RX 258: Performed by: NURSE ANESTHETIST, CERTIFIED REGISTERED

## 2023-01-04 PROCEDURE — 6370000000 HC RX 637 (ALT 250 FOR IP): Performed by: HOSPITALIST

## 2023-01-04 PROCEDURE — 36430 TRANSFUSION BLD/BLD COMPNT: CPT

## 2023-01-04 PROCEDURE — 83540 ASSAY OF IRON: CPT

## 2023-01-04 PROCEDURE — 2580000003 HC RX 258: Performed by: HOSPITALIST

## 2023-01-04 PROCEDURE — A4216 STERILE WATER/SALINE, 10 ML: HCPCS | Performed by: FAMILY MEDICINE

## 2023-01-04 PROCEDURE — 7100000011 HC PHASE II RECOVERY - ADDTL 15 MIN: Performed by: INTERNAL MEDICINE

## 2023-01-04 PROCEDURE — P9016 RBC LEUKOCYTES REDUCED: HCPCS

## 2023-01-04 PROCEDURE — 6360000002 HC RX W HCPCS: Performed by: NURSE ANESTHETIST, CERTIFIED REGISTERED

## 2023-01-04 PROCEDURE — 85025 COMPLETE CBC W/AUTO DIFF WBC: CPT

## 2023-01-04 PROCEDURE — 6370000000 HC RX 637 (ALT 250 FOR IP): Performed by: FAMILY MEDICINE

## 2023-01-04 PROCEDURE — 2580000003 HC RX 258: Performed by: FAMILY MEDICINE

## 2023-01-04 PROCEDURE — 30233N1 TRANSFUSION OF NONAUTOLOGOUS RED BLOOD CELLS INTO PERIPHERAL VEIN, PERCUTANEOUS APPROACH: ICD-10-PCS | Performed by: HOSPITALIST

## 2023-01-04 PROCEDURE — 36415 COLL VENOUS BLD VENIPUNCTURE: CPT

## 2023-01-04 PROCEDURE — 0DJ08ZZ INSPECTION OF UPPER INTESTINAL TRACT, VIA NATURAL OR ARTIFICIAL OPENING ENDOSCOPIC: ICD-10-PCS | Performed by: INTERNAL MEDICINE

## 2023-01-04 PROCEDURE — 7100000010 HC PHASE II RECOVERY - FIRST 15 MIN: Performed by: INTERNAL MEDICINE

## 2023-01-04 PROCEDURE — C9113 INJ PANTOPRAZOLE SODIUM, VIA: HCPCS | Performed by: FAMILY MEDICINE

## 2023-01-04 PROCEDURE — 80048 BASIC METABOLIC PNL TOTAL CA: CPT

## 2023-01-04 PROCEDURE — 1100000000 HC RM PRIVATE

## 2023-01-04 PROCEDURE — 85018 HEMOGLOBIN: CPT

## 2023-01-04 PROCEDURE — 3700000000 HC ANESTHESIA ATTENDED CARE: Performed by: INTERNAL MEDICINE

## 2023-01-04 PROCEDURE — 3609017100 HC EGD: Performed by: INTERNAL MEDICINE

## 2023-01-04 PROCEDURE — 96375 TX/PRO/DX INJ NEW DRUG ADDON: CPT

## 2023-01-04 PROCEDURE — 2709999900 HC NON-CHARGEABLE SUPPLY: Performed by: INTERNAL MEDICINE

## 2023-01-04 PROCEDURE — 83735 ASSAY OF MAGNESIUM: CPT

## 2023-01-04 RX ORDER — SODIUM CHLORIDE 0.9 % (FLUSH) 0.9 %
5-40 SYRINGE (ML) INJECTION PRN
Status: DISCONTINUED | OUTPATIENT
Start: 2023-01-04 | End: 2023-01-06 | Stop reason: HOSPADM

## 2023-01-04 RX ORDER — ACETAMINOPHEN 325 MG/1
650 TABLET ORAL EVERY 6 HOURS PRN
Status: DISCONTINUED | OUTPATIENT
Start: 2023-01-04 | End: 2023-01-06 | Stop reason: HOSPADM

## 2023-01-04 RX ORDER — ONDANSETRON 4 MG/1
4 TABLET, ORALLY DISINTEGRATING ORAL EVERY 8 HOURS PRN
Status: DISCONTINUED | OUTPATIENT
Start: 2023-01-04 | End: 2023-01-06 | Stop reason: HOSPADM

## 2023-01-04 RX ORDER — AMLODIPINE BESYLATE 5 MG/1
5 TABLET ORAL DAILY
Status: DISCONTINUED | OUTPATIENT
Start: 2023-01-04 | End: 2023-01-06 | Stop reason: HOSPADM

## 2023-01-04 RX ORDER — SODIUM CHLORIDE, SODIUM LACTATE, POTASSIUM CHLORIDE, CALCIUM CHLORIDE 600; 310; 30; 20 MG/100ML; MG/100ML; MG/100ML; MG/100ML
INJECTION, SOLUTION INTRAVENOUS CONTINUOUS PRN
Status: DISCONTINUED | OUTPATIENT
Start: 2023-01-04 | End: 2023-01-04 | Stop reason: SDUPTHER

## 2023-01-04 RX ORDER — ACETAMINOPHEN 650 MG/1
650 SUPPOSITORY RECTAL EVERY 6 HOURS PRN
Status: DISCONTINUED | OUTPATIENT
Start: 2023-01-04 | End: 2023-01-06 | Stop reason: HOSPADM

## 2023-01-04 RX ORDER — LISINOPRIL 20 MG/1
40 TABLET ORAL DAILY
Status: DISCONTINUED | OUTPATIENT
Start: 2023-01-04 | End: 2023-01-06 | Stop reason: HOSPADM

## 2023-01-04 RX ORDER — POTASSIUM CHLORIDE 20 MEQ/1
40 TABLET, EXTENDED RELEASE ORAL 2 TIMES DAILY WITH MEALS
Status: COMPLETED | OUTPATIENT
Start: 2023-01-04 | End: 2023-01-04

## 2023-01-04 RX ORDER — POLYETHYLENE GLYCOL 3350 17 G/17G
17 POWDER, FOR SOLUTION ORAL DAILY PRN
Status: DISCONTINUED | OUTPATIENT
Start: 2023-01-04 | End: 2023-01-06 | Stop reason: HOSPADM

## 2023-01-04 RX ORDER — SODIUM CHLORIDE 9 MG/ML
INJECTION, SOLUTION INTRAVENOUS PRN
Status: DISCONTINUED | OUTPATIENT
Start: 2023-01-04 | End: 2023-01-06

## 2023-01-04 RX ORDER — SODIUM CHLORIDE 9 MG/ML
INJECTION, SOLUTION INTRAVENOUS CONTINUOUS
Status: DISCONTINUED | OUTPATIENT
Start: 2023-01-04 | End: 2023-01-04

## 2023-01-04 RX ORDER — SODIUM CHLORIDE 0.9 % (FLUSH) 0.9 %
5-40 SYRINGE (ML) INJECTION EVERY 12 HOURS SCHEDULED
Status: DISCONTINUED | OUTPATIENT
Start: 2023-01-04 | End: 2023-01-06 | Stop reason: HOSPADM

## 2023-01-04 RX ORDER — PROPOFOL 10 MG/ML
INJECTION, EMULSION INTRAVENOUS PRN
Status: DISCONTINUED | OUTPATIENT
Start: 2023-01-04 | End: 2023-01-04 | Stop reason: SDUPTHER

## 2023-01-04 RX ORDER — CARVEDILOL 25 MG/1
25 TABLET ORAL 2 TIMES DAILY WITH MEALS
Status: DISCONTINUED | OUTPATIENT
Start: 2023-01-04 | End: 2023-01-06 | Stop reason: HOSPADM

## 2023-01-04 RX ORDER — ONDANSETRON 2 MG/ML
4 INJECTION INTRAMUSCULAR; INTRAVENOUS EVERY 6 HOURS PRN
Status: DISCONTINUED | OUTPATIENT
Start: 2023-01-04 | End: 2023-01-06 | Stop reason: HOSPADM

## 2023-01-04 RX ORDER — ROSUVASTATIN CALCIUM 20 MG/1
20 TABLET, COATED ORAL DAILY
Status: DISCONTINUED | OUTPATIENT
Start: 2023-01-04 | End: 2023-01-06 | Stop reason: HOSPADM

## 2023-01-04 RX ORDER — SODIUM CHLORIDE 9 MG/ML
INJECTION, SOLUTION INTRAVENOUS PRN
Status: DISCONTINUED | OUTPATIENT
Start: 2023-01-04 | End: 2023-01-06 | Stop reason: HOSPADM

## 2023-01-04 RX ADMIN — SODIUM CHLORIDE, PRESERVATIVE FREE 10 ML: 5 INJECTION INTRAVENOUS at 20:48

## 2023-01-04 RX ADMIN — POTASSIUM CHLORIDE 40 MEQ: 1500 TABLET, EXTENDED RELEASE ORAL at 17:12

## 2023-01-04 RX ADMIN — POLYETHYLENE GLYCOL 3350 17 G: 17 POWDER, FOR SOLUTION ORAL at 17:12

## 2023-01-04 RX ADMIN — POTASSIUM CHLORIDE 40 MEQ: 1500 TABLET, EXTENDED RELEASE ORAL at 10:13

## 2023-01-04 RX ADMIN — SODIUM CHLORIDE, SODIUM LACTATE, POTASSIUM CHLORIDE, AND CALCIUM CHLORIDE: 600; 310; 30; 20 INJECTION, SOLUTION INTRAVENOUS at 16:09

## 2023-01-04 RX ADMIN — PROPOFOL 10 MG: 10 INJECTION, EMULSION INTRAVENOUS at 16:17

## 2023-01-04 RX ADMIN — SODIUM CHLORIDE 40 MG: 9 INJECTION, SOLUTION INTRAMUSCULAR; INTRAVENOUS; SUBCUTANEOUS at 10:13

## 2023-01-04 RX ADMIN — SODIUM CHLORIDE: 9 INJECTION, SOLUTION INTRAVENOUS at 04:49

## 2023-01-04 RX ADMIN — PROPOFOL 20 MG: 10 INJECTION, EMULSION INTRAVENOUS at 16:16

## 2023-01-04 RX ADMIN — ROSUVASTATIN CALCIUM 20 MG: 20 TABLET, COATED ORAL at 10:13

## 2023-01-04 RX ADMIN — PROPOFOL 50 MG: 10 INJECTION, EMULSION INTRAVENOUS at 16:15

## 2023-01-04 RX ADMIN — SODIUM CHLORIDE, PRESERVATIVE FREE 40 MG: 5 INJECTION INTRAVENOUS at 20:47

## 2023-01-04 ASSESSMENT — PAIN - FUNCTIONAL ASSESSMENT: PAIN_FUNCTIONAL_ASSESSMENT: NONE - DENIES PAIN

## 2023-01-04 NOTE — ANESTHESIA POSTPROCEDURE EVALUATION
Department of Anesthesiology  Postprocedure Note    Patient: Rochelle Morton  MRN: [id number]  YOB: 1945  Date of evaluation: 1/4/2023      Procedure Summary     Date: 01/04/23 Room / Location:  ENDO 04 /  ENDOSCOPY    Anesthesia Start: 4089 Anesthesia Stop: 1622    Procedure: EGD ESOPHAGOGASTRODUODENOSCOPY ER 3/ assigned 5th floor *HGB recheck (Upper GI Region) Diagnosis:       Gastrointestinal hemorrhage, unspecified gastrointestinal hemorrhage type      (Gastrointestinal hemorrhage, unspecified gastrointestinal hemorrhage type [K92.2])    Surgeons: Kacey Gomez MD Responsible Provider: Mitul Delcid MD    Anesthesia Type: TIVA ASA Status: 4          Anesthesia Type: TIVA    Sammy Phase I: Sammy Score: 10    Sammy Phase II: Sammy Score: 9      Anesthesia Post Evaluation    Patient location during evaluation: PACU  Patient participation: complete - patient participated  Level of consciousness: awake and alert  Airway patency: patent  Nausea & Vomiting: no nausea and no vomiting  Complications: no  Cardiovascular status: hemodynamically stable  Respiratory status: acceptable  Hydration status: euvolemic  Comments: Blood pressure (!) 112/58, pulse 90, temperature 98.8 °F (37.1 °C), temperature source Oral, resp. rate 20, height 5' 5\" (1.651 m), weight 150 lb (68 kg), SpO2 98 %.       Pt stable for discharge from PACU  Multimodal analgesia pain management approach

## 2023-01-04 NOTE — ED NOTES
Pt has critical HGB of 4.9. MD aware.  2nd blood unit has been started     Mike Crain RN  01/04/23 0278

## 2023-01-04 NOTE — ED TRIAGE NOTES
Chief Complaint: Abnormal lab     HPI: [ 78year old female history of CAD s/p CABG, Chronic HFrEF, AICD, s/p MVR, emphysema, duodenitis presents for abnormal lab value. She states that she was contacted by her provider and her hemoglobin was 4.4. She states that she is just generally felt weak. She did have an EGD in October which showed duodenitis. She reports she was prescribed iron replacement therapy but she stopped taking this medication about a month ago. She denies dark or tarry stools. States that after stopping the iron her stools have transitioned to a brown color. Vital Signs   Patient Vitals for the past 4 hrs:   Temp Pulse Resp BP SpO2   23 2045 97.9 °F (36.6 °C) 89 22 98/85 98 %        Past Medical History:   Diagnosis Date    Acute mitral regurgitation     AICD (automatic cardioverter/defibrillator) present 2015    Anterior myocardial infarction [facility])     Arrhythmia     Benign hypertensive heart disease with CHF (congestive heart failure) ([facility] [address] 2015    CAD (coronary artery disease)     [id number]; PC 1999I; Ischemic CM EF 25%    Carotid artery stenosis without cerebral infarction 2015    Chronic ischemic heart disease 2015    Chronic systolic HF (heart failure) ([facility] [address] 3/17/2016     - EF 30-35%.  2010 - Single leadICD - Biotronik 2012:  EF 45-50% 2013:  EF 40-45% 2013:  EF 45% 2014:  EF 45% 2015:  EF 40-45%    Coronary atherosclerosis of native coronary artery     Dyspnea on exertion     GERD (gastroesophageal reflux disease)     GI bleed 2009    Hyperlipidemia     Hypertension     Influenza A (H5N1) 2019    Menopause     52's    OA (osteoarthritis)     PUD (peptic ulcer disease)     hx ulcer  dx 2009    S/P mitral valve repair 3/17/2016    Tobacco use disorder         Past Surgical History:   Procedure Laterality Date    [address]    PCI  x 1 - pt does not have stent card     SECTION      x2    CORONARY ARTERY BYPASS GRAFT      MITRALPLASTY W CP BYPASS      with repair device    PACEMAKER      TUBAL LIGATION      UPPER GASTROINTESTINAL ENDOSCOPY N/A 10/27/2022    EGD BIOPSY performed by Jonas Zimmerman MD at [facility] ENDOSCOPY        Family History   Problem Relation Age of Onset    Heart Surgery Brother     Heart Disease Sister     Heart Disease Father     Heart Attack Father     Breast Cancer Neg Hx     Coronary Art Dis Other     No Known Problems Mother     Heart Disease Brother         Social History     Socioeconomic History    Marital status: Single     Spouse name: None    Number of children: None    Years of education: None    Highest education level: None   Tobacco Use    Smoking status: Former     Packs/day: 0.25     Types: Cigarettes     Start date: 1975     Quit date: 2009     Years since quittin.5    Smokeless tobacco: Never    Tobacco comments:     Quit smokin09   Substance and Sexual Activity    Alcohol use: No    Drug use: Yes     Types: Prescription   Social History Narrative    She lives in [place]        Allergies: Influenza virus vaccine    Previous Medications    AMLODIPINE (NORVASC) 5 MG TABLET    TAKE 1 TABLET BY MOUTH DAILY    ASPIRIN 81 MG EC TABLET    Take 81 mg by mouth daily    CARVEDILOL (COREG) 25 MG TABLET    TAKE 1 TABLET BY MOUTH TWICE DAILY WITH MEALS    FERROUS SULFATE (FE TABS 325) 325 (65 FE) MG EC TABLET    Take 325 mg by mouth 2 times daily    LISINOPRIL (PRINIVIL;ZESTRIL) 40 MG TABLET    Take 1 tablet by mouth daily    PANTOPRAZOLE (PROTONIX) 40 MG TABLET    Take 1 tablet by mouth 2 times daily (before meals)    ROSUVASTATIN (CRESTOR) 20 MG TABLET    TAKE 1 TABLET BY MOUTH DAILY    VITAMIN D3 (CHOLECALCIFEROL) 125 MCG (5000 UT) TABS TABLET    Take 5,000 Units by mouth daily          Brief PE:  GEN: No distress.   CV: As per triage vitals  PULM: Breathing comfortably  ABD: No distention  NEURO: Awake, Alert     Impression:     Plan:    Patient evaluated initially in triage. Rapid Medical Evaluation was conducted and necessary orders have been placed. I have performed a medical screening exam.  Care will now be transferred to the provider in the back of the emergency department.   TIANNA Anders CNP 8:49 PM

## 2023-01-04 NOTE — PROGRESS NOTES
TRANSFER - OUT REPORT:    Verbal report given to JALYN on Serenity Olivas  being transferred to [address] [place] [address] for routine post-op       Report consisted of patient's Situation, Background, Assessment and   Recommendations(SBAR). Information from the following report(s) Surgery Report was reviewed with the receiving nurse. Glen Assessment: No data recorded  Lines:   Peripheral IV 01/03/23 Right Forearm (Active)   Site Assessment Clean, dry & intact 01/04/23 1305   Line Status Blood return noted; Flushed; Infusing 01/04/23 1305   Phlebitis Assessment No symptoms 01/03/23 2106   Infiltration Assessment 0 01/04/23 1305   Alcohol Cap Used No 01/03/23 2106   Dressing Status Clean, dry & intact 01/04/23 1305   Dressing Type Transparent 01/03/23 2106   Dressing Intervention New 01/03/23 2106       Peripheral IV 01/04/23 Left Antecubital (Active)        Opportunity for questions and clarification was provided.       Patient transported with:  [employer] electronic

## 2023-01-04 NOTE — ASSESSMENT & PLAN NOTE
- H/O GIB a few months ago  - No antiplatelets/anticoagulants  - Transfuse to keep hgb >8  - Consult to GI  - IVFs to maintain BP given pre-syncope  - IV protonix

## 2023-01-04 NOTE — OP NOTE
Esophagogastroduodenoscopy    DATE of PROCEDURE: 1/4/2023    INDICATION: Anemia, Hx GI Bleed    POSTPROCEDURE DIAGNOSIS: esophagitis    MEDICATIONS ADMINISTERED: MAC anesthesia (see anesthesia report)    INSTRUMENT: GIF-[id number]    PROCEDURE:  After obtaining informed consent, the patient was placed in the left lateral position and sedated. The endoscope was advanced under direct vision without difficulty. The esophagus, stomach (including retroflexed views) and duodenum were evaluated. The patient was taken to the recovery area in stable condition.     FINDINGS:  ESOPHAGUS: normal proximally, distal LA class A esophagitis  STOMACH:Normal  DUODENUM: Scarred duodenal bulb    NO STIGMATA OF RECENT GI BLEEDING    Estimated blood loss: 0-minimal   Specimens obtained during procedure: none    PLAN: Serial hgb, Consider capsule endoscopy vs Colonoscopy

## 2023-01-04 NOTE — CONSENT
Informed Consent for Blood Component Transfusion Note    I have discussed with the patient the rationale for blood component transfusion; its benefits in treating or preventing fatigue, organ damage, or death; and its risk which includes mild transfusion reactions, rare risk of blood borne infection, or more serious but rare reactions. I have discussed the alternatives to transfusion, including the risk and consequences of not receiving transfusion. The patient had an opportunity to ask questions and had agreed to proceed with transfusion of blood components.     Electronically signed by Valentine Reed MD on 1/3/23 at 9:12 PM EST

## 2023-01-04 NOTE — ASSESSMENT & PLAN NOTE
- Hgb 4.3 here  - Transfusion ordered  - Will trend hgb and continue to transfuse as needed to keep hgb >8, given cardiac history  - Given h/o prior GIB, consult with GI

## 2023-01-04 NOTE — ASSESSMENT & PLAN NOTE
- Holding BP meds at this time due to possibility of active GIB with borderline pressures  - Can restart as appropriate

## 2023-01-04 NOTE — ED PROVIDER NOTES
Emergency Department Provider Note                   PCP:                Kristy Pham, APRN - [place]               Age: 78 y.o. Sex: female       ICD-10-CM    1. Anemia, unspecified type  D64.9       2. General weakness  R53.1           DISPOSITION Decision To Admit 01/03/2023 10:49:45 PM        Medical Decision Making  Patient is a 43-year-old female with a history of CAD status post CABG, AICD s/p MVR, emphysema, GI bleed last being admitted 10/25/2022 through 10/28/2022 for syncope and was found to have an upper GI bleed status post EGD which showed duodenitis. 1 1/2 months of JOHNSON and weakness. Outpatient labs today: Hgb 4.4. Heme neg. Hgb 4.3     98/85, 97.9, 89, 22, 98% RA    Patient is mildly hypotensive however patient is awake alert and interactive. Patient's hemoglobin shows significant anemia of 4.3. We will type and cross and transfuse initially 1 unit. Patient typed and crossed and 1 unit PRBCs ordered    Amount and/or Complexity of Data Reviewed  Labs: ordered. Decision-making details documented in ED Course. Radiology: ordered and independent interpretation performed. Decision-making details documented in ED Course. ECG/medicine tests: ordered and independent interpretation performed. Decision-making details documented in ED Course. Discussion of management or test interpretation with external provider(s): I have discussed case with the hospitalist and they have agreed to admit. Risk  Decision regarding hospitalization. Complexity of Problem: 1 acute uncomplicated illness requiring admission. (3)  The patients assessment required an independent historian: I spoke with a family member. I have conducted an independent ordering and review of Labs. I have conducted an independent ordering and review of EKG. I have conducted an independent ordering and review of X-rays.   I have reviewed records from an external source: provider visit notes from outside specialist.  I have reviewed records from an external source: previous old EKG's reviewed. Considerations: Shared decision making was utilized in the care of this patient. I discussed disposition with another provider. Patient was admitted I have communication with admitting physician. ED Course as of 01/03/23 2254 Tue Jan 03, 2023 2121 EKG interpretation: Sinus rhythm rate of 85, right bundle branch block, no STEMI, nonspecific T wave changes, no significant change from EKG from October 2022. [SH]      ED Course User Index  [SH] Elfego Carver MD        Orders Placed This Encounter   Procedures    XR CHEST PORTABLE    CBC with Auto Differential    CMP    Protime-INR    Hemoglobin and Hematocrit    Brain Natriuretic Peptide    Troponin    Pulse Oximetry    Verify hospital blood product consent form has been signed and witnessed    Vital Signs For Blood Product Transfusion    Transfusion Reaction Management    EKG 12 Lead (Select if Upper Abdominal Pain or symptoms of SOB,or Tachycardia)    EKG 12 Lead    TYPE AND SCREEN    PREPARE RBC (CROSSMATCH), 2 Units    Saline lock IV        Medications   0.9 % sodium chloride infusion (has no administration in time range)   famotidine (PEPCID) 20 mg in sodium chloride (PF) 0.9 % 10 mL injection (has no administration in time range)       New Prescriptions    No medications on file        Sally Desouza is a 78 y.o. female who presents to the Emergency Department with chief complaint of    Chief Complaint   Patient presents with    Abnormal Lab      Patient is a 61-year-old female with a history of CAD status post CABG, AICD s/p MVR, emphysema, GI bleed last being admitted 10/25/2022 through 10/28/2022 for syncope and was found to have an upper GI bleed status post EGD which showed duodenitis. Patient states for the last month and a half she has had dyspnea on exertion and weakness near syncopal episodes. Patient denies any associated chest palpitations.   Patient denies any trauma and denies a headache. Patient was at her doctor's office today and had blood work drawn and called with a critical value with a hemoglobin of 4.4. Patient denies any melena and states she is has brown stools. Fatigue  Severity:  Moderate  Onset quality:  Gradual  Duration:  6 weeks  Timing:  Intermittent  Progression:  Waxing and waning  Chronicity:  Recurrent  Context: not alcohol use, not decreased sleep, not drug use, not increased activity, not recent infection and not stress    Relieved by:  Rest  Worsened by:  Standing and activity  Ineffective treatments:  None tried  Associated symptoms: difficulty walking and lethargy    Associated symptoms: no abdominal pain, no anorexia, no aphasia, no arthralgias, no chest pain, no cough, no diarrhea, no dysuria, no numbness in extremities, no falls, no fever, no foul-smelling urine, no headaches, no hematochezia, no myalgias, no nausea, no near-syncope, no seizures, no sensory-motor deficit, no shortness of breath, no stroke symptoms and no syncope    Risk factors: anemia, congestive heart failure and coronary artery disease        Review of Systems   Constitutional:  Positive for fatigue. Negative for fever. Respiratory:  Negative for cough and shortness of breath. Cardiovascular:  Negative for chest pain, syncope and near-syncope. Gastrointestinal:  Negative for abdominal pain, anorexia, diarrhea, hematochezia and nausea. Genitourinary:  Negative for dysuria. Musculoskeletal:  Negative for arthralgias, falls and myalgias. Neurological:  Negative for seizures and headaches. All other systems reviewed and are negative.     Past Medical History:   Diagnosis Date    Acute mitral regurgitation     AICD (automatic cardioverter/defibrillator) present 12/23/2015    Anterior myocardial infarction [facility])     Arrhythmia     Benign hypertensive heart disease with CHF (congestive heart failure) ([facility] [address] 12/23/2015    CAD (coronary artery disease) [id number]; PC I; Ischemic CM EF 25%    Carotid artery stenosis without cerebral infarction 2015    Chronic ischemic heart disease 2015    Chronic systolic HF (heart failure) ([facility] [address] 3/17/2016     - EF 30-35%.  2010 - Single leadICD - [employer] 2012:  EF 45-50% 2013:  EF 40-45% 2013:  EF 45% 2014:  EF 45% 2015:  EF 40-45%    Coronary atherosclerosis of native coronary artery     Dyspnea on exertion     GERD (gastroesophageal reflux disease)     GI bleed 2009    Hyperlipidemia     Hypertension     Influenza A (H5N1) 2019    Menopause     54's    OA (osteoarthritis)     PUD (peptic ulcer disease)     hx ulcer  dx 2009    S/P mitral valve repair 3/17/2016    Tobacco use disorder         Past Surgical History:   Procedure Laterality Date    [employer]    PCI  x 1 - pt does not have stent card     SECTION      x2    CORONARY ARTERY BYPASS GRAFT      MITRALPLASTY W CP BYPASS      with repair device    PACEMAKER      TUBAL LIGATION      UPPER GASTROINTESTINAL ENDOSCOPY N/A 10/27/2022    EGD BIOPSY performed by Marco A Chapman MD at [facility] ENDOSCOPY        Family History   Problem Relation Age of Onset    Heart Surgery Brother     Heart Disease Sister     Heart Disease Father     Heart Attack Father     Breast Cancer Neg Hx     Coronary Art Dis Other     No Known Problems Mother     Heart Disease Brother         Social History     Socioeconomic History    Marital status: Single     Spouse name: None    Number of children: None    Years of education: None    Highest education level: None   Tobacco Use    Smoking status: Former     Packs/day: 0.25     Types: Cigarettes     Start date: 1975     Quit date: 2009     Years since quittin.5    Smokeless tobacco: Never    Tobacco comments:     Quit smokin09   Substance and Sexual Activity    Alcohol use: No    Drug use: Yes     Types: Prescription   Social History Narrative    She lives in [place]         Influenza virus vaccine     Previous Medications    AMLODIPINE (NORVASC) 5 MG TABLET    TAKE 1 TABLET BY MOUTH DAILY    ASPIRIN 81 MG EC TABLET    Take 81 mg by mouth daily    CARVEDILOL (COREG) 25 MG TABLET    TAKE 1 TABLET BY MOUTH TWICE DAILY WITH MEALS    FERROUS SULFATE (FE TABS 325) 325 (65 FE) MG EC TABLET    Take 325 mg by mouth 2 times daily    LISINOPRIL (PRINIVIL;ZESTRIL) 40 MG TABLET    Take 1 tablet by mouth daily    PANTOPRAZOLE (PROTONIX) 40 MG TABLET    Take 1 tablet by mouth 2 times daily (before meals)    ROSUVASTATIN (CRESTOR) 20 MG TABLET    TAKE 1 TABLET BY MOUTH DAILY    VITAMIN D3 (CHOLECALCIFEROL) 125 MCG (5000 UT) TABS TABLET    Take 5,000 Units by mouth daily        Vitals signs and nursing note reviewed. Patient Vitals for the past 4 hrs:   Temp Pulse Resp BP SpO2   01/03/23 2045 97.9 °F (36.6 °C) 89 22 98/85 98 %          Physical Exam  Vitals and nursing note reviewed. Constitutional:       Appearance: Normal appearance. HENT:      Head: Normocephalic and atraumatic. Nose: Nose normal.      Mouth/Throat:      Mouth: Mucous membranes are moist.      Pharynx: Oropharynx is clear. Eyes:      Extraocular Movements: Extraocular movements intact. Conjunctiva/sclera: Conjunctivae normal.      Pupils: Pupils are equal, round, and reactive to light. Cardiovascular:      Rate and Rhythm: Normal rate. Pulses: Normal pulses. Pulmonary:      Effort: Pulmonary effort is normal.   Abdominal:      General: Abdomen is flat. Bowel sounds are normal.      Palpations: Abdomen is soft. Musculoskeletal:         General: Normal range of motion. Cervical back: Normal range of motion and neck supple. Skin:     General: Skin is warm. Capillary Refill: Capillary refill takes less than 2 seconds. Neurological:      General: No focal deficit present. Mental Status: She is alert and oriented to person, place, and time. Mental status is at baseline. Psychiatric:         Mood and Affect: Mood normal.         Behavior: Behavior normal.         Thought Content: Thought content normal.         Judgment: Judgment normal.        Procedures    Results for orders placed or performed during the hospital encounter of 01/03/23   XR CHEST PORTABLE    Narrative    EXAM: Chest x-ray. INDICATION: Dyspnea. COMPARISON: Prior chest x-ray on October 25, 2022. TECHNIQUE: Frontal view chest x-ray. FINDINGS: The heart is upper normal in size. Again noted is a prior sternotomy,  valvuloplasty and left chest wall defibrillator. The lungs are clear. No  pneumothorax, vascular congestion or pleural effusion is seen. The bones are  intact. Impression    No acute process.    CBC with Auto Differential   Result Value Ref Range    WBC 7.0 4.3 - 11.1 K/uL    RBC 2.37 (L) 4.05 - 5.2 M/uL    Hemoglobin 4.3 (LL) 11.7 - 15.4 g/dL    Hematocrit 16.1 (L) 35.8 - 46.3 %    MCV 67.9 (L) 82 - 102 FL    MCH 18.1 (L) 26.1 - 32.9 PG    MCHC 26.7 (L) 31.4 - 35.0 g/dL    RDW 21.3 (H) 11.9 - 14.6 %    Platelets 953 342 - 917 K/uL    MPV 9.9 9.4 - 12.3 FL    nRBC 0.05 0.0 - 0.2 K/uL    Differential Type AUTOMATED      Seg Neutrophils 67 43 - 78 %    Lymphocytes 20 13 - 44 %    Monocytes 10 4.0 - 12.0 %    Eosinophils % 1 0.5 - 7.8 %    Basophils 0 0.0 - 2.0 %    Immature Granulocytes 2 0.0 - 5.0 %    Segs Absolute 4.7 1.7 - 8.2 K/UL    Absolute Lymph # 1.4 0.5 - 4.6 K/UL    Absolute Mono # 0.7 0.1 - 1.3 K/UL    Absolute Eos # 0.1 0.0 - 0.8 K/UL    Basophils Absolute 0.0 0.0 - 0.2 K/UL    Absolute Immature Granulocyte 0.1 0.0 - 0.5 K/UL   CMP   Result Value Ref Range    Sodium 141 133 - 143 mmol/L    Potassium 3.1 (L) 3.5 - 5.1 mmol/L    Chloride 107 101 - 110 mmol/L    CO2 27 21 - 32 mmol/L    Anion Gap 7 2 - 11 mmol/L    Glucose 116 (H) 65 - 100 mg/dL    BUN 13 8 - 23 MG/DL    Creatinine 1.00 0.6 - 1.0 MG/DL    Est, Glom Filt Rate 59 (L) >60 ml/min/1.73m2    Calcium 9.4 8.3 - 10.4 MG/DL Total Bilirubin 0.6 0.2 - 1.1 MG/DL    ALT 14 12 - 65 U/L    AST 11 (L) 15 - 37 U/L    Alk Phosphatase 51 50 - 136 U/L    Total Protein 7.1 6.3 - 8.2 g/dL    Albumin 3.3 3.2 - 4.6 g/dL    Globulin 3.8 2.8 - 4.5 g/dL    Albumin/Globulin Ratio 0.9 0.4 - 1.6     Protime-INR   Result Value Ref Range    Protime 15.6 (H) 12.6 - 14.3 sec    INR 1.2     Brain Natriuretic Peptide   Result Value Ref Range    NT Pro-BNP 1,432 (H) <450 PG/ML   Troponin   Result Value Ref Range    Troponin, High Sensitivity 16.2 (H) 0 - 14 pg/mL   EKG 12 Lead (Select if Upper Abdominal Pain or symptoms of SOB,or Tachycardia)   Result Value Ref Range    Ventricular Rate 85 BPM    QRS Duration 114 ms    Q-T Interval 380 ms    QTc Calculation (Bazett) 452 ms    R Axis 39 degrees    T Axis 101 degrees    Diagnosis Accelerated Junctional rhythm    TYPE AND SCREEN   Result Value Ref Range    Crossmatch expiration date 01/06/2023,2359     ABO/Rh O POSITIVE     Antibody Screen NEG     Blood Bank Comment CALLED ER AT 2248 BLOOD READY 1.3.23 EOR     Unit Number [id number]     Product Code Blood Bank  LR     Unit Divison 00     Dispense Status Blood Bank ALLOCATED     Crossmatch Result Compatible     Unit Number [id number]     Product Code Blood Bank  LR     Unit Divison 00     Dispense Status Blood Bank ALLOCATED     Crossmatch Result Compatible    PREPARE RBC (CROSSMATCH), 2 Units   Result Value Ref Range    History Check Historical check performed    Results for orders placed or performed in visit on 01/03/23   TSH   Result Value Ref Range    TSH, 3RD GENERATION 2.280 0.358 - 3.740 uIU/mL   Vitamin D 25 Hydroxy   Result Value Ref Range    Vit D, 25-Hydroxy 37.3 30.0 - 100.0 ng/mL   Lipid Panel   Result Value Ref Range    Cholesterol, Total 73 <200 MG/DL    Triglycerides 73 35 - 150 MG/DL    HDL 35 (L) 40 - 60 MG/DL    LDL Calculated 23.4 <100 MG/DL    VLDL Cholesterol Calculated 14.6 6.0 - 23.0 MG/DL    Chol/HDL Ratio 2.1     Comprehensive Metabolic Panel   Result Value Ref Range    Sodium 142 133 - 143 mmol/L    Potassium 3.3 (L) 3.5 - 5.1 mmol/L    Chloride 110 101 - 110 mmol/L    CO2 26 21 - 32 mmol/L    Anion Gap 6 2 - 11 mmol/L    Glucose 98 65 - 100 mg/dL    BUN 12 8 - 23 MG/DL    Creatinine 1.00 0.6 - 1.0 MG/DL    Est, Glom Filt Rate 59 (L) >60 ml/min/1.73m2    Calcium 9.2 8.3 - 10.4 MG/DL    Total Bilirubin 0.6 0.2 - 1.1 MG/DL    ALT 13 12 - 65 U/L    AST 12 (L) 15 - 37 U/L    Alk Phosphatase 50 50 - 136 U/L    Total Protein 6.7 6.3 - 8.2 g/dL    Albumin 3.3 3.2 - 4.6 g/dL    Globulin 3.4 2.8 - 4.5 g/dL    Albumin/Globulin Ratio 1.0 0.4 - 1.6     CBC with Auto Differential   Result Value Ref Range    WBC 6.7 4.3 - 11.1 K/uL    RBC 2.48 (L) 4.05 - 5.2 M/uL    Hemoglobin 4.4 (LL) 11.7 - 15.4 g/dL    Hematocrit 17.6 (L) 35.8 - 46.3 %    MCV 71.0 (L) 82 - 102 FL    MCH 17.7 (L) 26.1 - 32.9 PG    MCHC 25.0 (L) 31.4 - 35.0 g/dL    RDW 21.6 (H) 11.9 - 14.6 %    Platelets 542 477 - 450 K/uL    MPV 10.4 9.4 - 12.3 FL    nRBC 0.07 0.0 - 0.2 K/uL    Differential Type AUTOMATED      Seg Neutrophils 75 43 - 78 %    Lymphocytes 16 13 - 44 %    Monocytes 8 4.0 - 12.0 %    Eosinophils % 1 0.5 - 7.8 %    Basophils 1 0.0 - 2.0 %    Immature Granulocytes 0 0.0 - 5.0 %    Segs Absolute 5.0 1.7 - 8.2 K/UL    Absolute Lymph # 1.1 0.5 - 4.6 K/UL    Absolute Mono # 0.5 0.1 - 1.3 K/UL    Absolute Eos # 0.1 0.0 - 0.8 K/UL    Basophils Absolute 0.0 0.0 - 0.2 K/UL    Absolute Immature Granulocyte 0.0 0.0 - 0.5 K/UL        XR CHEST PORTABLE   Final Result   No acute process. Voice dictation software was used during the making of this note. This software is not perfect and grammatical and other typographical errors may be present. This note has not been completely proofread for errors.       Boni [place] MD  01/03/23 0035

## 2023-01-04 NOTE — H&P
Hospitalist History and Physical   Admit Date:  1/3/2023  8:49 PM   Name:  Shaq Concepcion   Age:  78 y.o. Sex:  female  :  1945   MRN:  [id number]     Presenting Complaint: SOB, near syncope  Reason(s) for Admission: Severe anemia [D64.9]     History of Present Illness:   Shaq Concepcion is a 78 y.o. female with medical history of CAD with AICD, MVR, CHF, COPD, prior GI bleed who presented to ED with SOB and near syncope. Symptoms have been progressive over the past 1-2 months. She saw her PCP today who obtained blood work. She was told to come into the ER as her hgb was 4.4. Upon ER evaluation, hgb is 4.3. She is not currently hypoxic. BP stable, but on the lower need with SBPs of . Transfusion has been ordered by ER. Hospitalist asked to admit. Review of Systems:  10 systems reviewed and negative except as noted in HPI.   Assessment & Plan:   * Severe anemia  Assessment & Plan  - Hgb 4.3 here  - Transfusion ordered  - Will trend hgb and continue to transfuse as needed to keep hgb >8, given cardiac history  - Given h/o prior GIB, consult with GI    Upper GI bleed  Assessment & Plan  - H/O GIB a few months ago  - No antiplatelets/anticoagulants  - Transfuse to keep hgb >8  - Consult to GI  - IVFs to maintain BP given pre-syncope  - IV protonix    Benign hypertensive heart disease with CHF (congestive heart failure) (formerly [facility])  Assessment & Plan  - Holding BP meds at this time due to possibility of active GIB with borderline pressures  - Can restart as appropriate    AICD (automatic cardioverter/defibrillator) present  Assessment & Plan  - Of note    Panlobular emphysema (formerly [facility])  Assessment & Plan  - Of note  - No in exacerbation    S/P mitral valve repair  Assessment & Plan  - Of note    Chronic systolic HF (heart failure) (formerly [facility])  Assessment & Plan  - Of note  - Giving IVFs due to borderline pressures and possible active GIB  - Monitor respiratory status closely    S/P CABG (coronary artery bypass graft)  Assessment & Plan  - Of note      Disposition: inpatient    Past medical history reviewed. Past Medical History:   Diagnosis Date    Acute mitral regurgitation     AICD (automatic cardioverter/defibrillator) present 2015    Anterior myocardial infarction [facility])     Arrhythmia     Benign hypertensive heart disease with CHF (congestive heart failure) ([facility] 2015    CAD (coronary artery disease)     [id number]; PC 1999I; Ischemic CM EF 25%    Carotid artery stenosis without cerebral infarction 2015    Chronic ischemic heart disease 2015    Ch[facility] systolic HF [facility] 3/17/2016     - EF 30-35%. 2010 - Single leadICD - Biotronik 2012:  EF 45-50% 2013:  EF 40-45% 2013:  EF 45% 2014:  EF 45% 2015:  EF 40-45%    Coronary atherosclerosis of native coronary artery     Dyspnea on exertion     GERD (gastroesophageal reflux disease)     GI bleed 2009    Hyperlipidemia     Hypertension     Influenza A (H5N1) 2019    Menopause     52's    OA (osteoarthritis)     PUD (peptic ulcer disease)     hx ulcer  dx 2009    S/P mitral valve repair 3/17/2016    Tobacco use disorder      Past surgical history reviewed.     Past Surgical History:   Procedure Laterality Date    CARDIAC CATHETERIZATION      PCI  x 1 - pt does not have stent card     SECTION      x2    CORONARY ARTERY BYPASS GRAFT      MITRALPLASTY W CP BYPASS      with repair device    PACEMAKER      TUBAL LIGATION      UPPER GASTROINTESTINAL ENDOSCOPY N/A 10/27/2022    EGD BIOPSY performed by Isi Mcdonough MD at [facility] ENDOSCOPY      Allergies   Allergen Reactions    Influenza Virus Vaccine Other (See Comments)      Social History     Tobacco Use    Smoking status: Former     Packs/day: 0.25     Types: Cigarettes     Start date: 1975     Quit date: 2009     Years since quittin.5    Smokeless tobacco: Never    Tobacco comments:     Quit smokin09   Substance Use Topics    Alcohol use: No      Family History   Problem Relation Age of Onset    Heart Surgery Brother     Heart Disease Sister     Heart Disease Father     Heart Attack Father     Breast Cancer Neg Hx     Coronary Art Dis Other     No Known Problems Mother     Heart Disease Brother       Family history reviewed and noncontributory to patient's acute condition; no relevant family history unless otherwise noted above.   Immunization History   Administered Date(s) Administered    COVID-19, MODERNA BLUE border, Primary or Immunocompromised, (age 12y+), IM, 100 mcg/0.5mL 04/23/2021, 06/17/2021, 12/24/2021    Influenza Trivalent 11/12/2015, 02/22/2017    Influenza Virus Vaccine 11/12/2015, 02/22/2017, 09/25/2019    Influenza, FLUARIX, FLULAVAL, Tianna Care (age 10 mo+) AND AFLURIA, (age 1 y+), PF, 0.5mL 09/25/2019    Influenza, High Dose (Fluzone 65 yrs and older) 10/14/2018    PPD Test 10/25/2022    Pneumococcal Conjugate 13-valent ([id number]) 04/05/2018    Pneumococcal Polysaccharide (Ybjvbskmf16) 11/04/2013    Zoster Recombinant (Shingrix) 04/24/2019     PTA Medications:  Current Outpatient Medications   Medication Instructions    amLODIPine (NORVASC) 5 MG tablet TAKE 1 TABLET BY MOUTH DAILY    aspirin 81 mg, Oral, DAILY    carvedilol (COREG) 25 MG tablet TAKE 1 TABLET BY MOUTH TWICE DAILY WITH MEALS    ferrous sulfate (FE TABS 325) 325 mg, 2 TIMES DAILY    lisinopril (PRINIVIL;ZESTRIL) 40 mg, Oral, DAILY    pantoprazole (PROTONIX) 40 mg, Oral, 2 TIMES DAILY BEFORE MEALS    rosuvastatin (CRESTOR) 20 MG tablet TAKE 1 TABLET BY MOUTH DAILY    vitamin D3 (CHOLECALCIFEROL) 5,000 Units, Oral, DAILY       Objective:   Patient Vitals for the past 24 hrs:   Temp Pulse Resp BP SpO2   01/04/23 0511 98.2 °F (36.8 °C) 78 22 -- 99 %   01/04/23 0505 98.2 °F (36.8 °C) 80 20 (!) 100/56 98 %   01/04/23 0459 98.3 °F (36.8 °C) 78 19 (!) 100/52 100 %   01/04/23 0450 98.2 °F (36.8 °C) 80 21 (!) 97/51 98 %   01/04/23 0143 98.3 °F (36.8 °C) 80 17 (!) 108/50 100 %   01/04/23 0054 98.4 °F (36.9 °C) -- -- -- --   01/03/23 2347 -- 83 21 (!) 109/53 98 %   01/03/23 2338 -- 83 21 -- 100 %   01/03/23 2334 98.1 °F (36.7 °C) 81 18 (!) 110/49 99 %   01/03/23 2327 98.4 °F (36.9 °C) 83 19 (!) 112/53 99 %   01/03/23 2322 98.2 °F (36.8 °C) 83 17 (!) 111/40 98 %   01/03/23 2045 97.9 °F (36.6 °C) 89 22 98/85 98 %          Estimated body mass index is 24.96 kg/m² as calculated from the following:    Height as of this encounter: 5' 5\" (1.651 m). Weight as of this encounter: 150 lb (68 kg). Intake/Output Summary (Last 24 hours) at 1/4/2023 0557  Last data filed at 1/4/2023 0143  Gross per 24 hour   Intake 310 ml   Output --   Net 310 ml         Physical Exam:  General:    Well nourished. No overt distress  Head:  Normocephalic, atraumatic  Eyes:  Sclerae appear normal.  Pupils equally round. HENT:  Nares appear normal, no drainage. Moist mucous membranes  Neck:  No restricted ROM. Trachea midline  CV:   RRR. Murmur  Lungs:   CTAB. No wheezing, rhonchi, or rales. Appears even, unlabored  Abdomen: Bowel sounds present. Soft, nontender, nondistended. Extremities: Warm and dry. No cyanosis or clubbing. No edema. Skin:     No rashes. Normal turgor. Normal coloration  Neuro:  Cranial nerves II-XII grossly intact. Sensation intact  Psych:  Normal mood and affect.   Alert and oriented    Data Ordered and Personally Reviewed:    Last 24hr Labs:  Recent Results (from the past 24 hour(s))   TSH    Collection Time: 01/03/23  8:37 AM   Result Value Ref Range    TSH, 3RD GENERATION 2.280 0.358 - 3.740 uIU/mL   Vitamin D 25 Hydroxy    Collection Time: 01/03/23  8:37 AM   Result Value Ref Range    Vit D, 25-Hydroxy 37.3 30.0 - 100.0 ng/mL   Lipid Panel    Collection Time: 01/03/23  8:37 AM   Result Value Ref Range    Cholesterol, Total 73 <200 MG/DL    Triglycerides 73 35 - 150 MG/DL    HDL 35 (L) 40 - 60 MG/DL    LDL Calculated 23.4 <100 MG/DL    VLDL Cholesterol Calculated 14.6 6.0 - 23.0 MG/DL    Chol/HDL Ratio 2.1     Comprehensive Metabolic Panel    Collection Time: 01/03/23  8:37 AM   Result Value Ref Range    Sodium 142 133 - 143 mmol/L    Potassium 3.3 (L) 3.5 - 5.1 mmol/L    Chloride 110 101 - 110 mmol/L    CO2 26 21 - 32 mmol/L    Anion Gap 6 2 - 11 mmol/L    Glucose 98 65 - 100 mg/dL    BUN 12 8 - 23 MG/DL    Creatinine 1.00 0.6 - 1.0 MG/DL    Est, Glom Filt Rate 59 (L) >60 ml/min/1.73m2    Calcium 9.2 8.3 - 10.4 MG/DL    Total Bilirubin 0.6 0.2 - 1.1 MG/DL    ALT 13 12 - 65 U/L    AST 12 (L) 15 - 37 U/L    Alk Phosphatase 50 50 - 136 U/L    Total Protein 6.7 6.3 - 8.2 g/dL    Albumin 3.3 3.2 - 4.6 g/dL    Globulin 3.4 2.8 - 4.5 g/dL    Albumin/Globulin Ratio 1.0 0.4 - 1.6     CBC with Auto Differential    Collection Time: 01/03/23  8:37 AM   Result Value Ref Range    WBC 6.7 4.3 - 11.1 K/uL    RBC 2.48 (L) 4.05 - 5.2 M/uL    Hemoglobin 4.4 (LL) 11.7 - 15.4 g/dL    Hematocrit 17.6 (L) 35.8 - 46.3 %    MCV 71.0 (L) 82 - 102 FL    MCH 17.7 (L) 26.1 - 32.9 PG    MCHC 25.0 (L) 31.4 - 35.0 g/dL    RDW 21.6 (H) 11.9 - 14.6 %    Platelets 614 675 - 077 K/uL    MPV 10.4 9.4 - 12.3 FL    nRBC 0.07 0.0 - 0.2 K/uL    Differential Type AUTOMATED      Seg Neutrophils 75 43 - 78 %    Lymphocytes 16 13 - 44 %    Monocytes 8 4.0 - 12.0 %    Eosinophils % 1 0.5 - 7.8 %    Basophils 1 0.0 - 2.0 %    Immature Granulocytes 0 0.0 - 5.0 %    Segs Absolute 5.0 1.7 - 8.2 K/UL    Absolute Lymph # 1.1 0.5 - 4.6 K/UL    Absolute Mono # 0.5 0.1 - 1.3 K/UL    Absolute Eos # 0.1 0.0 - 0.8 K/UL    Basophils Absolute 0.0 0.0 - 0.2 K/UL    Absolute Immature Granulocyte 0.0 0.0 - 0.5 K/UL   PREPARE RBC (CROSSMATCH), 2 Units    Collection Time: 01/03/23  9:00 PM   Result Value Ref Range    History Check Historical check performed    CBC with Auto Differential    Collection Time: 01/03/23  9:05 PM   Result Value Ref Range    WBC 7.0 4.3 - 11.1 K/uL    RBC 2.37 (L) 4.05 - 5.2 M/uL    Hemoglobin 4.3 (LL) 11.7 - 15.4 g/dL    Hematocrit 16.1 (L) 35.8 - 46.3 %    MCV 67.9 (L) 82 - 102 FL    MCH 18.1 (L) 26.1 - 32.9 PG    MCHC 26.7 (L) 31.4 - 35.0 g/dL    RDW 21.3 (H) 11.9 - 14.6 %    Platelets 496 121 - 260 K/uL    MPV 9.9 9.4 - 12.3 FL    nRBC 0.05 0.0 - 0.2 K/uL    Differential Type AUTOMATED      Seg Neutrophils 67 43 - 78 %    Lymphocytes 20 13 - 44 %    Monocytes 10 4.0 - 12.0 %    Eosinophils % 1 0.5 - 7.8 %    Basophils 0 0.0 - 2.0 %    Immature Granulocytes 2 0.0 - 5.0 %    Segs Absolute 4.7 1.7 - 8.2 K/UL    Absolute Lymph # 1.4 0.5 - 4.6 K/UL    Absolute Mono # 0.7 0.1 - 1.3 K/UL    Absolute Eos # 0.1 0.0 - 0.8 K/UL    Basophils Absolute 0.0 0.0 - 0.2 K/UL    Absolute Immature Granulocyte 0.1 0.0 - 0.5 K/UL   CMP    Collection Time: 01/03/23  9:05 PM   Result Value Ref Range    Sodium 141 133 - 143 mmol/L    Potassium 3.1 (L) 3.5 - 5.1 mmol/L    Chloride 107 101 - 110 mmol/L    CO2 27 21 - 32 mmol/L    Anion Gap 7 2 - 11 mmol/L    Glucose 116 (H) 65 - 100 mg/dL    BUN 13 8 - 23 MG/DL    Creatinine 1.00 0.6 - 1.0 MG/DL    Est, Glom Filt Rate 59 (L) >60 ml/min/1.73m2    Calcium 9.4 8.3 - 10.4 MG/DL    Total Bilirubin 0.6 0.2 - 1.1 MG/DL    ALT 14 12 - 65 U/L    AST 11 (L) 15 - 37 U/L    Alk Phosphatase 51 50 - 136 U/L    Total Protein 7.1 6.3 - 8.2 g/dL    Albumin 3.3 3.2 - 4.6 g/dL    Globulin 3.8 2.8 - 4.5 g/dL    Albumin/Globulin Ratio 0.9 0.4 - 1.6     Protime-INR    Collection Time: 01/03/23  9:05 PM   Result Value Ref Range    Protime 15.6 (H) 12.6 - 14.3 sec    INR 1.2     TYPE AND SCREEN    Collection Time: 01/03/23  9:05 PM   Result Value Ref Range    Crossmatch expiration date 01/06/2023,2359     ABO/Rh O POSITIVE     Antibody Screen NEG     Blood Bank Comment CALLED ER AT 2248 BLOOD READY 1.3.23 EOR     Unit Number [id number]     Product Code Blood Bank RC LR     Unit Divison 00     Dispense Status Blood Bank ISSUED     Crossmatch Result Compatible     Unit Number [id number]     Product Code Blood Bank  LR     Unit Divison 00     Dispense Status Blood Bank ISSUED     Crossmatch Result Compatible    Brain Natriuretic Peptide    Collection Time: 01/03/23  9:05 PM   Result Value Ref Range    NT Pro-BNP 1,432 (H) <450 PG/ML   Troponin    Collection Time: 01/03/23  9:05 PM   Result Value Ref Range    Troponin, High Sensitivity 16.2 (H) 0 - 14 pg/mL   EKG 12 Lead (Select if Upper Abdominal Pain or symptoms of SOB,or Tachycardia)    Collection Time: 01/03/23  9:05 PM   Result Value Ref Range    Ventricular Rate 85 BPM    QRS Duration 114 ms    Q-T Interval 380 ms    QTc Calculation (Bazett) 452 ms    R Axis 39 degrees    T Axis 101 degrees    Diagnosis Accelerated Junctional rhythm    Hemoglobin and Hematocrit    Collection Time: 01/04/23  3:30 AM   Result Value Ref Range    Hemoglobin 4.9 (LL) 11.7 - 15.4 g/dL    Hematocrit 18.4 (L) 35.8 - 46.3 %       Signed:  Cindi Davis MD

## 2023-01-04 NOTE — ASSESSMENT & PLAN NOTE
- Of note  - Giving IVFs due to borderline pressures and possible active GIB  - Monitor respiratory status closely

## 2023-01-04 NOTE — ED NOTES
TRANSFER - OUT REPORT:    Verbal report given to ARNAUD John on Kristal Brown  being transferred to GI Lab for routine progression of patient care       Report consisted of patient's Situation, Background, Assessment and   Recommendations(SBAR). Information from the following report(s) ED SBAR was reviewed with the receiving nurse. [place] Assessment: No data recorded  Lines:   Peripheral IV 01/03/23 Right Forearm (Active)   Site Assessment Clean, dry & intact 01/03/23 2106   Line Status Blood return noted 01/03/23 2106   Phlebitis Assessment No symptoms 01/03/23 2106   Infiltration Assessment 0 01/03/23 2106   Alcohol Cap Used No 01/03/23 2106   Dressing Status New dressing applied 01/03/23 2106   Dressing Type Transparent 01/03/23 2106   Dressing Intervention New 01/03/23 2106       Peripheral IV 01/04/23 Left Antecubital (Active)        Opportunity for questions and clarification was provided.       Patient transported with:  Martha Tripp RN  01/04/23 1007

## 2023-01-04 NOTE — ANESTHESIA PRE PROCEDURE
Department of Anesthesiology  Preprocedure Note       Name:  Adriano Goetz   Age:  78 y.o.  :  1945                                          MRN:  [id number]         Date:  2023      Surgeon: Efra Parson):  Srikanth Ryan MD    Procedure: Procedure(s):  EGD ESOPHAGOGASTRODUODENOSCOPY ER 3 *HGB recheck    Medications prior to admission:   Prior to Admission medications    Medication Sig Start Date End Date Taking? Authorizing Provider   pantoprazole (PROTONIX) 40 MG tablet Take 1 tablet by mouth 2 times daily (before meals) 10/28/22 11/27/22  Gerardo Harrington DO   carvedilol (COREG) 25 MG tablet TAKE 1 TABLET BY MOUTH TWICE DAILY WITH MEALS 22   Cheryl Long MD   lisinopril (PRINIVIL;ZESTRIL) 40 MG tablet Take 1 tablet by mouth daily 22   Julisa Law APRN - CNP   amLODIPine (NORVASC) 5 MG tablet TAKE 1 TABLET BY MOUTH DAILY 22   Allison Lopez APRN - CNP   aspirin 81 MG EC tablet Take 81 mg by mouth daily 09   Ar Automatic Reconciliation   vitamin D3 (CHOLECALCIFEROL) 125 MCG (5000 UT) TABS tablet Take 5,000 Units by mouth daily    Ar Automatic Reconciliation   ferrous sulfate (FE TABS 325) 325 (65 Fe) MG EC tablet Take 325 mg by mouth 2 times daily  Patient not taking: Reported on 2022 3/13/19   Ar Automatic Reconciliation   rosuvastatin (CRESTOR) 20 MG tablet TAKE 1 TABLET BY MOUTH DAILY 22   Ar Automatic Reconciliation       Current medications:    No current facility-administered medications for this visit. No current outpatient medications on file.      Facility-Administered Medications Ordered in Other Visits   Medication Dose Route Frequency Provider Last Rate Last Admin    [Held by provider] amLODIPine (NORVASC) tablet 5 mg  5 mg Oral Daily Narendra Clemons MD        [Held by provider] carvedilol (COREG) tablet 25 mg  25 mg Oral BID  Narendra Clemons MD        [Held by provider] lisinopril (PRINIVIL;ZESTRIL) tablet 40 mg  40 mg Oral Daily Narendra Clemons MD        rosuvastatin (CRESTOR) tablet 20 mg  20 mg Oral Daily Jean Carlos Doyle MD   20 mg at 01/04/23 1013    sodium chloride flush 0.9 % injection 5-40 mL  5-40 mL IntraVENous 2 times per day Jean Carlos Doyle MD        sodium chloride flush 0.9 % injection 5-40 mL  5-40 mL IntraVENous PRN Jean Carlos Doyle MD        0.9 % sodium chloride infusion   IntraVENous PRN Jean Carlos Doyle MD        ondansetron (ZOFRAN-ODT) disintegrating tablet 4 mg  4 mg Oral Q8H PRN Jean Carlos Doyle MD        Or    [facility]ron [facility]) injection 4 mg  4 mg IntraVENous Q6H PRN Jean Carlos Doyle MD        polyethylene glycol [facility]) packet 17 g  17 g Oral Daily PRN Jean Carlos Doyle MD        acetaminophen (TYLENOL) tablet 650 mg  650 mg Oral Q6H PRN Jean Carlos Doyle MD        Or    acetaminophen (TYLENOL) suppository 650 mg  650 mg Rectal Q6H PRN Jean Carlos Doyle MD        0.9 % sodium chloride infusion   IntraVENous PRN Charity Shore Ala, MD        potassium chloride (KLOR-CON M) extended release tablet 40 mEq  40 mEq Oral BID WC Marisa Turpin MD   40 mEq at 01/04/23 1013    pantoprazole (PROTONIX) 40 mg in sodium chloride (PF) 0.9 % 10 mL injection  40 mg IntraVENous Q12H Marisa Turpin MD        0.9 % sodium chloride infusion   IntraVENous PRN Zena Duvall, APRN - CNP           Allergies:     Allergies   Allergen Reactions    Influenza Virus Vaccine Other (See Comments)       Problem List:    Patient Active Problem List   Diagnosis Code    S/P CABG (coronary artery bypass graft) Z95.1    Carotid artery stenosis without cerebral infarction I65.29    Hypoxia R09.02    Chronic systolic HF (heart failure) (HCC) I50.22    S/P mitral valve repair Z98.890    Iron deficiency anemia due to chronic blood loss D50.0    Panlobular emphysema ([address] J43.1    AICD (automatic cardioverter/defibrillator) present Z95.810    Pure hypercholesterolemia E78.00    Coronary atherosclerosis of native coronary artery I25.10    Benign hypertensive heart disease with CHF (congestive heart failure) [facility] I11.0    Syncope and collapse R55    COVID-19 with multiple comorbidities U07.1    Upper GI bleed K92.2    Severe anemia D64.9       Past Medical History:        Diagnosis Date    Acute mitral regurgitation     AICD (automatic cardioverter/defibrillator) present 2015    Anterior [facility] infarction [facility])     Arrhythmia     Benign hypertensive heart disease with CHF (congestive [facility] failure) [facility] 2015    CAD (coronary artery disease)     [id number]; PC 1999I; Ischemic CM EF 25%    Carotid artery stenosis without cerebral infarction 2015    Chronic ischemic heart disease 2015    Chronic systolic HF (heart failure) ([facility] 3/17/2016     - EF 30-35%.  2010 - Single leadICD - Biotronik 2012:  EF 45-50% 2013:  EF 40-45% 2013:  EF 45% 2014:  EF 45% 2015:  EF 40-45%    Coronary atherosclerosis of native coronary artery     Dyspnea on exertion     GERD (gastroesophageal reflux disease)     GI bleed 2009    Hyperlipidemia     Hypertension     Influenza A (H5N1) 2019    Menopause     52's    OA (osteoarthritis)     PUD (peptic ulcer disease)     hx ulcer  dx 2009    S/P mitral valve repair 3/17/2016    Tobacco use disorder        Past Surgical History:        Procedure Laterality Date   [facility]    PCI  x 1 - pt does not have stent card     SECTION      x2    CORONARY ARTERY BYPASS GRAFT      MITRALPLASTY W CP BYPASS      with repair device    PACEMAKER      TUBAL LIGATION      UPPER GASTROINTESTINAL ENDOSCOPY N/A 10/27/2022    EGD BIOPSY performed by Daysi Khalil MD at [facility] ENDOSCOPY       Social History:    Social History     Tobacco Use    Smoking status: Former     Packs/day: 0.25     Types: Cigarettes     Start date: 1975     Quit date: 2009     Years since quittin.5    Smokeless tobacco: Never    Tobacco comments: Quit smokin09   Substance Use Topics    Alcohol use: No                                Counseling given: Not Answered  Tobacco comments: Quit smokin09      Vital Signs (Current): There were no vitals filed for this visit. BP Readings from Last 3 Encounters:   23 115/66   22 114/64   10/28/22 126/80       NPO Status:                                                                                 BMI:   Wt Readings from Last 3 Encounters:   23 150 lb (68 kg)   22 147 lb (66.7 kg)   10/25/22 150 lb (68 kg)     There is no height or weight on file to calculate BMI.    CBC:   Lab Results   Component Value Date/Time    WBC 6.0 2023 07:45 AM    RBC 3.09 2023 07:45 AM    HGB 7.2 2023 11:25 AM    HCT 24.2 2023 11:25 AM    MCV 74.4 2023 07:45 AM    MCV 92.6 2021 08:35 AM    RDW 21.6 2023 07:45 AM     2023 07:45 AM       CMP:   Lab Results   Component Value Date/Time     2023 07:09 AM    K 3.0 2023 07:09 AM     2023 07:09 AM    CO2 25 2023 07:09 AM    BUN 10 2023 07:09 AM    CREATININE 0.80 2023 07:09 AM    GFRAA >60 2022 10:00 AM    AGRATIO 1.5 2021 08:30 AM    LABGLOM >60 2023 07:09 AM    GLUCOSE 103 2023 07:09 AM    PROT 7.1 2023 09:05 PM    CALCIUM 8.4 2023 07:09 AM    BILITOT 0.6 2023 09:05 PM    ALKPHOS 51 2023 09:05 PM    ALKPHOS 60 2021 08:30 AM    AST 11 2023 09:05 PM    ALT 14 2023 09:05 PM       POC Tests: No results for input(s): POCGLU, POCNA, POCK, POCCL, POCBUN, POCHEMO, POCHCT in the last 72 hours.     Coags:   Lab Results   Component Value Date/Time    PROTIME 15.6 2023 09:05 PM    INR 1.2 2023 09:05 PM       HCG (If Applicable): No results found for: PREGTESTUR, PREGSERUM, HCG, HCGQUANT     ABGs: No results found for: PHART, PO2ART, YPO3OWB, YXL4MKX, BEART, M7RAIJTV     Type & Screen (If Applicable):  No results found for: LABABO, LABRH    Drug/Infectious Status (If Applicable):  Lab Results   Component Value Date/Time    HEPCAB <0.1 08/03/2017 08:55 AM       COVID-19 Screening (If Applicable):   Lab Results   Component Value Date/Time    COVID19 Detected 10/25/2022 04:55 PM           Anesthesia Evaluation  Patient summary reviewed no history of anesthetic complications:   Airway: Mallampati: I          Dental:    (+) edentulous      Pulmonary: breath sounds clear to auscultation  (+) COPD:                             Cardiovascular:  Exercise tolerance: good (>4 METS),   (+) valvular problems/murmurs (s/p MVR):, pacemaker:, CAD:, CABG/stent:, CHF: systolic, murmur: Grade 2, Mitral,         Rhythm: regular                   ROS comment: Echo 4/2022 showed LVEF 35-40%, mild to moderate MR and mild to moderate TR. Pt is s/p MVR. Neuro/Psych:   Negative Neuro/Psych ROS              GI/Hepatic/Renal:   (+) GERD:,          ROS comment: GI bleed. Endo/Other:    (+) blood dyscrasia: anemia:., .                 Abdominal:             Vascular: negative vascular ROS. Other Findings:             Anesthesia Plan      TIVA     ASA 4       Induction: intravenous. Anesthetic plan and risks discussed with patient.                         Dwain Mackenzie MD   1/4/2023

## 2023-01-04 NOTE — PROGRESS NOTES
Hospitalist Progress Note   Admit Date:  1/3/2023  8:49 PM   Name:  Shaq Concepcion   Age:  78 y.o. Sex:  female  :  1945   MRN:  [id number]   Room:  ENDO/    Presenting Complaint: Abnormal Lab     Reason(s) for Admission: General weakness [R53.1]  Severe anemia [D64.9]  Anemia, unspecified type [D64.9]     Hospital Course: This is a 61-year-old female with past medical history significant for coronary artery disease, status post AICD, MVR, CHF with EF of 35 to 40%, COPD, prior GI bleed presented to the ER with shortness of breath and near syncopal episode. Symptoms have been progressively worsening over the past 1 to 2 months. Hemoglobin on admission was 4.3. GI was consulted. She received 2 units of packed red blood cells. Hemoglobin improved to 6.7. Patient underwent EGD today. Results pending. Subjective & 24hr Events (23): Patient denies any active bleeding per rectum. She used to take oral iron pills until 2 months ago when she stopped taking it. She denies any dark stools. No fever no chills. No chest pain. She feels very weak and tired. Assessment & Plan: This is a 61-year-old female with    Acute on chronic severe symptomatic anemia/acute blood loss anemia/acute upper GI bleed  Hemoglobin on admission 4.3.  GI consulted. Appreciate recommendations. N.p.o. for EGD today. Continue IV Protonix. Trend hemoglobin. Transfuse to keep hemoglobin greater than 7. Hypertension  Chronic systolic CHF not in acute exacerbation  Monitor volume status. AICD    Panlobular emphysema  Not in acute exacerbation. Status post mitral valve repair    Coronary artery disease status post CABG      Anticipated discharge needs:    Anticipate inpatient hospital stay for at least 48 hours.   PT/OT eval.    Diet:  Diet NPO  DVT PPx: SCDs only  Code status: Full Code    Hospital Problems:  Principal Problem:    Severe anemia  Active Problems:    Upper GI bleed    S/P CABG (coronary artery bypass graft)    Chronic systolic HF (heart failure) ([facility])    S/P mitral valve repair    Panlobular emphysema ([facility])    AICD (automatic cardioverter/defibrillator) present    Benign hypertensive heart disease with CHF (congestive heart failure) ([address]  Resolved Problems:    * No resolved hospital problems.  *      Objective:   Patient Vitals for the past 24 hrs:   Temp Pulse Resp BP SpO2   01/04/23 1309 98.8 °F (37.1 °C) 88 16 131/60 97 %   01/04/23 1215 -- 84 26 115/66 99 %   01/04/23 1200 -- 87 27 (!) 119/52 98 %   01/04/23 1145 -- 85 27 (!) 107/51 98 %   01/04/23 1130 -- 83 24 (!) 107/51 97 %   01/04/23 1000 -- 82 26 (!) 121/56 97 %   01/04/23 0945 -- 82 23 115/61 99 %   01/04/23 0930 -- 76 13 (!) 114/55 100 %   01/04/23 0915 -- 78 24 (!) 115/57 100 %   01/04/23 0900 -- 80 23 (!) 109/53 98 %   01/04/23 0815 -- 86 23 110/65 98 %   01/04/23 0806 98.4 °F (36.9 °C) 81 26 (!) 109/58 99 %   01/04/23 0800 -- 81 26 (!) 109/58 99 %   01/04/23 0758 -- 80 26 (!) 109/58 100 %   01/04/23 0745 -- 83 22 138/64 100 %   01/04/23 0532 -- 78 25 (!) 103/50 99 %   01/04/23 0511 98.2 °F (36.8 °C) 78 22 -- 99 %   01/04/23 0505 98.2 °F (36.8 °C) 80 20 (!) 100/56 98 %   01/04/23 0459 98.3 °F (36.8 °C) 78 19 (!) 100/52 100 %   01/04/23 0450 98.2 °F (36.8 °C) 80 21 (!) 97/51 98 %   01/04/23 0143 98.3 °F (36.8 °C) 80 17 (!) 108/50 100 %   01/04/23 0054 98.4 °F (36.9 °C) -- -- -- --   01/03/23 2347 -- 83 21 (!) 109/53 98 %   01/03/23 2338 -- 83 21 -- 100 %   01/03/23 2334 98.1 °F (36.7 °C) 81 18 (!) 110/49 99 %   01/03/23 2327 98.4 °F (36.9 °C) 83 19 (!) 112/53 99 %   01/03/23 2322 98.2 °F (36.8 °C) 83 17 (!) 111/40 98 %   01/03/23 2045 97.9 °F (36.6 °C) 89 22 98/85 98 %       Oxygen Therapy  SpO2: 97 %  Pulse via Oximetry: 82 beats per minute  Pulse Oximeter Device Mode: Continuous  Pulse Oximeter Device Location: Finger  O2 Device: None (Room air)    Estimated body mass index is 24.96 kg/m² as calculated from the following: Height as of this encounter: 5' 5\" (1.651 m). Weight as of this encounter: 150 lb (68 kg). Intake/Output Summary (Last 24 hours) at 1/4/2023 1517  Last data filed at 1/4/2023 0806  Gross per 24 hour   Intake 620 ml   Output --   Net 620 ml         Physical Exam:     Blood pressure 131/60, pulse 88, temperature 98.8 °F (37.1 °C), temperature source Oral, resp. rate 16, height 5' 5\" (1.651 m), weight 150 lb (68 kg), SpO2 97 %. General:    Elderly female, chronically ill-appearing,    Head:  Normocephalic, atraumatic  Eyes:  Pale, no jaundice. Pupils equally round. ENT:  Nares appear normal.  Moist oral mucosa  Neck:  No restricted ROM. Trachea midline   CV:   RRR. No m/r/g. No jugular venous distension. Lungs:   CTAB. No wheezing, rhonchi, or rales. Symmetric expansion. Abdomen:   Soft, nontender, nondistended. Extremities: No cyanosis or clubbing. No edema  Skin:     No rashes and normal coloration. Warm and dry. Neuro:  CN II-XII grossly intact. Sensation intact. Psych:  Normal mood and affect.       I have personally reviewed labs and tests showing:  Recent Labs:  Recent Results (from the past 48 hour(s))   TSH    Collection Time: 01/03/23  8:37 AM   Result Value Ref Range    TSH, 3RD GENERATION 2.280 0.358 - 3.740 uIU/mL   Vitamin D 25 Hydroxy    Collection Time: 01/03/23  8:37 AM   Result Value Ref Range    Vit D, 25-Hydroxy 37.3 30.0 - 100.0 ng/mL   Lipid Panel    Collection Time: 01/03/23  8:37 AM   Result Value Ref Range    Cholesterol, Total 73 <200 MG/DL    Triglycerides 73 35 - 150 MG/DL    HDL 35 (L) 40 - 60 MG/DL    LDL Calculated 23.4 <100 MG/DL    VLDL Cholesterol Calculated 14.6 6.0 - 23.0 MG/DL    Chol/HDL Ratio 2.1     Comprehensive Metabolic Panel    Collection Time: 01/03/23  8:37 AM   Result Value Ref Range    Sodium 142 133 - 143 mmol/L    Potassium 3.3 (L) 3.5 - 5.1 mmol/L    Chloride 110 101 - 110 mmol/L    CO2 26 21 - 32 mmol/L    Anion Gap 6 2 - 11 mmol/L Glucose 98 65 - 100 mg/dL    BUN 12 8 - 23 MG/DL    Creatinine 1.00 0.6 - 1.0 MG/DL    Est, Glom Filt Rate 59 (L) >60 ml/min/1.73m2    Calcium 9.2 8.3 - 10.4 MG/DL    Total Bilirubin 0.6 0.2 - 1.1 MG/DL    ALT 13 12 - 65 U/L    AST 12 (L) 15 - 37 U/L    Alk Phosphatase 50 50 - 136 U/L    Total Protein 6.7 6.3 - 8.2 g/dL    Albumin 3.3 3.2 - 4.6 g/dL    Globulin 3.4 2.8 - 4.5 g/dL    Albumin/Globulin Ratio 1.0 0.4 - 1.6     CBC with Auto Differential    Collection Time: 01/03/23  8:37 AM   Result Value Ref Range    WBC 6.7 4.3 - 11.1 K/uL    RBC 2.48 (L) 4.05 - 5.2 M/uL    Hemoglobin 4.4 (LL) 11.7 - 15.4 g/dL    Hematocrit 17.6 (L) 35.8 - 46.3 %    MCV 71.0 (L) 82 - 102 FL    MCH 17.7 (L) 26.1 - 32.9 PG    MCHC 25.0 (L) 31.4 - 35.0 g/dL    RDW 21.6 (H) 11.9 - 14.6 %    Platelets 935 472 - 716 K/uL    MPV 10.4 9.4 - 12.3 FL    nRBC 0.07 0.0 - 0.2 K/uL    Differential Type AUTOMATED      Seg Neutrophils 75 43 - 78 %    Lymphocytes 16 13 - 44 %    Monocytes 8 4.0 - 12.0 %    Eosinophils % 1 0.5 - 7.8 %    Basophils 1 0.0 - 2.0 %    Immature Granulocytes 0 0.0 - 5.0 %    Segs Absolute 5.0 1.7 - 8.2 K/UL    Absolute Lymph # 1.1 0.5 - 4.6 K/UL    Absolute Mono # 0.5 0.1 - 1.3 K/UL    Absolute Eos # 0.1 0.0 - 0.8 K/UL    Basophils Absolute 0.0 0.0 - 0.2 K/UL    Absolute Immature Granulocyte 0.0 0.0 - 0.5 K/UL   PREPARE RBC (CROSSMATCH), 2 Units    Collection Time: 01/03/23  9:00 PM   Result Value Ref Range    History Check Historical check performed    CBC with Auto Differential    Collection Time: 01/03/23  9:05 PM   Result Value Ref Range    WBC 7.0 4.3 - 11.1 K/uL    RBC 2.37 (L) 4.05 - 5.2 M/uL    Hemoglobin 4.3 (LL) 11.7 - 15.4 g/dL    Hematocrit 16.1 (L) 35.8 - 46.3 %    MCV 67.9 (L) 82 - 102 FL    MCH 18.1 (L) 26.1 - 32.9 PG    MCHC 26.7 (L) 31.4 - 35.0 g/dL    RDW 21.3 (H) 11.9 - 14.6 %    Platelets 494 648 - 963 K/uL    MPV 9.9 9.4 - 12.3 FL    nRBC 0.05 0.0 - 0.2 K/uL    Differential Type AUTOMATED      Seg Neutrophils 67 43 - 78 %    Lymphocytes 20 13 - 44 %    Monocytes 10 4.0 - 12.0 %    Eosinophils % 1 0.5 - 7.8 %    Basophils 0 0.0 - 2.0 %    Immature Granulocytes 2 0.0 - 5.0 %    Segs Absolute 4.7 1.7 - 8.2 K/UL    Absolute Lymph # 1.4 0.5 - 4.6 K/UL    Absolute Mono # 0.7 0.1 - 1.3 K/UL    Absolute Eos # 0.1 0.0 - 0.8 K/UL    Basophils Absolute 0.0 0.0 - 0.2 K/UL    Absolute Immature Granulocyte 0.1 0.0 - 0.5 K/UL   CMP    Collection Time: 01/03/23  9:05 PM   Result Value Ref Range    Sodium 141 133 - 143 mmol/L    Potassium 3.1 (L) 3.5 - 5.1 mmol/L    Chloride 107 101 - 110 mmol/L    CO2 27 21 - 32 mmol/L    Anion Gap 7 2 - 11 mmol/L    Glucose 116 (H) 65 - 100 mg/dL    BUN 13 8 - 23 MG/DL    Creatinine 1.00 0.6 - 1.0 MG/DL    Est, Glom Filt Rate 59 (L) >60 ml/min/1.73m2    Calcium 9.4 8.3 - 10.4 MG/DL    Total Bilirubin 0.6 0.2 - 1.1 MG/DL    ALT 14 12 - 65 U/L    AST 11 (L) 15 - 37 U/L    Alk Phosphatase 51 50 - 136 U/L    Total Protein 7.1 6.3 - 8.2 g/dL    Albumin 3.3 3.2 - 4.6 g/dL    Globulin 3.8 2.8 - 4.5 g/dL    Albumin/Globulin Ratio 0.9 0.4 - 1.6     Protime-INR    Collection Time: 01/03/23  9:05 PM   Result Value Ref Range    Protime 15.6 (H) 12.6 - 14.3 sec    INR 1.2     TYPE AND SCREEN    Collection Time: 01/03/23  9:05 PM   Result Value Ref Range    Crossmatch expiration date 01/06/2023,2515     ABO/Rh O POSITIVE     Antibody Screen NEG     Blood Bank Comment       CALLED ER AT 2248 BLOOD READY 1.3.23 [facility]  CALLED ER DESK AT 1034 BLOOD READY 1.4.23 [facility]      Unit Number [id number]     Product Code Blood Bank RC LR     Unit Divison 00     Dispense Status Blood Bank TRANSFUSED     Crossmatch Result Compatible     Unit Number [id number]     Product Code Blood Bank RC LR     Unit Divison 00     Dispense Status Blood Bank ISSUED     Crossmatch Result Compatible     Unit Number [id number]     Product Code Blood Bank RC LR     Unit Divison 00     Dispense Status Blood Bank ALLOCATED     Crossmatch Result Compatible    Brain Natriuretic Peptide    Collection Time: 01/03/23  9:05 PM   Result Value Ref Range    NT Pro-BNP 1,432 (H) <450 PG/ML   Troponin    Collection Time: 01/03/23  9:05 PM   Result Value Ref Range    Troponin, High Sensitivity 16.2 (H) 0 - 14 pg/mL   EKG 12 Lead (Select if Upper Abdominal Pain or symptoms of SOB,or Tachycardia)    Collection Time: 01/03/23  9:05 PM   Result Value Ref Range    Ventricular Rate 85 BPM    QRS Duration 114 ms    Q-T Interval 380 ms    QTc Calculation (Bazett) 452 ms    R Axis 39 degrees    T Axis 101 degrees    Diagnosis Normal sinus rhythm or ectopic atrial rhythm    Hemoglobin and Hematocrit    Collection Time: 01/04/23  3:30 AM   Result Value Ref Range    Hemoglobin 4.9 (LL) 11.7 - 15.4 g/dL    Hematocrit 18.4 (L) 35.8 - 46.3 %   Basic Metabolic Panel w/ Reflex to MG    Collection Time: 01/04/23  7:09 AM   Result Value Ref Range    Sodium 144 (H) 133 - 143 mmol/L    Potassium 3.0 (L) 3.5 - 5.1 mmol/L    Chloride 112 (H) 101 - 110 mmol/L    CO2 25 21 - 32 mmol/L    Anion Gap 7 2 - 11 mmol/L    Glucose 103 (H) 65 - 100 mg/dL    BUN 10 8 - 23 MG/DL    Creatinine 0.80 0.6 - 1.0 MG/DL    Est, Glom Filt Rate >60 >60 ml/min/1.73m2    Calcium 8.4 8.3 - 10.4 MG/DL   Magnesium    Collection Time: 01/04/23  7:09 AM   Result Value Ref Range    Magnesium 2.1 1.8 - 2.4 mg/dL   CBC with Auto Differential    Collection Time: 01/04/23  7:45 AM   Result Value Ref Range    WBC 6.0 4.3 - 11.1 K/uL    RBC 3.09 (L) 4.05 - 5.2 M/uL    Hemoglobin 6.7 (LL) 11.7 - 15.4 g/dL    Hematocrit 23.0 (L) 35.8 - 46.3 %    MCV 74.4 (L) 82 - 102 FL    MCH 21.7 (L) 26.1 - 32.9 PG    MCHC 29.1 (L) 31.4 - 35.0 g/dL    RDW 21.6 (H) 11.9 - 14.6 %    Platelets 629 034 - 413 K/uL    MPV 9.8 9.4 - 12.3 FL    nRBC 0.03 0.0 - 0.2 K/uL    Differential Type AUTOMATED      Seg Neutrophils 72 43 - 78 %    Lymphocytes 17 13 - 44 %    Monocytes 9 4.0 - 12.0 %    Eosinophils % 1 0.5 - 7.8 %    Basophils 1 0.0 - 2.0 %    Immature Granulocytes 1 0.0 - 5.0 %    Segs Absolute 4.3 1.7 - 8.2 K/UL    Absolute Lymph # 1.0 0.5 - 4.6 K/UL    Absolute Mono # 0.6 0.1 - 1.3 K/UL    Absolute Eos # 0.1 0.0 - 0.8 K/UL    Basophils Absolute 0.0 0.0 - 0.2 K/UL    Absolute Immature Granulocyte 0.0 0.0 - 0.5 K/UL   PREPARE RBC (CROSSMATCH), 1 Units    Collection Time: 01/04/23  9:30 AM   Result Value Ref Range    History Check Historical check performed    Iron    Collection Time: 01/04/23 10:18 AM   Result Value Ref Range    Iron 13 (L) 35 - 150 ug/dL   Ferritin    Collection Time: 01/04/23 10:18 AM   Result Value Ref Range    Ferritin 4 (L) 8 - 388 NG/ML   Hemoglobin and Hematocrit    Collection Time: 01/04/23 11:25 AM   Result Value Ref Range    Hemoglobin 7.2 (L) 11.7 - 15.4 g/dL    Hematocrit 24.2 (L) 35.8 - 46.3 %       I have personally reviewed imaging studies showing: Other Studies:  XR CHEST PORTABLE   Final Result   No acute process.           Current Meds:  Current Facility-Administered Medications   Medication Dose Route Frequency    [Held by provider] amLODIPine (NORVASC) tablet 5 mg  5 mg Oral Daily    [Held by provider] carvedilol (COREG) tablet 25 mg  25 mg Oral BID WC    [Held by provider] lisinopril (PRINIVIL;ZESTRIL) tablet 40 mg  40 mg Oral Daily    rosuvastatin (CRESTOR) tablet 20 mg  20 mg Oral Daily    sodium chloride flush 0.9 % injection 5-40 mL  5-40 mL IntraVENous 2 times per day    sodium chloride flush 0.9 % injection 5-40 mL  5-40 mL IntraVENous PRN    0.9 % sodium chloride infusion   IntraVENous PRN    ondansetron (ZOFRAN-ODT) disintegrating tablet 4 mg  4 mg Oral Q8H PRN    Or    ondansetron (ZOFRAN) injection 4 mg  4 mg IntraVENous Q6H PRN    polyethylene glycol (GLYCOLAX) packet 17 g  17 g Oral Daily PRN    acetaminophen (TYLENOL) tablet 650 mg  650 mg Oral Q6H PRN    Or    acetaminophen (TYLENOL) suppository 650 mg  650 mg Rectal Q6H PRN    0.9 % sodium chloride infusion   IntraVENous PRN    potassium chloride (KLOR-CON M) extended release tablet 40 mEq  40 mEq Oral BID WC    pantoprazole (PROTONIX) 40 mg in sodium chloride (PF) 0.9 % 10 mL injection  40 mg IntraVENous Q12H    0.9 % sodium chloride infusion   IntraVENous PRN       Signed:  Lavon Briones MD    Part of this note may have been written by using a voice dictation software. The note has been proof read but may still contain some grammatical/other typographical errors.

## 2023-01-04 NOTE — ED TRIAGE NOTES
Pt was sent to ED for Hg of 4.4, labs drawn this morning, pt has hx of anemia, denies noticing blood in stool, denies abd pain, N/V/D. Endorses generalized fatigue and weakness, States that she has not been taking her iron pills.

## 2023-01-04 NOTE — CONSULTS
[facility] Consult Note       Primary GI Physician: Dr Viviana Ba (last consult)    Referring Provider:  Dr Flor Torres Date:  1/4/2023    Admit Date:  1/3/2023    Chief Complaint:  Anemia with h/o GIB    Subjective:     History of Present Illness:  Patient is a 78 y.o. female with PMH including but not limited to CHF EF 35-40%, MV repair, ICD, CABG, and COPD who is seen in consultation at the request of Dr. Jarvis Tran for anemia with h/o GIB. Patient presented to the ER with SOB and near syncopal episode. She has had weakness over the past 1-2 months. She was seen by her PCP who obtained blood work. She was told to come into the ER as her hgb was 4.4. Upon ER evaluation, hgb is 4.3. she denies any black stools, she states that her stools are brown and \"normal\" appearing. She denies any use of blood thinners. She does take baby ASA, but no other NSAIDS. She reports her only sx were weakness with limb pain, she was taking tylenol arthritis thinking it was just arthritis. Patient reports that she was taking Protonix after her last discharge, but only had 60 pills and stopped when she completed her prescription. Was last seen by our service on 10/25/22 in consultation by Dr Viviana Ba for melena with low hgb.  10/27/22- EGD Dr Song Swanson, noted to have mild Gastritis and duodenitis, no new or old blood identified. Outpatient follow up was planned for colonoscopy. Previously she was seen in our office in 2009 by Dr Yaniv Gunderson in hospital follow up post GIB. She had EGD in hospital per Dr Christopher Mcadams which demonstrated multiple gastric and duodenal ulcers. .  she was H pylori positive and treated for eradication. EGD 10/27 FINDINGS:  OROPHARYNX: Cords and pyriform recesses normal.  ESOPHAGUS: The esophagus is normal. The proximal, mid and distal portions are normal. The Z-Line is intact. GEJ is located at about 38 cm from the incisors.     STOMACH: The fundus on antegrade and retroflex views shows a small hiatal hernia. There are no Dawson's erosions. The body is normal.  Very mild erythema noted in the antrum concerning for a mild gastritis. Multiple biopsies were taken from the antrum, incisura and body of the stomach using cold forceps to rule out H. pylori. DUODENUM: The bulb has mild edema and erythema concerning for a mild duodenitis and second portions are normal.  There is no old or new blood anywhere on this exam.  ASSESSMENT:  1. Mild Gastritis and Duodenitis  2. Small Hiatal Hernia      PMH:  Past Medical History:   Diagnosis Date    Acute mitral regurgitation     AICD (automatic cardioverter/defibrillator) present 2015    Anterior myocardial infarction [facility])     Arrhythmia     Benign hypertensive heart disease with CHF (congestive heart failure) ([facility] 2015    CAD (coronary artery disease)     [id number]; PC 1999I; Ischemic CM EF 25%    Carotid artery stenosis without cerebral infarction 2015    Chronic ischemic heart disease 2015    Chronic systolic HF (heart failure) [facility] 3/17/2016     - EF 30-35%.  2010 - Single leadICD - [employer] 2012:  EF 45-50% 2013:  EF 40-45% 2013:  EF 45% 2014:  EF 45% 2015:  EF 40-45%    Coronary atherosclerosis of native coronary artery     Dyspnea on exertion     GERD (gastroesophageal reflux disease)     GI bleed 2009    Hyperlipidemia     Hypertension     Influenza A (H5N1) 2019    Menopause     52's    OA (osteoarthritis)     PUD (peptic ulcer disease)     hx ulcer  dx 2009    S/P mitral valve repair 3/17/2016    Tobacco use disorder        PSH:  Past Surgical History:   Procedure Laterality Date    One Wayger    PCI  x 1 - pt does not have stent card     SECTION      x2    CORONARY ARTERY BYPASS GRAFT      MITRALPLASTY W CP BYPASS      with repair device    PACEMAKER      TUBAL LIGATION      UPPER GASTROINTESTINAL ENDOSCOPY N/A 10/27/2022    EGD BIOPSY performed by Naomi Curtis MD at [facility] ENDOSCOPY       Allergies: Allergies   Allergen Reactions    Influenza Virus Vaccine Other (See Comments)       Home Medications:  Prior to Admission medications    Medication Sig Start Date End Date Taking?  Authorizing Provider   pantoprazole (PROTONIX) 40 MG tablet Take 1 tablet by mouth 2 times daily (before meals) 10/28/22 11/27/22  Gerardo Harrington DO   carvedilol (COREG) 25 MG tablet TAKE 1 TABLET BY MOUTH TWICE DAILY WITH MEALS 7/11/22   Dylon Singh MD   lisinopril (PRINIVIL;ZESTRIL) 40 MG tablet Take 1 tablet by mouth daily 7/6/22   TIANNA Yuan - CNP   amLODIPine (NORVASC) 5 MG tablet TAKE 1 TABLET BY MOUTH DAILY 7/6/22   TIANNA Mello - CNP   aspirin 81 MG EC tablet Take 81 mg by mouth daily 7/5/09   Ar Automatic Reconciliation   vitamin D3 (CHOLECALCIFEROL) 125 MCG (5000 UT) TABS tablet Take 5,000 Units by mouth daily    Ar Automatic Reconciliation   ferrous sulfate (FE TABS 325) 325 (65 Fe) MG EC tablet Take 325 mg by mouth 2 times daily  Patient not taking: Reported on 11/4/2022 3/13/19   Ar Automatic Reconciliation   rosuvastatin (CRESTOR) 20 MG tablet TAKE 1 TABLET BY MOUTH DAILY 4/11/22   Ar Automatic Reconciliation       Hospital Medications:  Current Facility-Administered Medications   Medication Dose Route Frequency    [Held by provider] amLODIPine (NORVASC) tablet 5 mg  5 mg Oral Daily    [Held by provider] carvedilol (COREG) tablet 25 mg  25 mg Oral BID WC    [Held by provider] lisinopril (PRINIVIL;ZESTRIL) tablet 40 mg  40 mg Oral Daily    rosuvastatin (CRESTOR) tablet 20 mg  20 mg Oral Daily    pantoprazole (PROTONIX) 40 mg in sodium chloride (PF) 0.9 % 10 mL injection  40 mg IntraVENous Daily    sodium chloride flush 0.9 % injection 5-40 mL  5-40 mL IntraVENous 2 times per day    sodium chloride flush 0.9 % injection 5-40 mL  5-40 mL IntraVENous PRN    0.9 % sodium chloride infusion   IntraVENous PRN    ondansetron (ZOFRAN-ODT) disintegrating tablet 4 mg  4 mg Oral Q8H PRN    Or    ondansetron (ZOFRAN) injection 4 mg  4 mg IntraVENous Q6H PRN    polyethylene glycol (GLYCOLAX) packet 17 g  17 g Oral Daily PRN    acetaminophen (TYLENOL) tablet 650 mg  650 mg Oral Q6H PRN    Or    acetaminophen (TYLENOL) suppository 650 mg  650 mg Rectal Q6H PRN    0.9 % sodium chloride infusion   IntraVENous PRN     Current Outpatient Medications   Medication Sig    pantoprazole (PROTONIX) 40 MG tablet Take 1 tablet by mouth 2 times daily (before meals)    carvedilol (COREG) 25 MG tablet TAKE 1 TABLET BY MOUTH TWICE DAILY WITH MEALS    lisinopril (PRINIVIL;ZESTRIL) 40 MG tablet Take 1 tablet by mouth daily    amLODIPine (NORVASC) 5 MG tablet TAKE 1 TABLET BY MOUTH DAILY    aspirin 81 MG EC tablet Take 81 mg by mouth daily    vitamin D3 (CHOLECALCIFEROL) 125 MCG (5000 UT) TABS tablet Take 5,000 Units by mouth daily    ferrous sulfate (FE TABS 325) 325 (65 Fe) MG EC tablet Take 325 mg by mouth 2 times daily (Patient not taking: Reported on 2022)    rosuvastatin (CRESTOR) 20 MG tablet TAKE 1 TABLET BY MOUTH DAILY       Social History:  Social History     Tobacco Use    Smoking status: Former     Packs/day: 0.25     Types: Cigarettes     Start date: 1975     Quit date: 2009     Years since quittin.5    Smokeless tobacco: Never    Tobacco comments:     Quit smokin09   Substance Use Topics    Alcohol use: No       Pt denies any history of drug use, blood transfusions, or tattoos. Family History:  Family History   Problem Relation Age of Onset    Heart Surgery Brother     Heart Disease Sister     Heart Disease Father     Heart Attack Father     Breast Cancer Neg Hx     Coronary Art Dis Other     No Known Problems Mother     Heart Disease Brother        Review of Systems:  A detailed 10 system ROS is obtained, with pertinent positives as listed above. All others are negative.     Diet:  NPO    Objective:     Physical Exam:  Vitals:  BP (!) 109/58   Pulse 81 Temp 98.2 °F (36.8 °C) (Oral)   Resp 26   Ht 5' 5\" (1.651 m)   Wt 150 lb (68 kg)   SpO2 99%   BMI 24.96 kg/m²   Gen:  Pt is alert, cooperative, no acute distress  Skin:  Extremities and face reveal no rashes. HEENT: Sclerae anicteric. Extra-occular muscles are intact. No oral ulcers. No abnormal pigmentation of the lips. The neck is supple. Cardiovascular: Regular rate and rhythm.  + murmur  Respiratory:  Comfortable breathing with no accessory muscle use. Clear breath sounds anteriorly with no wheezes, rales, or rhonchi. GI:  Abdomen nondistended, soft, and nontender. Normal active bowel sounds. No enlargement of the liver or spleen. No masses palpable. Rectal:  Deferred  Musculoskeletal:  No pitting edema of the lower legs. Neurological:  Gross memory appears intact. Patient is alert and oriented. Psychiatric:  Mood appears appropriate with judgement intact.       Laboratory:    Recent Labs     01/03/23  0837 01/03/23  2105 01/04/23  0330 01/04/23  0709   WBC 6.7 7.0  --   --    HGB 4.4* 4.3* 4.9*  --    HCT 17.6* 16.1* 18.4*  --     394  --   --    MCV 71.0* 67.9*  --   --     141  --  144*   K 3.3* 3.1*  --  3.0*    107  --  112*   CO2 26 27  --  25   BUN 12 13  --  10   CREATININE 1.00 1.00  --  0.80   CALCIUM 9.2 9.4  --  8.4   MG  --   --   --  2.1   GLUCOSE 98 116*  --  103*   ALKPHOS 50 51  --   --    AST 12* 11*  --   --    ALT 13 14  --   --    BILITOT 0.6 0.6  --   --    ALBUMIN 1.0 0.9  --   --    PROT 6.7 7.1  --   --    INR  --  1.2  --   --        Assessment:     Principal Problem:    Severe anemia  Active Problems:    Upper GI bleed    S/P CABG (coronary artery bypass graft)    Chronic systolic HF (heart failure) (HCC)    S/P mitral valve repair    Panlobular emphysema (HCC)    AICD (automatic cardioverter/defibrillator) present    Benign hypertensive heart disease with CHF (congestive heart failure) (HCC)  Resolved Problems:    * No resolved hospital problems. *   Patient is a 78 y.o. female with PMH including but not limited to CHF EF 35-40%, MV repair, ICD, CABG, and COPD who is seen in consultation at the request of Dr. Alexa Garza for anemia with h/o GIB. Patient presented to the ER with SOB and near syncopal episode. She has had weakness over the past 1-2 months. She was seen by her PCP who obtained blood work. She was told to come into the ER as her hgb was 4.4. Upon ER evaluation, hgb is 4.3. Seen in October by our service for GIB. Suspect upper GI bleed which could be due to peptic ulcer disease, AVM, Dieulafoy lesion, esophagitis, gastritis, or mass     Plan:     Supportive therapy  PPI  Monitor Hgb and transfuse to keep hgb >8  NPO  Hold any anticoagulation  Plan for EGD today    Agree, plan for EGD later today, given her profound anemia  Patient is seen and examined in collaboration with Dr. Nabeel Mendez. Assessment and plan as per Dr. Nabeel Mendez. [facility]

## 2023-01-05 LAB
ABO + RH BLD: NORMAL
ANION GAP SERPL CALC-SCNC: 2 MMOL/L (ref 2–11)
BASOPHILS # BLD: 0 K/UL (ref 0–0.2)
BASOPHILS NFR BLD: 1 % (ref 0–2)
BLD PROD TYP BPU: NORMAL
BLOOD BANK CMNT PATIENT-IMP: NORMAL
BLOOD BANK DISPENSE STATUS: NORMAL
BLOOD GROUP ANTIBODIES SERPL: NORMAL
BPU ID: NORMAL
BUN SERPL-MCNC: 6 MG/DL (ref 8–23)
CALCIUM SERPL-MCNC: 8.7 MG/DL (ref 8.3–10.4)
CHLORIDE SERPL-SCNC: 118 MMOL/L (ref 101–110)
CO2 SERPL-SCNC: 26 MMOL/L (ref 21–32)
CREAT SERPL-MCNC: 0.7 MG/DL (ref 0.6–1)
CROSSMATCH RESULT: NORMAL
DIFFERENTIAL METHOD BLD: ABNORMAL
EOSINOPHIL # BLD: 0.1 K/UL (ref 0–0.8)
EOSINOPHIL NFR BLD: 2 % (ref 0.5–7.8)
ERYTHROCYTE [DISTWIDTH] IN BLOOD BY AUTOMATED COUNT: 20.5 % (ref 11.9–14.6)
GLUCOSE SERPL-MCNC: 91 MG/DL (ref 65–100)
HCT VFR BLD AUTO: 26.8 % (ref 35.8–46.3)
HCT VFR BLD AUTO: 28.7 % (ref 35.8–46.3)
HCT VFR BLD AUTO: 29.1 % (ref 35.8–46.3)
HGB BLD-MCNC: 7.8 G/DL (ref 11.7–15.4)
HGB BLD-MCNC: 8.4 G/DL (ref 11.7–15.4)
HGB BLD-MCNC: 8.6 G/DL (ref 11.7–15.4)
IMM GRANULOCYTES # BLD AUTO: 0 K/UL (ref 0–0.5)
IMM GRANULOCYTES NFR BLD AUTO: 0 % (ref 0–5)
LYMPHOCYTES # BLD: 1.1 K/UL (ref 0.5–4.6)
LYMPHOCYTES NFR BLD: 15 % (ref 13–44)
MCH RBC QN AUTO: 22.1 PG (ref 26.1–32.9)
MCHC RBC AUTO-ENTMCNC: 29.6 G/DL (ref 31.4–35)
MCV RBC AUTO: 74.8 FL (ref 82–102)
MONOCYTES # BLD: 0.6 K/UL (ref 0.1–1.3)
MONOCYTES NFR BLD: 8 % (ref 4–12)
NEUTS SEG # BLD: 5.4 K/UL (ref 1.7–8.2)
NEUTS SEG NFR BLD: 74 % (ref 43–78)
NRBC # BLD: 0.04 K/UL (ref 0–0.2)
PLATELET # BLD AUTO: 276 K/UL (ref 150–450)
PMV BLD AUTO: 9.6 FL (ref 9.4–12.3)
POTASSIUM SERPL-SCNC: 4.3 MMOL/L (ref 3.5–5.1)
RBC # BLD AUTO: 3.89 M/UL (ref 4.05–5.2)
SODIUM SERPL-SCNC: 146 MMOL/L (ref 133–143)
SPECIMEN EXP DATE BLD: NORMAL
UNIT DIVISION: 0
WBC # BLD AUTO: 7.3 K/UL (ref 4.3–11.1)

## 2023-01-05 PROCEDURE — 97162 PT EVAL MOD COMPLEX 30 MIN: CPT

## 2023-01-05 PROCEDURE — 85025 COMPLETE CBC W/AUTO DIFF WBC: CPT

## 2023-01-05 PROCEDURE — 2580000003 HC RX 258: Performed by: STUDENT IN AN ORGANIZED HEALTH CARE EDUCATION/TRAINING PROGRAM

## 2023-01-05 PROCEDURE — 85014 HEMATOCRIT: CPT

## 2023-01-05 PROCEDURE — G0378 HOSPITAL OBSERVATION PER HR: HCPCS

## 2023-01-05 PROCEDURE — A4216 STERILE WATER/SALINE, 10 ML: HCPCS | Performed by: HOSPITALIST

## 2023-01-05 PROCEDURE — 6370000000 HC RX 637 (ALT 250 FOR IP): Performed by: FAMILY MEDICINE

## 2023-01-05 PROCEDURE — 2580000003 HC RX 258: Performed by: HOSPITALIST

## 2023-01-05 PROCEDURE — 80048 BASIC METABOLIC PNL TOTAL CA: CPT

## 2023-01-05 PROCEDURE — 96376 TX/PRO/DX INJ SAME DRUG ADON: CPT

## 2023-01-05 PROCEDURE — 97110 THERAPEUTIC EXERCISES: CPT

## 2023-01-05 PROCEDURE — 36415 COLL VENOUS BLD VENIPUNCTURE: CPT

## 2023-01-05 PROCEDURE — 6360000002 HC RX W HCPCS: Performed by: HOSPITALIST

## 2023-01-05 PROCEDURE — 2580000003 HC RX 258: Performed by: FAMILY MEDICINE

## 2023-01-05 PROCEDURE — 6360000002 HC RX W HCPCS: Performed by: STUDENT IN AN ORGANIZED HEALTH CARE EDUCATION/TRAINING PROGRAM

## 2023-01-05 PROCEDURE — C9113 INJ PANTOPRAZOLE SODIUM, VIA: HCPCS | Performed by: HOSPITALIST

## 2023-01-05 PROCEDURE — 1100000000 HC RM PRIVATE

## 2023-01-05 RX ADMIN — SODIUM CHLORIDE, PRESERVATIVE FREE 10 ML: 5 INJECTION INTRAVENOUS at 09:30

## 2023-01-05 RX ADMIN — SODIUM CHLORIDE 975 MG: 9 INJECTION, SOLUTION INTRAVENOUS at 15:41

## 2023-01-05 RX ADMIN — ROSUVASTATIN CALCIUM 20 MG: 20 TABLET, COATED ORAL at 09:58

## 2023-01-05 RX ADMIN — SODIUM CHLORIDE, PRESERVATIVE FREE 10 ML: 5 INJECTION INTRAVENOUS at 20:06

## 2023-01-05 RX ADMIN — SODIUM CHLORIDE, PRESERVATIVE FREE 40 MG: 5 INJECTION INTRAVENOUS at 20:06

## 2023-01-05 RX ADMIN — SODIUM CHLORIDE 25 MG: 9 INJECTION, SOLUTION INTRAVENOUS at 13:16

## 2023-01-05 RX ADMIN — SODIUM CHLORIDE, PRESERVATIVE FREE 40 MG: 5 INJECTION INTRAVENOUS at 09:58

## 2023-01-05 NOTE — PLAN OF CARE
Patient has remained A&O x 4. Patient denies pain, N/V/D. She states that she had 1 dark colored stool this AM, pt encouraged to call staff when she has a BM. No other bowel movements today. Patient educated on new medication protonix and Infed iron transfusion. HGB 8.6 overnight then 8.4 this AM. Pt tolerated test dosage well. Still currently receiving iron transfusion. Lab to come back after transfusion complete for H&H draw     Patient rounded on hourly by myself or patient assistant and encouraged to call with any needs. No signs of distress noted. Bed low, locked, call escobar within reach.    Aleida Adam RN    Problem: Chronic Conditions and Co-morbidities  Goal: Patient's chronic conditions and co-morbidity symptoms are monitored and maintained or improved  Outcome: Progressing     Problem: Safety - Adult  Goal: Free from fall injury  Outcome: Progressing     Problem: Respiratory - Adult  Goal: Achieves optimal ventilation and oxygenation  Outcome: Progressing     Problem: Cardiovascular - Adult  Goal: Maintains optimal cardiac output and hemodynamic stability  Outcome: Progressing  Goal: Absence of cardiac dysrhythmias or at baseline  Outcome: Progressing     Problem: Hematologic - Adult  Goal: Maintains hematologic stability  Outcome: Progressing

## 2023-01-05 NOTE — CARE COORDINATION
Case Management Assessment  Initial Evaluation    Date/Time of Evaluation: 1/5/2023 4:34 PM  Assessment Completed by: PEYTON Ling    If patient is discharged prior to next notation, then this note serves as note for discharge by case management. Patient Name: Ellis Hebert                   YOB: 1945  Diagnosis: General weakness [R53.1]  Severe anemia [D64.9]  Anemia, unspecified type [D64.9]                   Date / Time: 1/3/2023  8:49 PM    Patient Admission Status: Inpatient   Readmission Risk (Low < 19, Mod (19-27), High > 27): Readmission Risk Score: 15.4    Current PCP: TIANNA Arellano CNP  PCP verified by CM? Yes (Allison Toure NP)    Chart Reviewed: Yes      History Provided by: Patient, Medical Record  Patient Orientation: Alert and Oriented, Person, Place, Situation, Self    Patient Cognition: Alert    Hospitalization in the last 30 days (Readmission):  No    If yes, Readmission Assessment in CM Navigator will be completed. Advance Directives:      Code Status: Full Code   Patient's Primary Decision Maker is: Legal Next of Kin    Primary Decision MakerViraj Edward Child - [phone]    Primary Decision Maker: Darien Kim - Carl - [phone]    Secondary Decision Maker: Jero Matos - Brother/Sister - [phone]    Discharge Planning:    Patient lives with: Family Members Type of Home: House  Primary Care Giver: Self  Patient Support Systems include: (P) Family Members, Children   Current Financial resources: Medicaid, Medicare [facility] Medicare)  Current community resources:    Current services prior to admission: None            Current DME:              Type of Home Care services:  None    ADLS  Prior functional level: Independent in ADLs/IADLs  Current functional level: Independent in ADLs/IADLs    PT AM-PAC: 24 /24  OT AM-PAC:   /24    Family can provide assistance at DC:  Yes  Would you like Case Management to discuss the discharge plan with any other family members/significant others, and if so, who? No  Plans to Return to Present Housing: Yes  Other Identified Issues/Barriers to RETURNING to current housing: None  Potential Assistance needed at discharge: N/A            Potential DME:  None  Patient [facility] to [facility] to: [facility] for transportation at discharge:      Financial    Payor: Ankita Teague / Plan: [address] / Product Type: *No Product type* /     Does insurance require precert for SNF: Yes    Potential assistance Purchasing Medications: No      Rella Aw [address] [facility]  [address] [place] [id number]  Phone: [phone] Fax: [phone]      Notes:    Factors facilitating achievement of predicted outcomes: Family support, Cooperative, and Pleasant    Barriers to discharge: Medical complications    Additional Case Management Notes: 79 y/o female pt who resides with her sister Marisol Higgins [phone]. Pt has insurance with pharmacy benefits and a PCP. No current home DME or [facility] services. Pt is independent with ADLs except for driving. Pt's sister provides transportation. Pt has children who reside locally and are supportive. Pt states she refuses HH due to having desiree bulls in her home. PT has evaluated pt and does not recommend any further skilled therapy at d/c. No d/c needs identified at the present time. Anticipate d/c home in the next 24-48 hours if HgB is stable and pt is medically stable. CM will continue to follow and remain available if any needs arise. The Plan for Transition of Care is related to the following treatment goals of General weakness [R53.1]  Severe anemia [D64.9]  Anemia, unspecified type [O23.4]    IF APPLICABLE: The Patient and/or patient representative Daksha Riojas and her family were provided with a choice of provider and agrees with the discharge plan.  Freedom of choice list with basic dialogue that supports the patient's individualized plan of care/goals and shares the quality data associated with the providers was provided to: Patient   Patient Representative Name:       The Patient and/or Patient Representative Agree with the Discharge Plan?       PEYTON Ferguson  Case Management Department

## 2023-01-05 NOTE — PROGRESS NOTES
Hospitalist Progress Note   Admit Date:  1/3/2023  8:49 PM   Name:  Rah Jeong   Age:  78 y.o. Sex:  female  :  1945   MRN:  [id number]   Room:  [facility] [place]    Presenting Complaint: Abnormal Lab     Reason(s) for Admission: General weakness [R53.1]  Severe anemia [D64.9]  Anemia, unspecified type [D64.9]     Hospital Course:   Patient is a 77 y/o female with medical history of CAD s/p CABG, chronic systolic heart failure s/p AICD, MVR s/p repair, COPD, hx of GIB who presented to ED as referral from PCP for abnormal blood work, Hgb 4.4. Patient reports SOB and near syncopal symptoms, progressive of 1-2 month duration. ED workup: BP 98/85, afebrile, 98% on RA, no evidence of hypoxia  Labs notable for K 3.1, Hgb 4.3, Hct 16.1  CXR no acute process  Transfusion ordered by ED; patient received 3 U PRBC. Hospitalist consulted for admission with GI consultation placed. Patient underwent EGD  with findings of esophagitis, no stigmata of recent GIB. Considering capsule endoscopy vs colonoscopy. Subjective & 24hr Events (23): Patient alert and oriented. Denies chest pain, SOB, abdominal pain. She is feeling much better today. She is open to working with therapy but not open to therapies on discharge due to having dogs in home/no transportation to outpatient therapy.     Assessment & Plan:     Severe anemia requiring transfusion  History of iron deficiency anemia  -Not on antiplatelets, anticoagulants  -Hgb 4.3 on admission s/p 3 U PRBC transfusion  -Hgb currently 8.6, continue to monitor Hgb and transfuse to keep Hgb > 8.0 given cardiac history  -GI following to evaluate for GIB  -Anemia panel reviewed with iron 13, ferritin 4, tsat 7, give IV Infed today    Upper GI bleed  -Cont PPI  -Monitor Hgb and transfuse to keep Hgb > 8.0 given cardiac history  -s/p EGD  with findings of esophagitis, no stigmata of recent GIB  -Considering capsule endoscopy vs colonoscopy  -GI following, appreciate recommendations    Near syncopal symptoms  -Hold BP medications, IVF to maintain BP   -Consult PT/OT    Chronic systolic HF s/p AICD / CAD s/p CABG / Hx of MVR repair  -Home meds held in setting of borderline BP and concern for acute GIB  -Strict I&O's, daily weights    Panlobular emphysema (HCC)  -Noted, respiratory status currently stable on RA    Hypertension  -BP medications held in setting of low-norm BP, resume as needed    Hypokalemia, resolved    Discharge planning: home     Diet:  ADULT DIET; Clear Liquid  DVT PPx: SCD's  Code status: Full Code    Hospital Problems:  Principal Problem:    Severe anemia  Active Problems:    Upper GI bleed    S/P CABG (coronary artery bypass graft)    Chronic systolic HF (heart failure) (HCC)    S/P mitral valve repair    Iron deficiency anemia due to chronic blood loss    Panlobular emphysema (HCC)    AICD (automatic cardioverter/defibrillator) present    Pure hypercholesterolemia    Coronary atherosclerosis of native coronary artery    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  *      Objective:   Patient Vitals for the past 24 hrs:   Temp Pulse Resp BP SpO2   01/05/23 0304 98.8 °F (37.1 °C) 87 17 (!) 111/58 96 %   01/05/23 0015 98 °F (36.7 °C) 89 21 (!) 112/53 96 %   01/04/23 2221 97.9 °F (36.6 °C) 89 22 117/66 96 %   01/04/23 2148 98.4 °F (36.9 °C) 92 24 126/65 96 %   01/04/23 1905 97.7 °F (36.5 °C) 91 21 (!) 118/54 100 %   01/04/23 1710 97.5 °F (36.4 °C) 90 22 (!) 118/55 100 %   01/04/23 1645 -- 99 22 (!) 120/56 98 %   01/04/23 1643 -- 87 20 (!) 113/57 98 %   01/04/23 1630 -- 92 19 (!) 116/58 100 %   01/04/23 1622 -- 90 20 (!) 112/58 98 %   01/04/23 1309 98.8 °F (37.1 °C) 88 16 131/60 97 %   01/04/23 1215 -- 84 26 115/66 99 %   01/04/23 1200 -- 87 27 (!) 119/52 98 %   01/04/23 1145 -- 85 27 (!) 107/51 98 %   01/04/23 1130 -- 83 24 (!) 107/51 97 %   01/04/23 1000 -- 82 26 (!) 121/56 97 %   01/04/23 0945 -- 82 23 115/61 99 %   01/04/23 0930 -- 76 13 (!) 114/55 100 %   01/04/23 0915 -- 78 24 (!) 115/57 100 %   01/04/23 0900 -- 80 23 (!) 109/53 98 %   01/04/23 0815 -- 86 23 110/65 98 %   01/04/23 0806 98.4 °F (36.9 °C) 81 26 (!) 109/58 99 %   01/04/23 0800 -- 81 26 (!) 109/58 99 %   01/04/23 0758 -- 80 26 (!) 109/58 100 %   01/04/23 0745 -- 83 22 138/64 100 %       Oxygen Therapy  SpO2: 96 %  Pulse via Oximetry: 82 beats per minute  Pulse Oximeter Device Mode: Continuous  Pulse Oximeter Device Location: Right, Finger  O2 Device: None (Room air)  O2 Flow Rate (L/min): 3 L/min    Estimated body mass index is 23.52 kg/m² as calculated from the following:    Height as of this encounter: 5' 5\" (1.651 m). Weight as of this encounter: 141 lb 5 oz (64.1 kg). Intake/Output Summary (Last 24 hours) at 1/5/2023 0724  Last data filed at 1/5/2023 0015  Gross per 24 hour   Intake 720 ml   Output 0 ml   Net 720 ml         Physical Exam:     Blood pressure (!) 111/58, pulse 87, temperature 98.8 °F (37.1 °C), temperature source Oral, resp. rate 17, height 5' 5\" (1.651 m), weight 141 lb 5 oz (64.1 kg), SpO2 96 %. General:    Alert, frail, no acute distress  Head:  Normocephalic, atraumatic  Eyes:  Sclerae appear normal.  Pupils equally round. ENT:  Nares appear normal.  Moist oral mucosa  Neck:  No restricted ROM. Trachea midline   CV:   RRR. No m/r/g. No jugular venous distension. Lungs:   CTAB. No wheezing, rhonchi. Respirations even and unlabored on RA  Abdomen:   Soft, nontender, nondistended. Bowel sounds normoactive. Extremities: No cyanosis or clubbing. No edema  Skin:     No rashes and normal coloration. Warm and dry. Neuro:  CN II-XII grossly intact. Sensation intact. A&O x 4. Psych:  Normal mood and affect.       I have personally reviewed labs and tests showing:  Recent Labs:  Recent Results (from the past 48 hour(s))   TSH    Collection Time: 01/03/23  8:37 AM   Result Value Ref Range    TSH, 3RD GENERATION 2.280 0.358 - 3.740 uIU/mL   Vitamin D 25 Hydroxy    Collection Time: 01/03/23  8:37 AM   Result Value Ref Range    Vit D, 25-Hydroxy 37.3 30.0 - 100.0 ng/mL   Lipid Panel    Collection Time: 01/03/23  8:37 AM   Result Value Ref Range    Cholesterol, Total 73 <200 MG/DL    Triglycerides 73 35 - 150 MG/DL    HDL 35 (L) 40 - 60 MG/DL    LDL Calculated 23.4 <100 MG/DL    VLDL Cholesterol Calculated 14.6 6.0 - 23.0 MG/DL    Chol/HDL Ratio 2.1     Comprehensive Metabolic Panel    Collection Time: 01/03/23  8:37 AM   Result Value Ref Range    Sodium 142 133 - 143 mmol/L    Potassium 3.3 (L) 3.5 - 5.1 mmol/L    Chloride 110 101 - 110 mmol/L    CO2 26 21 - 32 mmol/L    Anion Gap 6 2 - 11 mmol/L    Glucose 98 65 - 100 mg/dL    BUN 12 8 - 23 MG/DL    Creatinine 1.00 0.6 - 1.0 MG/DL    Est, Glom Filt Rate 59 (L) >60 ml/min/1.73m2    Calcium 9.2 8.3 - 10.4 MG/DL    Total Bilirubin 0.6 0.2 - 1.1 MG/DL    ALT 13 12 - 65 U/L    AST 12 (L) 15 - 37 U/L    Alk Phosphatase 50 50 - 136 U/L    Total Protein 6.7 6.3 - 8.2 g/dL    Albumin 3.3 3.2 - 4.6 g/dL    Globulin 3.4 2.8 - 4.5 g/dL    Albumin/Globulin Ratio 1.0 0.4 - 1.6     CBC with Auto Differential    Collection Time: 01/03/23  8:37 AM   Result Value Ref Range    WBC 6.7 4.3 - 11.1 K/uL    RBC 2.48 (L) 4.05 - 5.2 M/uL    Hemoglobin 4.4 (LL) 11.7 - 15.4 g/dL    Hematocrit 17.6 (L) 35.8 - 46.3 %    MCV 71.0 (L) 82 - 102 FL    MCH 17.7 (L) 26.1 - 32.9 PG    MCHC 25.0 (L) 31.4 - 35.0 g/dL    RDW 21.6 (H) 11.9 - 14.6 %    Platelets 605 197 - 143 K/uL    MPV 10.4 9.4 - 12.3 FL    nRBC 0.07 0.0 - 0.2 K/uL    Differential Type AUTOMATED      Seg Neutrophils 75 43 - 78 %    Lymphocytes 16 13 - 44 %    Monocytes 8 4.0 - 12.0 %    Eosinophils % 1 0.5 - 7.8 %    Basophils 1 0.0 - 2.0 %    Immature Granulocytes 0 0.0 - 5.0 %    Segs Absolute 5.0 1.7 - 8.2 K/UL    Absolute Lymph # 1.1 0.5 - 4.6 K/UL    Absolute Mono # 0.5 0.1 - 1.3 K/UL    Absolute Eos # 0.1 0.0 - 0.8 K/UL    Basophils Absolute 0.0 0.0 - 0.2 K/UL    Absolute Immature Granulocyte 0.0 0.0 - 0.5 K/UL   PREPARE RBC (CROSSMATCH), 2 Units    Collection Time: 01/03/23  9:00 PM   Result Value Ref Range    History Check Historical check performed    CBC with Auto Differential    Collection Time: 01/03/23  9:05 PM   Result Value Ref Range    WBC 7.0 4.3 - 11.1 K/uL    RBC 2.37 (L) 4.05 - 5.2 M/uL    Hemoglobin 4.3 (LL) 11.7 - 15.4 g/dL    Hematocrit 16.1 (L) 35.8 - 46.3 %    MCV 67.9 (L) 82 - 102 FL    MCH 18.1 (L) 26.1 - 32.9 PG    MCHC 26.7 (L) 31.4 - 35.0 g/dL    RDW 21.3 (H) 11.9 - 14.6 %    Platelets 184 864 - 217 K/uL    MPV 9.9 9.4 - 12.3 FL    nRBC 0.05 0.0 - 0.2 K/uL    Differential Type AUTOMATED      Seg Neutrophils 67 43 - 78 %    Lymphocytes 20 13 - 44 %    Monocytes 10 4.0 - 12.0 %    Eosinophils % 1 0.5 - 7.8 %    Basophils 0 0.0 - 2.0 %    Immature Granulocytes 2 0.0 - 5.0 %    Segs Absolute 4.7 1.7 - 8.2 K/UL    Absolute Lymph # 1.4 0.5 - 4.6 K/UL    Absolute Mono # 0.7 0.1 - 1.3 K/UL    Absolute Eos # 0.1 0.0 - 0.8 K/UL    Basophils Absolute 0.0 0.0 - 0.2 K/UL    Absolute Immature Granulocyte 0.1 0.0 - 0.5 K/UL   CMP    Collection Time: 01/03/23  9:05 PM   Result Value Ref Range    Sodium 141 133 - 143 mmol/L    Potassium 3.1 (L) 3.5 - 5.1 mmol/L    Chloride 107 101 - 110 mmol/L    CO2 27 21 - 32 mmol/L    Anion Gap 7 2 - 11 mmol/L    Glucose 116 (H) 65 - 100 mg/dL    BUN 13 8 - 23 MG/DL    Creatinine 1.00 0.6 - 1.0 MG/DL    Est, Glom Filt Rate 59 (L) >60 ml/min/1.73m2    Calcium 9.4 8.3 - 10.4 MG/DL    Total Bilirubin 0.6 0.2 - 1.1 MG/DL    ALT 14 12 - 65 U/L    AST 11 (L) 15 - 37 U/L    Alk Phosphatase 51 50 - 136 U/L    Total Protein 7.1 6.3 - 8.2 g/dL    Albumin 3.3 3.2 - 4.6 g/dL    Globulin 3.8 2.8 - 4.5 g/dL    Albumin/Globulin Ratio 0.9 0.4 - 1.6     Protime-INR    Collection Time: 01/03/23  9:05 PM   Result Value Ref Range    Protime 15.6 (H) 12.6 - 14.3 sec    INR 1.2     TYPE AND SCREEN Collection Time: 01/03/23  9:05 PM   Result Value Ref Range    Crossmatch expiration date 01/06/2023,2359     ABO/Rh O POSITIVE     Antibody Screen NEG     Blood Bank Comment       CALLED ER AT 2248 BLOOD READY 1.3.23 [facility]  CALLED ER DESK AT 1034 BLOOD READY 1.4.23 [facility]      Unit Number [id number]     Product Code Blood Bank RC LR     Unit Divison 00     Dispense Status Blood Bank TRANSFUSED     Crossmatch Result Compatible     Unit Number [id number]     Product Code Blood Bank RC LR     Unit Divison 00     Dispense Status Blood Bank ISSUED     Crossmatch Result Compatible     Unit Number [id number]     Product Code Blood Bank RC LR     Unit Divison 00     Dispense Status Blood Bank ISSUED     Crossmatch Result Compatible    Brain Natriuretic Peptide    Collection Time: 01/03/23  9:05 PM   Result Value Ref Range    NT Pro-BNP 1,432 (H) <450 PG/ML   Troponin    Collection Time: 01/03/23  9:05 PM   Result Value Ref Range    Troponin, High Sensitivity 16.2 (H) 0 - 14 pg/mL   EKG 12 Lead (Select if Upper Abdominal Pain or symptoms of SOB,or Tachycardia)    Collection Time: 01/03/23  9:05 PM   Result Value Ref Range    Ventricular Rate 85 BPM    QRS Duration 114 ms    Q-T Interval 380 ms    QTc Calculation (Bazett) 452 ms    R Axis 39 degrees    T Axis 101 degrees    Diagnosis Normal sinus rhythm or ectopic atrial rhythm    Hemoglobin and Hematocrit    Collection Time: 01/04/23  3:30 AM   Result Value Ref Range    Hemoglobin 4.9 (LL) 11.7 - 15.4 g/dL    Hematocrit 18.4 (L) 35.8 - 46.3 %   Basic Metabolic Panel w/ Reflex to MG    Collection Time: 01/04/23  7:09 AM   Result Value Ref Range    Sodium 144 (H) 133 - 143 mmol/L    Potassium 3.0 (L) 3.5 - 5.1 mmol/L    Chloride 112 (H) 101 - 110 mmol/L    CO2 25 21 - 32 mmol/L    Anion Gap 7 2 - 11 mmol/L    Glucose 103 (H) 65 - 100 mg/dL    BUN 10 8 - 23 MG/DL    Creatinine 0.80 0.6 - 1.0 MG/DL    Est, Glom Filt Rate >60 >60 ml/min/1.73m2    Calcium 8.4 8.3 - 10.4 MG/DL Magnesium    Collection Time: 01/04/23  7:09 AM   Result Value Ref Range    Magnesium 2.1 1.8 - 2.4 mg/dL   CBC with Auto Differential    Collection Time: 01/04/23  7:45 AM   Result Value Ref Range    WBC 6.0 4.3 - 11.1 K/uL    RBC 3.09 (L) 4.05 - 5.2 M/uL    Hemoglobin 6.7 (LL) 11.7 - 15.4 g/dL    Hematocrit 23.0 (L) 35.8 - 46.3 %    MCV 74.4 (L) 82 - 102 FL    MCH 21.7 (L) 26.1 - 32.9 PG    MCHC 29.1 (L) 31.4 - 35.0 g/dL    RDW 21.6 (H) 11.9 - 14.6 %    Platelets 191 679 - 023 K/uL    MPV 9.8 9.4 - 12.3 FL    nRBC 0.03 0.0 - 0.2 K/uL    Differential Type AUTOMATED      Seg Neutrophils 72 43 - 78 %    Lymphocytes 17 13 - 44 %    Monocytes 9 4.0 - 12.0 %    Eosinophils % 1 0.5 - 7.8 %    Basophils 1 0.0 - 2.0 %    Immature Granulocytes 1 0.0 - 5.0 %    Segs Absolute 4.3 1.7 - 8.2 K/UL    Absolute Lymph # 1.0 0.5 - 4.6 K/UL    Absolute Mono # 0.6 0.1 - 1.3 K/UL    Absolute Eos # 0.1 0.0 - 0.8 K/UL    Basophils Absolute 0.0 0.0 - 0.2 K/UL    Absolute Immature Granulocyte 0.0 0.0 - 0.5 K/UL   PREPARE RBC (CROSSMATCH), 1 Units    Collection Time: 01/04/23  9:30 AM   Result Value Ref Range    History Check Historical check performed    Iron    Collection Time: 01/04/23 10:18 AM   Result Value Ref Range    Iron 13 (L) 35 - 150 ug/dL   Ferritin    Collection Time: 01/04/23 10:18 AM   Result Value Ref Range    Ferritin 4 (L) 8 - 388 NG/ML   Hemoglobin and Hematocrit    Collection Time: 01/04/23 11:25 AM   Result Value Ref Range    Hemoglobin 7.2 (L) 11.7 - 15.4 g/dL    Hematocrit 24.2 (L) 35.8 - 46.3 %   Hemoglobin and Hematocrit    Collection Time: 01/04/23  6:23 PM   Result Value Ref Range    Hemoglobin 7.7 (L) 11.7 - 15.4 g/dL    Hematocrit 27.3 (L) 35.8 - 46.3 %   Basic Metabolic Panel w/ Reflex to MG    Collection Time: 01/05/23  2:04 AM   Result Value Ref Range    Sodium 146 (H) 133 - 143 mmol/L    Potassium 4.3 3.5 - 5.1 mmol/L    Chloride 118 (H) 101 - 110 mmol/L    CO2 26 21 - 32 mmol/L    Anion Gap 2 2 - 11 mmol/L    Glucose 91 65 - 100 mg/dL    BUN 6 (L) 8 - 23 MG/DL    Creatinine 0.70 0.6 - 1.0 MG/DL    Est, Glom Filt Rate >60 >60 ml/min/1.73m2    Calcium 8.7 8.3 - 10.4 MG/DL   CBC with Auto Differential    Collection Time: 01/05/23  2:04 AM   Result Value Ref Range    WBC 7.3 4.3 - 11.1 K/uL    RBC 3.89 (L) 4.05 - 5.2 M/uL    Hemoglobin 8.6 (L) 11.7 - 15.4 g/dL    Hematocrit 29.1 (L) 35.8 - 46.3 %    MCV 74.8 (L) 82 - 102 FL    MCH 22.1 (L) 26.1 - 32.9 PG    MCHC 29.6 (L) 31.4 - 35.0 g/dL    RDW 20.5 (H) 11.9 - 14.6 %    Platelets 230 677 - 985 K/uL    MPV 9.6 9.4 - 12.3 FL    nRBC 0.04 0.0 - 0.2 K/uL    Differential Type AUTOMATED      Seg Neutrophils 74 43 - 78 %    Lymphocytes 15 13 - 44 %    Monocytes 8 4.0 - 12.0 %    Eosinophils % 2 0.5 - 7.8 %    Basophils 1 0.0 - 2.0 %    Immature Granulocytes 0 0.0 - 5.0 %    Segs Absolute 5.4 1.7 - 8.2 K/UL    Absolute Lymph # 1.1 0.5 - 4.6 K/UL    Absolute Mono # 0.6 0.1 - 1.3 K/UL    Absolute Eos # 0.1 0.0 - 0.8 K/UL    Basophils Absolute 0.0 0.0 - 0.2 K/UL    Absolute Immature Granulocyte 0.0 0.0 - 0.5 K/UL       I have personally reviewed imaging studies showing: Other Studies:  XR CHEST PORTABLE   Final Result   No acute process.           Current Meds:  Current Facility-Administered Medications   Medication Dose Route Frequency    [Held by provider] amLODIPine (NORVASC) tablet 5 mg  5 mg Oral Daily    [Held by provider] carvedilol (COREG) tablet 25 mg  25 mg Oral BID WC    [Held by provider] lisinopril (PRINIVIL;ZESTRIL) tablet 40 mg  40 mg Oral Daily    rosuvastatin (CRESTOR) tablet 20 mg  20 mg Oral Daily    sodium chloride flush 0.9 % injection 5-40 mL  5-40 mL IntraVENous 2 times per day    sodium chloride flush 0.9 % injection 5-40 mL  5-40 mL IntraVENous PRN    0.9 % sodium chloride infusion   IntraVENous PRN    ondansetron (ZOFRAN-ODT) disintegrating tablet 4 mg  4 mg Oral Q8H PRN    Or    ondansetron (ZOFRAN) injection 4 mg  4 mg IntraVENous Q6H PRN polyethylene glycol (GLYCOLAX) packet 17 g  17 g Oral Daily PRN    acetaminophen (TYLENOL) tablet 650 mg  650 mg Oral Q6H PRN    Or    acetaminophen (TYLENOL) suppository 650 mg  650 mg Rectal Q6H PRN    0.9 % sodium chloride infusion   IntraVENous PRN    pantoprazole (PROTONIX) 40 mg in sodium chloride (PF) 0.9 % 10 mL injection  40 mg IntraVENous Q12H    0.9 % sodium chloride infusion   IntraVENous PRN     Signed: Abby RUANO-BC

## 2023-01-05 NOTE — THERAPY EVALUATION
PHYSICAL THERAPY Initial Assessment  (Link to Caseload Tracking:    Acknowledge Orders  Time In/Out  PT Charge Capture  Rehab Caseload Tracker    Attila Roman is a 78 y.o. female   PRIMARY DIAGNOSIS: Severe anemia  General weakness [R53.1]  Severe anemia [D64.9]  Anemia, unspecified type [D64.9]  Procedure(s) (LRB):  EGD ESOPHAGOGASTRODUODENOSCOPY ER 3/ assigned 5th floor *HGB recheck (N/A)  1 Day Post-Op  Reason for Referral: Generalized Muscle Weakness (M62.81)  Other lack of cordination (R27.8)  Other abnormalities of gait and mobility (R26.89)  Inpatient: Payor: Donald Cervantes / Plan: Judith Valenzuela OF [place] MEDICARE HMO/PPO / Product Type: *No Product type* /     ASSESSMENT:     REHAB RECOMMENDATIONS:   Recommendation to date pending progress:  Setting:  No further skilled therapy after discharge from hospital    Equipment:    None     ASSESSMENT:  Ms. Francisco Javier Tyler pt is a 77 yo female who was seen in the ED for severe anemia, pt upon PT eval was distant supervision with all bed mobilites and transfers, pt was able to ambulate 250' no AD with distant supervision, no SOB/LOB or CV episodes noted during and throughout therapy eval, DC reccommendations, no further skilled PT services after discharge from hospital.     [address] Mobility Inpatient   How much difficulty turning over in bed?: None  How much difficulty sitting down on / standing up from a chair with arms?: None  How much difficulty moving from lying on back to sitting on side of bed?: None  How much help from another person moving to and from a bed to a chair?: None  How much help from another person needed to walk in hospital room?: None  How much help from another person for climbing 3-5 steps with a railing?: None  AM-PAC Inpatient Mobility Raw Score : 24  AM-PAC Inpatient T-Scale Score : 61.14  Mobility Inpatient CMS 0-100% Score: 0  Mobility Inpatient CMS G-Code Modifier : [address] [place] [address]    SUBJECTIVE:   Ms. MENJIVAR states, \"I can work with PT \"     Social/Functional Lives With: Family  Type of Home: House  Home Layout: One level    OBJECTIVE:     PAIN: Zeenat Karen / O2: Kerrie Longoria / Severa Broom / Vel Mejia:   Pre Treatment:   Pain Assessment: None - Denies Pain      Post Treatment: n.a Vitals   Vitals  BP: 100/76  MAP (Calculated): 84  MAP (mmHg): 83  BP Location: Left upper arm  BP Upper/Lower: Upper  BP Method: Automatic  Patient Position: Supine  Level of Consciousness: Alert (0)    Oxygen  SpO2: 98 %   None    RESTRICTIONS/PRECAUTIONS:                    GROSS EVALUATION: Intact Impaired (Comments):   AROM [x]     PROM [x]    Strength [x]     Balance [x]     Posture [] Forward Head  Rounded Shoulders  Thoracic Kyphosis   Sensation [x]     Coordination [x]      Tone [x]     Edema [x]    Activity Tolerance [x]      []      COGNITION/  PERCEPTION: Intact Impaired (Comments):   Orientation [x]     Vision [x]     Hearing [x]     Cognition  [x]       MOBILITY: I Mod I S SBA CGA Min Mod Max Total  NT x2 Comments:   Bed Mobility    Rolling [] [] [x] [] [] [] [] [] [] [] []    Supine to Sit [] [] [x] [] [] [] [] [] [] [] []    Scooting [] [] [x] [] [] [] [] [] [] [] []    Sit to Supine [] [] [x] [] [] [] [] [] [] [] []    Transfers    Sit to Stand [] [] [x] [] [] [] [] [] [] [] []    Bed to Chair [] [] [x] [] [] [] [] [] [] [] []    Stand to Sit [] [] [x] [] [] [] [] [] [] [] []     [] [] [] [] [] [] [] [] [] [] []    I=Independent, Mod I=Modified Independent, S=Supervision, SBA=Standby Assistance, CGA=Contact Guard Assistance,   Min=Minimal Assistance, Mod=Moderate Assistance, Max=Maximal Assistance, Total=Total Assistance, NT=Not Tested    GAIT: I Mod I S SBA CGA Min Mod Max Total  NT x2 Comments:   Level of Assistance [] [] [x] [] [] [] [] [] [] [] []    Distance 250 feet    DME None    Gait Quality Decreased suellen , Decreased step clearance, Decreased step length, and Decreased stance    Weightbearing Status Stairs      I=Independent, Mod I=Modified Independent, S=Supervision, SBA=Standby Assistance, CGA=Contact Guard Assistance,   Min=Minimal Assistance, Mod=Moderate Assistance, Max=Maximal Assistance, Total=Total Assistance, NT=Not Tested    PLAN:   ACUTE PHYSICAL THERAPY GOALS:   (Developed with and agreed upon by patient and/or caregiver. )      FREQUENCY AND DURATION:   for duration of hospital stay or until stated goals are met, whichever comes first.    THERAPY PROGNOSIS: Excellent    PROBLEM LIST:   (Skilled intervention is medically necessary to address:)  Decreased Activity Tolerance  Decreased Gait Ability  Decreased Strength INTERVENTIONS PLANNED:   (Benefits and precautions of physical therapy have been discussed with the patient.)  Therapeutic Activity  Therapeutic Exercise/HEP  Neuromuscular Re-education  Gait Training  Manual Therapy       TREATMENT:   EVALUATION: MODERATE COMPLEXITY: (Untimed Charge)    TREATMENT:   Therapeutic Exercise (10 Minutes): Therapeutic exercises noted below to improve functional activity tolerance, AROM, strength, and mobility.      TREATMENT GRID:   Date:  1/5/23   Date:   Date:     Activity/Exercise Parameters Parameters Parameters   Ambulation 250' distant supervision no AD 10 mins                                              AFTER TREATMENT PRECAUTIONS: RN notified    INTERDISCIPLINARY COLLABORATION:  PT/ PTA    EDUCATION: Education Given To: Patient  Education Provided: Role of Therapy;Plan of Care    TIME IN/OUT:  Time In: 1020  Time Out: [address]  Minutes: KULWINDER Jauregui

## 2023-01-05 NOTE — PROGRESS NOTES
[facility] Progress Note         Admit Date:  1/3/2023    Today's Date:  1/5/2023    CC:  Anemia with h/o GIB    Subjective:     Patient without any significant changes. + bowel movement. Denies melena or hematochezia . Tolerating clear liquids well    EGD yesterday as below:    DATE of PROCEDURE: 1/4/2023     INDICATION: Anemia, Hx GI Bleed     POSTPROCEDURE DIAGNOSIS: esophagitis     FINDINGS:  ESOPHAGUS: normal proximally, distal LA class A esophagitis  STOMACH:Normal  DUODENUM: Scarred duodenal bulb     NO STIGMATA OF RECENT GI BLEEDING     Estimated blood loss: 0-minimal   Specimens obtained during procedure: none     PLAN: Serial hgb, Consider capsule endoscopy vs Colonoscopy      Medications:   Current Facility-Administered Medications   Medication Dose Route Frequency    [Held by provider] amLODIPine (NORVASC) tablet 5 mg  5 mg Oral Daily    [Held by provider] carvedilol (COREG) tablet 25 mg  25 mg Oral BID WC    [Held by provider] lisinopril (PRINIVIL;ZESTRIL) tablet 40 mg  40 mg Oral Daily    rosuvastatin (CRESTOR) tablet 20 mg  20 mg Oral Daily    sodium chloride flush 0.9 % injection 5-40 mL  5-40 mL IntraVENous 2 times per day    sodium chloride flush 0.9 % injection 5-40 mL  5-40 mL IntraVENous PRN    0.9 % sodium chloride infusion   IntraVENous PRN    ondansetron (ZOFRAN-ODT) disintegrating tablet 4 mg  4 mg Oral Q8H PRN    Or    ondansetron (ZOFRAN) injection 4 mg  4 mg IntraVENous Q6H PRN    polyethylene glycol (GLYCOLAX) packet 17 g  17 g Oral Daily PRN    acetaminophen (TYLENOL) tablet 650 mg  650 mg Oral Q6H PRN    Or    acetaminophen (TYLENOL) suppository 650 mg  650 mg Rectal Q6H PRN    0.9 % sodium chloride infusion   IntraVENous PRN    pantoprazole (PROTONIX) 40 mg in sodium chloride (PF) 0.9 % 10 mL injection  40 mg IntraVENous Q12H    0.9 % sodium chloride infusion   IntraVENous PRN       Review of Systems:  ROS was obtained, with pertinent positives as listed above.   No chest pain or SOB. Diet:  clear liquids    Objective:   Vitals:  /76   Pulse 87   Temp 98.4 °F (36.9 °C) (Oral)   Resp 18   Ht 5' 5\" (1.651 m)   Wt 141 lb 5 oz (64.1 kg)   SpO2 98%   BMI 23.52 kg/m²   Intake/Output:  No intake/output data recorded. 01/03 1901 - 01/05 0700  In: 1030 [I.V.:100]  Out: 0   Exam:  General appearance: alert, cooperative, no distress  Lungs: clear to auscultation bilaterally anteriorly  Heart: regular rate and rhythm  Abdomen: soft, non-tender.  Bowel sounds normal.  Extremities: extremities normal, atraumatic, no cyanosis or edema  Neuro:  alert and oriented    Data Review (Labs):    Recent Labs     01/03/23  0837 01/03/23  2105 01/04/23  0330 01/04/23  0709 01/04/23  0745 01/04/23  1125 01/04/23  1823 01/05/23  0204   WBC 6.7 7.0  --   --  6.0  --   --  7.3   HGB 4.4* 4.3* 4.9*  --  6.7* 7.2* 7.7* 8.6*   HCT 17.6* 16.1* 18.4*  --  23.0* 24.2* 27.3* 29.1*    394  --   --  283  --   --  276   MCV 71.0* 67.9*  --   --  74.4*  --   --  74.8*    141  --  144*  --   --   --  146*   K 3.3* 3.1*  --  3.0*  --   --   --  4.3    107  --  112*  --   --   --  118*   CO2 26 27  --  25  --   --   --  26   BUN 12 13  --  10  --   --   --  6*   CREATININE 1.00 1.00  --  0.80  --   --   --  0.70   CALCIUM 9.2 9.4  --  8.4  --   --   --  8.7   MG  --   --   --  2.1  --   --   --   --    GLUCOSE 98 116*  --  103*  --   --   --  91   ALKPHOS 50 51  --   --   --   --   --   --    AST 12* 11*  --   --   --   --   --   --    ALT 13 14  --   --   --   --   --   --    BILITOT 0.6 0.6  --   --   --   --   --   --    ALBUMIN 1.0 0.9  --   --   --   --   --   --    PROT 6.7 7.1  --   --   --   --   --   --    INR  --  1.2  --   --   --   --   --   --        Assessment:     Principal Problem:    Severe anemia  Active Problems:    Upper GI bleed    S/P CABG (coronary artery bypass graft)    Chronic systolic HF (heart failure) ([facility])    S/P mitral valve repair    Panlobular emphysema [facility])    AICD (automatic cardioverter/defibrillator) present    Benign hypertensive heart disease with CHF (congestive heart failure) ([address]  Resolved Problems:    * No resolved hospital problems. *  Patient is a 78 y.o. female with PMH including but not limited to CHF EF 35-40%, MV repair, ICD, CABG, and COPD who is seen in consultation at the request of Dr. Faisal Burkett for anemia with h/o GIB. Patient presented to the ER with SOB and near syncopal episode. She has had weakness over the past 1-2 months. She was seen by her PCP who obtained blood work. She was told to come into the ER as her hgb was 4.4. Upon ER evaluation, hgb is 4.3. Seen in October by our service for GIB. Plan:     Continue supportive therapy. Serial hgb and transfuse for hgb <8  Hold anticoagulation  If hgb continues to drop and suspect bleeding can prep tonight for colo tomorrow       Patient is seen and examined in collaboration with Dr. Sun Anderson. Assessment and plan as per Dr. Sun Anderson.

## 2023-01-06 VITALS
TEMPERATURE: 98 F | SYSTOLIC BLOOD PRESSURE: 101 MMHG | RESPIRATION RATE: 18 BRPM | BODY MASS INDEX: 23.54 KG/M2 | DIASTOLIC BLOOD PRESSURE: 69 MMHG | OXYGEN SATURATION: 94 % | HEIGHT: 65 IN | WEIGHT: 141.31 LBS | HEART RATE: 93 BPM

## 2023-01-06 PROBLEM — D64.9 SEVERE ANEMIA: Status: RESOLVED | Noted: 2023-01-03 | Resolved: 2023-01-06

## 2023-01-06 PROBLEM — K92.2 UPPER GI BLEED: Status: RESOLVED | Noted: 2022-10-27 | Resolved: 2023-01-06

## 2023-01-06 LAB
ANION GAP SERPL CALC-SCNC: 2 MMOL/L (ref 2–11)
BUN SERPL-MCNC: 4 MG/DL (ref 8–23)
CALCIUM SERPL-MCNC: 8.6 MG/DL (ref 8.3–10.4)
CHLORIDE SERPL-SCNC: 116 MMOL/L (ref 101–110)
CO2 SERPL-SCNC: 26 MMOL/L (ref 21–32)
CREAT SERPL-MCNC: 0.7 MG/DL (ref 0.6–1)
GLUCOSE SERPL-MCNC: 105 MG/DL (ref 65–100)
HCT VFR BLD AUTO: 28.2 % (ref 35.8–46.3)
HCT VFR BLD AUTO: 31.5 % (ref 35.8–46.3)
HGB BLD-MCNC: 8.2 G/DL (ref 11.7–15.4)
HGB BLD-MCNC: 9 G/DL (ref 11.7–15.4)
POTASSIUM SERPL-SCNC: 3.7 MMOL/L (ref 3.5–5.1)
SODIUM SERPL-SCNC: 144 MMOL/L (ref 133–143)

## 2023-01-06 PROCEDURE — 36415 COLL VENOUS BLD VENIPUNCTURE: CPT

## 2023-01-06 PROCEDURE — 6360000002 HC RX W HCPCS: Performed by: HOSPITALIST

## 2023-01-06 PROCEDURE — 2580000003 HC RX 258: Performed by: HOSPITALIST

## 2023-01-06 PROCEDURE — 6370000000 HC RX 637 (ALT 250 FOR IP): Performed by: FAMILY MEDICINE

## 2023-01-06 PROCEDURE — C9113 INJ PANTOPRAZOLE SODIUM, VIA: HCPCS | Performed by: HOSPITALIST

## 2023-01-06 PROCEDURE — 85018 HEMOGLOBIN: CPT

## 2023-01-06 PROCEDURE — 96376 TX/PRO/DX INJ SAME DRUG ADON: CPT

## 2023-01-06 PROCEDURE — G0378 HOSPITAL OBSERVATION PER HR: HCPCS

## 2023-01-06 PROCEDURE — 80048 BASIC METABOLIC PNL TOTAL CA: CPT

## 2023-01-06 PROCEDURE — A4216 STERILE WATER/SALINE, 10 ML: HCPCS | Performed by: HOSPITALIST

## 2023-01-06 RX ORDER — PANTOPRAZOLE SODIUM 40 MG/1
40 TABLET, DELAYED RELEASE ORAL
Qty: 60 TABLET | Refills: 0 | Status: SHIPPED | OUTPATIENT
Start: 2023-01-06 | End: 2023-01-12 | Stop reason: SDUPTHER

## 2023-01-06 RX ORDER — LANOLIN ALCOHOL/MO/W.PET/CERES
325 CREAM (GRAM) TOPICAL 2 TIMES DAILY
Qty: 60 TABLET | Refills: 0 | Status: SHIPPED | OUTPATIENT
Start: 2023-01-06 | End: 2023-01-12 | Stop reason: SDUPTHER

## 2023-01-06 RX ADMIN — ROSUVASTATIN CALCIUM 20 MG: 20 TABLET, COATED ORAL at 09:28

## 2023-01-06 RX ADMIN — SODIUM CHLORIDE, PRESERVATIVE FREE 40 MG: 5 INJECTION INTRAVENOUS at 09:28

## 2023-01-06 NOTE — CARE COORDINATION
Pt to d/c home today. Family will provide transportation. PT evaluated pt and does not recommend any further skilled therapy at d/c. Pt declines HH r/t her dogs in the home. No supportive care needs identified. Pt agrees with d/c plan. Milestones met. 01/05/23 1054   Service Assessment   Patient Orientation Alert and Oriented;Person;Place;Situation;Self   Cognition Alert   History Provided By Patient;Medical Record   Primary Caregiver Self   Support Systems Family Members; I[id number] is: Legal Next of Kin   PCP Verified by CM Yes  (Allison Baxter, NP)   Last Visit to PCP Within last 6 months   Prior Functional Level Independent in ADLs/IADLs   Current Functional Level Independent in ADLs/IADLs   Can patient return to prior living arrangement Yes   Ability to make needs known: Good   Family able to assist with home care needs: Yes   Would you like for me to discuss the discharge plan with any other family members/significant others, and if so, who? No   Financial Resources Medicaid; Medicare  [facility] Medicare)   Social/Functional History   Lives With Family   Type of [address] One level   Receives Help From Yoshi Monreal.   Ambulation Assistance Independent   Transfer Assistance Independent   Active  No   Patient's  Info Sister   Mode of Transportation Family   Occupation Retired   Discharge Planning   Type of [facility] Family Members   Current Services Prior To Admission None   Potential Assistance Needed N/A   DME Ordered? No   Potential Assistance Purchasing Medications No   Type of Home Care Services None   Patient expects to be discharged to: House   One/Two Story Residence One story   History of falls?  0   Services At/After Discharge   Transition of Care Consult (CM Consult) Discharge Planning   Services [address] Discharge None   [employer] Information Provided? No   Mode of Transport at Discharge Other (see comment)  (Family)   Condition of Participation: Discharge Planning   The Plan for Transition of Care is related to the following treatment goals: Pt to obtain care to become medically stable and to return with a safe transition   The Patient and/or Patient Representative was provided with a Choice of Provider? Patient   The Patient and/Or Patient Representative agree with the Discharge Plan? Yes   Freedom of Choice list was provided with basic dialogue that supports the patient's individualized plan of care/goals, treatment preferences, and shares the quality data associated with the providers?   Yes

## 2023-01-06 NOTE — DISCHARGE SUMMARY
Hospitalist Discharge Summary   Admit Date:  1/3/2023  8:49 PM   DC Note date: 2023  Name:  Attila Roman   Age:  78 y.o. Sex:  female  :  1945   MRN:  [id number]   Room:  [facility]  PCP:  TIANNA Bautista CNP    Presenting Complaint: Abnormal Lab     Initial Admission Diagnosis: General weakness [R53.1]  Severe anemia [D64.9]  Anemia, unspecified type [D64.9]     Problem List for this Hospitalization (present on admission):    Principal Problem (Resolved):    Severe anemia  Active Problems:    S/P CABG (coronary artery bypass graft)    Chronic systolic HF (heart failure) (HCC)    S/P mitral valve repair    Iron deficiency anemia due to chronic blood loss    Panlobular emphysema (HCC)    AICD (automatic cardioverter/defibrillator) present    Pure hypercholesterolemia    Coronary atherosclerosis of native coronary artery    Benign hypertensive heart disease with CHF (congestive heart failure) ([facility]  Resolved Problems:    Upper GI bleed      Hospital Course:  COPD, hx of GIB who presented to ED as referral from PCP for abnormal blood work, Hgb 4.4. Patient reports SOB and near syncopal symptoms, progressive of 1-2 month duration. ED workup: BP 98/85, afebrile, 98% on RA, no evidence of hypoxia  Labs notable for K 3.1, Hgb 4.3, Hct 16.1  CXR no acute process  Transfusion ordered by ED; patient received 3 U PRBC. Hospitalist consulted for admission with GI consultation placed. Patient underwent EGD  with findings of esophagitis, no stigmata of recent GIB. No active bleeding, okay to discharge and resume asa per GI. Office will call to schedule outpatient colonoscopy and capsule endoscopy. Hypokalemia normalized, H&H remains stable on discharge, 8.2/28. 2. Patient received iron infusion, continue oral iron supplementation. BP has stabilized; will continue to hold amlodipine on discharge until PCP follow-up. PT evaluation with no needs identified.      Patient is stable to discharge home today with PCP and GI follow-up. Discussed discharge plan in depth, all questions answered. Discussed s/sx that would prompt need for return evaluation. Instructions given to call a physician or return if any concerns. Disposition: Home  Diet: ADULT DIET; Clear Liquid; GI [place] (GERD/Peptic Ulcer)  Code Status: Full Code    Follow Ups:   Follow-up Information     TIANNA Medrano CNP Follow up on 1/20/2023. Specialty: Nurse Practitioner  Why: As scheduled  Contact information:  Marie Gutierrez [address] [place] [address] Follow up. Why: Office will call to schedule follow-up  Contact information:  [address] [facility] [address] [place]  [phone]                     Time spent in patient discharge and coordination 35 minutes.     Follow up labs/diagnostics: CBC / BMP 1 week    Current Discharge Medication List        CONTINUE these medications which have CHANGED    Details   ferrous sulfate (FE TABS 325) 325 (65 Fe) MG EC tablet Take 1 tablet by mouth 2 times daily  Qty: 60 tablet, Refills: 0      pantoprazole (PROTONIX) 40 MG tablet Take 1 tablet by mouth 2 times daily (before meals)  Qty: 60 tablet, Refills: 0           CONTINUE these medications which have NOT CHANGED    Details   carvedilol (COREG) 25 MG tablet TAKE 1 TABLET BY MOUTH TWICE DAILY WITH MEALS  Qty: 180 tablet, Refills: 3      lisinopril (PRINIVIL;ZESTRIL) 40 MG tablet Take 1 tablet by mouth daily  Qty: 90 tablet, Refills: 3      aspirin 81 MG EC tablet Take 81 mg by mouth daily      vitamin D3 (CHOLECALCIFEROL) 125 MCG (5000 UT) TABS tablet Take 5,000 Units by mouth daily      rosuvastatin (CRESTOR) 20 MG tablet TAKE 1 TABLET BY MOUTH DAILY           STOP taking these medications       amLODIPine (NORVASC) 5 MG tablet Comments:   Reason for Stopping:               Procedures done this admission:  Procedure(s):  EGD ESOPHAGOGASTRODUODENOSCOPY ER 3/ assigned 5th floor *HGB recheck    Consults this admission:  IP CONSULT TO GI    Echocardiogram results:  04/13/22    TRANSTHORACIC ECHOCARDIOGRAM (TTE) COMPLETE (CONTRAST/BUBBLE/3D PRN) 04/13/2022  6:20 PM, 04/13/2022 12:00 AM (Final)    Narrative  This is a summary report. The complete report is available in the patient's medical record. If you cannot access the medical record, please contact the sending organization for a detailed fax or copy. Left Ventricle: Left ventricle is moderately dilated. Normal wall thickness. Moderate global hypokinesis present. Moderately reduced left ventricular systolic function with a visually estimated EF of 35 - 40%. Abnormal diastolic function. Mitral Valve: Valve repaired by 26mm annular ring. Mild to moderate transvalvular regurgitation. ERO 0.24. RVol 43 mL. MV mean gradient is 4 mmHg at a HR of 60 BPM.    Tricuspid Valve: Mild to moderate transvalvular regurgitation. RVSP is 45 mmHg. Signed by: Riaz Villegas MD on 4/13/2022  6:20 PM, Signed by: Unknown Provider Result on 4/13/2022 12:00 AM      Diagnostic Imaging/Tests:   XR CHEST PORTABLE    Result Date: 1/3/2023  No acute process.        Labs: Results:       BMP, Mg, Phos Recent Labs     01/04/23  0709 01/05/23  0204 01/06/23  0215   * 146* 144*   K 3.0* 4.3 3.7   * 118* 116*   CO2 25 26 26   ANIONGAP 7 2 2   BUN 10 6* 4*   CREATININE 0.80 0.70 0.70   LABGLOM >60 >60 >60   CALCIUM 8.4 8.7 8.6   GLUCOSE 103* 91 105*   MG 2.1  --   --       CBC Recent Labs     01/03/23  2105 01/04/23  0330 01/04/23  0745 01/04/23  1125 01/05/23  0204 01/05/23  0947 01/05/23  2131 01/05/23  2347 01/06/23  0215   WBC 7.0  --  6.0  --  7.3  --   --   --   --    RBC 2.37*  --  3.09*  --  3.89*  --   --   --   --    HGB 4.3*   < > 6.7*   < > 8.6*   < > 7.8* 9.0* 8.2*   HCT 16.1*   < > 23.0*   < > 29.1*   < > 26.8* 31.5* 28.2*   MCV 67.9*  --  74.4*  --  74.8* --   --   --   --    MCH 18.1*  --  21.7*  --  22.1*  --   --   --   --    MCHC 26.7*  --  29.1*  --  29.6*  --   --   --   --    RDW 21.3*  --  21.6*  --  20.5*  --   --   --   --      --  283  --  276  --   --   --   --    MPV 9.9  --  9.8  --  9.6  --   --   --   --    NRBC 0.05  --  0.03  --  0.04  --   --   --   --    SEGS 67  --  72  --  74  --   --   --   --    LYMPHOPCT 20  --  17  --  15  --   --   --   --    EOSRELPCT 1  --  1  --  2  --   --   --   --    MONOPCT 10  --  9  --  8  --   --   --   --    BASOPCT 0  --  1  --  1  --   --   --   --    IMMGRAN 2  --  1  --  0  --   --   --   --    SEGSABS 4.7  --  4.3  --  5.4  --   --   --   --    LYMPHSABS 1.4  --  1.0  --  1.1  --   --   --   --    EOSABS 0.1  --  0.1  --  0.1  --   --   --   --    MONOSABS 0.7  --  0.6  --  0.6  --   --   --   --    BASOSABS 0.0  --  0.0  --  0.0  --   --   --   --    ABSIMMGRAN 0.1  --  0.0  --  0.0  --   --   --   --     < > = values in this interval not displayed.       LFT Recent Labs     01/03/23  2105   BILITOT 0.6   ALKPHOS 51   AST 11*   ALT 14   PROT 7.1   LABALBU 3.3   GLOB 3.8      Cardiac  Lab Results   Component Value Date/Time    NTPROBNP 1,432 01/03/2023 09:05 PM    NTPROBNP 2,497 10/25/2022 04:55 PM    TROPHS 16.2 01/03/2023 09:05 PM    TROPHS 13.7 10/25/2022 07:09 PM    TROPHS 15.5 10/25/2022 04:55 PM      Coags Lab Results   Component Value Date/Time    PROTIME 15.6 01/03/2023 09:05 PM    INR 1.2 01/03/2023 09:05 PM      A1c No results found for: LABA1C, EAG   Lipids Lab Results   Component Value Date/Time    CHOL 73 01/03/2023 08:37 AM    LDLCALC 23.4 01/03/2023 08:37 AM    LABVLDL 14.6 01/03/2023 08:37 AM    HDL 35 01/03/2023 08:37 AM    CHOLHDLRATIO 2.1 01/03/2023 08:37 AM    TRIG 73 01/03/2023 08:37 AM      Thyroid  Lab Results   Component Value Date/Time    TER1TSG 2.280 01/03/2023 08:37 AM        Most Recent UA No results found for: Ronny April, [address], [facility], [address] [facility] Gerry Carreno, Genene Amy, UROBILINOGEN, NITRU, LEUKOCYTESUR, WBCUA, RBCUA, EPITHUA, BACTERIA, LABCAST, MUCUS     No results for input(s): CULTURE in the last 720 hours.     All Labs from Last 24 Hrs:  Recent Results (from the past 24 hour(s))   Hemoglobin and Hematocrit    Collection Time: 01/05/23  9:47 AM   Result Value Ref Range    Hemoglobin 8.4 (L) 11.7 - 15.4 g/dL    Hematocrit 28.7 (L) 35.8 - 46.3 %   Hemoglobin and Hematocrit    Collection Time: 01/05/23  9:31 PM   Result Value Ref Range    Hemoglobin 7.8 (L) 11.7 - 15.4 g/dL    Hematocrit 26.8 (L) 35.8 - 46.3 %   Hemoglobin and Hematocrit    Collection Time: 01/05/23 11:47 PM   Result Value Ref Range    Hemoglobin 9.0 (L) 11.7 - 15.4 g/dL    Hematocrit 31.5 (L) 35.8 - 46.3 %   Hemoglobin and Hematocrit    Collection Time: 01/06/23  2:15 AM   Result Value Ref Range    Hemoglobin 8.2 (L) 11.7 - 15.4 g/dL    Hematocrit 28.2 (L) 35.8 - 46.3 %   Basic Metabolic Panel w/ Reflex to MG    Collection Time: 01/06/23  2:15 AM   Result Value Ref Range    Sodium 144 (H) 133 - 143 mmol/L    Potassium 3.7 3.5 - 5.1 mmol/L    Chloride 116 (H) 101 - 110 mmol/L    CO2 26 21 - 32 mmol/L    Anion Gap 2 2 - 11 mmol/L    Glucose 105 (H) 65 - 100 mg/dL    BUN 4 (L) 8 - 23 MG/DL    Creatinine 0.70 0.6 - 1.0 MG/DL    Est, Glom Filt Rate >60 >60 ml/min/1.73m2    Calcium 8.6 8.3 - 10.4 MG/DL       Allergies   Allergen Reactions    Influenza Virus Vaccine Other (See Comments)     Immunization History   Administered Date(s) Administered    COVID-19, MODERNA BLUE border, Primary or Immunocompromised, (age 12y+), IM, 100 mcg/0.5mL 04/23/2021, 06/17/2021, 12/24/2021    Influenza Trivalent 11/12/2015, 02/22/2017    Influenza Virus Vaccine 11/12/2015, 02/22/2017, 09/25/2019    Influenza, FLUARIX, FLULAVAL, FLUZONE (age 10 mo+) AND AFLURIA, (age 1 y+), PF, 0.5mL 09/25/2019    Influenza, High Dose (Fluzone 65 yrs and older) 10/14/2018    PPD Test 10/25/2022    Pneumococcal Conjugate 13-valent Brendon Garcia) 04/05/2018    Pneumococcal Polysaccharide ([id number]) 11/04/2013    Zoster Recombinant (Shingrix) 04/24/2019       Recent Vital Data:  Patient Vitals for the past 24 hrs:   Temp Pulse Resp BP SpO2   01/06/23 0309 98 °F (36.7 °C) 94 18 124/66 96 %   01/05/23 2322 98.1 °F (36.7 °C) 96 18 128/84 96 %   01/05/23 1954 99.1 °F (37.3 °C) 95 19 110/61 94 %   01/05/23 1458 98.4 °F (36.9 °C) 91 18 (!) 108/58 98 %   01/05/23 1341 98.4 °F (36.9 °C) 91 16 112/63 99 %   01/05/23 1104 98.2 °F (36.8 °C) 90 18 103/62 99 %   01/05/23 1055 -- -- -- 100/76 98 %   01/05/23 1054 -- -- -- -- 98 %       Oxygen Therapy  SpO2: 96 %  Pulse via Oximetry: 82 beats per minute  Pulse Oximeter Device Mode: Continuous  Pulse Oximeter Device Location: Right, Finger  O2 Device: None (Room air)  O2 Flow Rate (L/min): 3 L/min    Estimated body mass index is 23.52 kg/m² as calculated from the following:    Height as of this encounter: 5' 5\" (1.651 m). Weight as of this encounter: 141 lb 5 oz (64.1 kg). Intake/Output Summary (Last 24 hours) at 1/6/2023 0841  Last data filed at 1/5/2023 1341  Gross per 24 hour   Intake 1070 ml   Output --   Net 1070 ml         Physical Exam:  General:          Alert, frail, no acute distress  Head:               Normocephalic, atraumatic  Eyes:               Sclerae appear normal.  Pupils equally round. ENT:                Nares appear normal.  Moist oral mucosa  Neck:               No restricted ROM. Trachea midline   CV:                  RRR. No m/r/g. No jugular venous distension. Lungs:             CTAB. No wheezing, rhonchi. Respirations even and unlabored on RA  Abdomen:        Soft, nontender, nondistended. Bowel sounds normoactive. Extremities:     No cyanosis or clubbing. No edema  Skin:                No rashes and normal coloration. Warm and dry. Neuro:             CN II-XII grossly intact. Sensation intact. A&O x 4. Psych:             Normal mood and affect. Signed:  Jared Womack AGACNP-BC

## 2023-01-06 NOTE — PROGRESS NOTES
Uneventful shift  Q8hr hgb/hct - Results 7.8, 9.0, & 8.2  Next draw 1015    Shift report given to [facility], aníbal RN    Vitals:    01/05/23 1458 01/05/23 1954 01/05/23 2322 01/06/23 0309   BP: (!) 108/58 110/61 128/84 124/66   Pulse: 91 95 96 94   Resp: 18 19 18 18   Temp: 98.4 °F (36.9 °C) 99.1 °F (37.3 °C) 98.1 °F (36.7 °C) 98 °F (36.7 °C)   TempSrc: Oral Oral Oral Oral   SpO2: 98% 94% 96% 96%   Weight:       Height:

## 2023-01-06 NOTE — PLAN OF CARE
Pt doing well this morning. No complaints voiced. Patient denies pain, N/V/D. She has been up ambulating in room. VS stable.      Problem: Chronic Conditions and Co-morbidities  Goal: Patient's chronic conditions and co-morbidity symptoms are monitored and maintained or improved  Outcome: Completed     Problem: Safety - Adult  Goal: Free from fall injury  Outcome: Completed  Flowsheets (Taken 1/6/2023 0800)  Free From Fall Injury: Instruct family/caregiver on patient safety     Problem: Respiratory - Adult  Goal: Achieves optimal ventilation and oxygenation  Outcome: Completed     Problem: Cardiovascular - Adult  Goal: Maintains optimal cardiac output and hemodynamic stability  Outcome: Completed  Goal: Absence of cardiac dysrhythmias or at baseline  Outcome: Completed     Problem: Hematologic - Adult  Goal: Maintains hematologic stability  Outcome: Completed

## 2023-01-06 NOTE — PROGRESS NOTES
Discharge education completed with patient and pt given opportunity to ask questions. Pt made aware of upcoming appointment and educated on new med medications. Pt educated on when to seek medic attention is symptoms worsen.

## 2023-01-09 ENCOUNTER — CARE COORDINATION (OUTPATIENT)
Dept: CARE COORDINATION | Facility: CLINIC | Age: 78
End: 2023-01-09

## 2023-01-09 NOTE — CARE COORDINATION
Care Transitions Outreach Attempt    Call within 2 business days of discharge: Yes   Attempted to reach patient for transitions of care follow up. Unable to reach patient. Patient: Thomas Anaya Patient : 1945 MRN: [id number]    Last Discharge  Street       Date Complaint Diagnosis Description Type Department Provider    1/3/23 Abnormal Lab Anemia, unspecified type . .. ED to Hosp-Admission (Discharged) (ADMITTED) Ran Lucas MD; Anna Naylor. .. Was this an external facility discharge?  No Discharge Facility: [facility]    Noted following upcoming appointments from discharge chart review:   [facility] follow up appointment(s):   Future Appointments   Date Time Provider Lindy Luke   2023 10:00 AM TIANNA Huber - CNP PST [facility] AMB   2023 10:50 AM [facility] ROOM 1 SFERMAM E   2023 10:30 AM [facility] ECHO 21 UCDG [facility] AMB   2023 11:15 AM Vlad Gilbert MD [facility] AMB     Non-[facility] follow up appointment(s): n/a

## 2023-01-10 ENCOUNTER — CARE COORDINATION (OUTPATIENT)
Dept: CARE COORDINATION | Facility: CLINIC | Age: 78
End: 2023-01-10

## 2023-01-10 NOTE — CARE COORDINATION
Care Transitions Outreach Attempt    Call within 2 business days of discharge: Yes   Attempted to reach patient for transitions of care follow up. Unable to reach patient. Patient: Sarmad Mike Patient : 1945 MRN: [id number]    Last Discharge  Street       Date Complaint Diagnosis Description Type Department Provider    1/3/23 Abnormal Lab Anemia, unspecified type . .. ED to Hosp-Admission (Discharged) (ADMITTED) Fam Weir MD; José Luis Quigley. .. Was this an external facility discharge?  No Discharge Facility: [facility]    Noted following upcoming appointments from discharge chart review:   [facility] follow up appointment(s):   Future Appointments   Date Time Provider Lindy Luke   2023 10:00 AM Norm Lundborg, APRN - CNP PST [facility] AMB   [facility]   2023 10:30 AM [facility] ECHO 21 DG [facility] AMB   2023 11:15 AM Amna Nunes MD [facility] AMB     Non-[facility] follow up appointment(s): GI to call with appointment per discharge paperwork

## 2023-01-11 ENCOUNTER — CARE COORDINATION (OUTPATIENT)
Dept: CARE COORDINATION | Facility: CLINIC | Age: 78
End: 2023-01-11

## 2023-01-11 NOTE — CARE COORDINATION
Care Transitions Outreach Attempt    Call within 2 business days of discharge: Yes   Attempted to reach patient for transitions of care follow up. Unable to reach patient. Patient: Isi Brown Patient : 1945 MRN: [id number]    Last Discharge  Street       Date Complaint Diagnosis Description Type Department Provider    1/3/23 Abnormal Lab Anemia, unspecified type . .. ED to Hosp-Admission (Discharged) (ADMITTED) Dionicio Sharma MD; Saba Dear. .. Was this an external facility discharge?  No Discharge Facility: [facility]    Noted following upcoming appointments from discharge chart review:   [facility] follow up appointment(s):   Future Appointments   Date Time Provider Lindy Luke   2023 10:00 AM TIANNA Yee - CNP PST [facility] AMB   2023 10:50 AM S[facility] ROOM 1 SFERMAM SFE   2023 10:30 AM [facility] ECHO 21 UCDG GVL AMB   2023 11:15 AM Justo Neville MD [facility] GVL AMB     Non-[facility] follow up appointment(s): n/a

## 2023-01-12 ENCOUNTER — OFFICE VISIT (OUTPATIENT)
Dept: FAMILY MEDICINE CLINIC | Facility: CLINIC | Age: 78
End: 2023-01-12
Payer: MEDICARE

## 2023-01-12 VITALS
HEART RATE: 80 BPM | SYSTOLIC BLOOD PRESSURE: 104 MMHG | BODY MASS INDEX: 23.53 KG/M2 | OXYGEN SATURATION: 98 % | HEIGHT: 65 IN | WEIGHT: 141.2 LBS | DIASTOLIC BLOOD PRESSURE: 66 MMHG

## 2023-01-12 DIAGNOSIS — D50.0 IRON DEFICIENCY ANEMIA DUE TO CHRONIC BLOOD LOSS: ICD-10-CM

## 2023-01-12 DIAGNOSIS — E55.9 VITAMIN D DEFICIENCY, UNSPECIFIED: ICD-10-CM

## 2023-01-12 DIAGNOSIS — E78.00 PURE HYPERCHOLESTEROLEMIA: ICD-10-CM

## 2023-01-12 DIAGNOSIS — Z95.810 PRESENCE OF AUTOMATIC (IMPLANTABLE) CARDIAC DEFIBRILLATOR: ICD-10-CM

## 2023-01-12 DIAGNOSIS — I11.0 BENIGN HYPERTENSIVE HEART DISEASE WITH CHF (CONGESTIVE HEART FAILURE) (HCC): ICD-10-CM

## 2023-01-12 DIAGNOSIS — Z00.00 ENCOUNTER FOR ANNUAL WELLNESS VISIT (AWV) IN MEDICARE PATIENT: Primary | ICD-10-CM

## 2023-01-12 DIAGNOSIS — I25.118 ATHEROSCLEROSIS OF NATIVE CORONARY ARTERY OF NATIVE HEART WITH STABLE ANGINA PECTORIS (HCC): ICD-10-CM

## 2023-01-12 LAB
GRANS ABSOLUTE, POC: 5.4 K/UL
GRANULOCYTES %, POC: 64.6 %
HEMATOCRIT, POC: ABNORMAL %
HEMOGLOBIN, POC: ABNORMAL G/DL
LYMPHOCYTE %, POC: 29.8 %
LYMPHS ABSOLUTE, POC: 2.5 K/UL
MCH, POC: ABNORMAL PG (ref 40–?)
MCHC, POC: ABNORMAL
MCV, POC: 81.8
MONOCYTE %, POC: 5.6 %
MONOCYTE, ABSOLUTE POC: 0.5 K/UL
MPV, POC: 7.7 FL
PLATELET COUNT, POC: 297 K/UL
RBC, POC: ABNORMAL M/UL
RDW, POC: ABNORMAL %
WBC, POC: 8.3 K/UL

## 2023-01-12 PROCEDURE — G0439 PPPS, SUBSEQ VISIT: HCPCS | Performed by: NURSE PRACTITIONER

## 2023-01-12 PROCEDURE — 36415 COLL VENOUS BLD VENIPUNCTURE: CPT | Performed by: NURSE PRACTITIONER

## 2023-01-12 PROCEDURE — 1123F ACP DISCUSS/DSCN MKR DOCD: CPT | Performed by: NURSE PRACTITIONER

## 2023-01-12 PROCEDURE — 85025 COMPLETE CBC W/AUTO DIFF WBC: CPT | Performed by: NURSE PRACTITIONER

## 2023-01-12 RX ORDER — PANTOPRAZOLE SODIUM 40 MG/1
40 TABLET, DELAYED RELEASE ORAL
Qty: 60 TABLET | Refills: 2 | Status: SHIPPED | OUTPATIENT
Start: 2023-01-12

## 2023-01-12 RX ORDER — LANOLIN ALCOHOL/MO/W.PET/CERES
325 CREAM (GRAM) TOPICAL 2 TIMES DAILY
Qty: 60 TABLET | Refills: 2 | Status: SHIPPED | OUTPATIENT
Start: 2023-01-12 | End: 2023-02-11

## 2023-01-12 RX ORDER — ROSUVASTATIN CALCIUM 20 MG/1
TABLET, COATED ORAL
Qty: 90 TABLET | Refills: 3 | Status: SHIPPED | OUTPATIENT
Start: 2023-01-12

## 2023-01-12 RX ORDER — LISINOPRIL 40 MG/1
40 TABLET ORAL DAILY
Qty: 90 TABLET | Refills: 3 | Status: SHIPPED | OUTPATIENT
Start: 2023-01-12

## 2023-01-12 ASSESSMENT — ENCOUNTER SYMPTOMS
RECTAL PAIN: 0
PHOTOPHOBIA: 0
EYE DISCHARGE: 0
WHEEZING: 0
SHORTNESS OF BREATH: 0
TROUBLE SWALLOWING: 0
RESPIRATORY NEGATIVE: 1
SINUS PAIN: 0
ABDOMINAL PAIN: 0
SORE THROAT: 0
CHEST TIGHTNESS: 0
CHOKING: 0
STRIDOR: 0
ABDOMINAL DISTENTION: 0
GASTROINTESTINAL NEGATIVE: 1
VOMITING: 0
DIARRHEA: 0
APNEA: 0
CONSTIPATION: 0
EYES NEGATIVE: 1
ALLERGIC/IMMUNOLOGIC NEGATIVE: 1
EYE PAIN: 0
BLOOD IN STOOL: 0
COLOR CHANGE: 0
COUGH: 0
EYE REDNESS: 0
NAUSEA: 0
RHINORRHEA: 0
SINUS PRESSURE: 0
EYE ITCHING: 0
VOICE CHANGE: 0
BACK PAIN: 0
ANAL BLEEDING: 0
FACIAL SWELLING: 0

## 2023-01-12 ASSESSMENT — PATIENT HEALTH QUESTIONNAIRE - PHQ9
2. FEELING DOWN, DEPRESSED OR HOPELESS: 0
SUM OF ALL RESPONSES TO PHQ QUESTIONS 1-9: 0
1. LITTLE INTEREST OR PLEASURE IN DOING THINGS: 0
SUM OF ALL RESPONSES TO PHQ9 QUESTIONS 1 & 2: 0

## 2023-01-12 ASSESSMENT — LIFESTYLE VARIABLES
HOW OFTEN DO YOU HAVE A DRINK CONTAINING ALCOHOL: NEVER
HOW MANY STANDARD DRINKS CONTAINING ALCOHOL DO YOU HAVE ON A TYPICAL DAY: PATIENT DOES NOT DRINK

## 2023-01-12 NOTE — PATIENT INSTRUCTIONS
Learning About Managing Anger  What causes anger? Many things can cause anger: Stress at work or at home. Social situations that make you angry. A response to everyday events. Anger signals your body to prepare for a fight. This reaction is often called \"fight or flight. \" When you get angry, adrenaline and other hormones are released into your blood. Then your blood pressure goes up, your heart beats faster, and you breathe faster. When you express anger in a healthy way, it can inspire change and make you productive. But if you don't have the skills to express anger in a healthy way, anger can build up. You may hurt others--and yourself--emotionally and even physically. Violent behavior often starts with verbal threats or fairly minor incidents. But over time, it can involve physical harm. It can include physical, verbal, or sexual abuse of an intimate partner (domestic violence), a child (child abuse), or an older adult (elder abuse). It can also make you sick. Anger and constant hostility keep your blood pressure high. They increase your chances of having another health problem, such as depression, a heart attack, or a stroke. Some people with post-traumatic stress disorder (PTSD) feel angry and on alert all the time. It may feel like there are no other ways to react when you are angry. But when you learn to work with anger in appropriate and healthy ways, your anger no longer controls you. How can you manage your anger? The first step to managing anger is to be more aware of it. Note the thoughts, feelings, and emotions that you have when you get angry. Practice noticing these signs of anger when you are calm. If you are more aware of the signs of anger, you can take steps to manage it. Here are a few tips: Think before you act. Take time to stop and cool down when you feel yourself getting angry. Count to 10 while you take slow, steady breaths. Practice some other form of mental relaxation.   Learn the feelings that lead to angry outbursts. Anger and hostility may be a symptom of unhappy feelings or depression about your job, your relationship, or other aspects of your personal life. Avoid situations that lead to angry outbursts. If standing in line bothers you, do errands at less busy times. Express anger in a healthy way. You might:  Go for a short walk or jog. Draw, paint, or listen to music to release the anger. Write in a daily journal.  Use \"I\" statements, not \"you\" statements, to discuss your anger. Say \"I don't feel valued when my needs are not being met\" instead of \"You make me mad when you are so inconsiderate. \"  Take care of yourself. Exercise regularly. Eat a variety of healthy foods. Don't skip meals. Try to get 8 hours of sleep each night. Limit your use of alcohol, and don't use drugs. Practice yoga, meditation, or zaida chi to relax. Explore other resources that may be available through your job or your community. Contact your human resources department at work. You might be able to get services through an employee assistance program.  Contact your local hospital, mental health facility, or health department. Ask what types of programs or support groups are available in your area. Do not keep guns in your home. If you must have guns in your home, unload them and lock them up. Lock ammunition in a separate place. Keep guns away from children. Where can you find help? If anger or stress starts to harm your work or personal relationships, you might seek help. You can learn ways to manage your feelings and actions. Talk to someone you trust, or find a counselor. There are groups in your area that can connect you with people to talk to. Behavioral Health Treatment Services . This service from the national Substance Abuse and [address] can help you find local counselors. Search online at [employer]. samhsa.gov or call 1-222-044-HELP (6295), or [employer] 9-919-464-879-903-3870. Parents Anonymous. Self-help groups that serve parents under stress, as well as children who have been abused, are available throughout the [facility] ([facility]), and [facility]. To find a group in your area, search online or in your phone book under Parents Anonymous or call (234) 680-7435. Where can you learn more? Go to http://www.perez.com/ and enter Z357 to learn more about \"Learning About Managing Anger. \"  Current as of: February 9, 2022               Content Version: 13.5  © 2006-2022 [employer]. Care instructions adapted under license by [facility]. If you have questions about a medical condition or this instruction, always ask your healthcare professional. [place] [address] any warranty or liability for your use of this information. Learning About Dental Care for Older Adults  Dental care for older adults: Overview  Dental care for older people is much the same as for younger adults. But older adults do have concerns that younger adults do not. Older adults may have problems with gum disease and decay on the roots of their teeth. They may need missing teeth replaced or broken fillings fixed. Or they may have dentures that need to be cared for. Some older adults may have trouble holding a toothbrush. You can help remind the person you are caring for to brush and floss their teeth or to clean their dentures. In some cases, you may need to do the brushing and other dental care tasks. People who have trouble using their hands or who have dementia may need this extra help. How can you help with dental care? Normal dental care  To keep the teeth and gums healthy:  Brush the teeth with fluoride toothpaste twice a day--in the morning and at night--and floss at least once a day. Plaque can quickly build up on the teeth of older adults. Watch for the signs of gum disease.  These signs include gums that bleed after brushing or after eating hard foods, such as apples. See a dentist regularly. Many experts recommend checkups every 6 months. Keep the dentist up to date on any new medications the person is taking. Encourage a balanced diet that includes whole grains, vegetables, and fruits, and that is low in saturated fat and sodium. Encourage the person you're caring for not to use tobacco products. They can affect dental and general health. Many older adults have a fixed income and feel that they can't afford dental care. But most towns and [facility] have programs in which dentists help older adults by lowering fees. Contact your area's public health offices or  for information about dental care in your area. Using a toothbrush  Older adults with arthritis sometimes have trouble brushing their teeth because they can't easily hold the toothbrush. Their hands and fingers may be stiff, painful, or weak. If this is the case, you can: Offer an electric toothbrush. Enlarge the handle of a non-electric toothbrush by wrapping a sponge, an elastic bandage, or adhesive tape around it. Push the toothbrush handle through a ball made of rubber or soft foam.  Make the handle longer and thicker by taping Popsicle sticks or tongue depressors to it. You may also be able to buy special toothbrushes, toothpaste dispensers, and floss holders. Your doctor may recommend a soft-bristle toothbrush if the person you care for bleeds easily. Bleeding can happen because of a health problem or from certain medicines. A toothpaste for sensitive teeth may help if the person you care for has sensitive teeth. How do you brush and floss someone's teeth? If the person you are caring for has a hard time cleaning their teeth on their own, you may need to brush and floss their teeth for them. It may be easiest to have the person sit and face away from you, and to sit or stand behind them.  That way you can steady their head against your arm as you reach around to floss and brush their teeth. Choose a place that has good lighting and is comfortable for both of you. Before you begin, gather your supplies. You will need gloves, floss, a toothbrush, and a container to hold water if you are not near a sink. Wash and dry your hands well and put on gloves. Start by flossing:  Gently work a piece of floss between each of the teeth toward the gums. A plastic flossing tool may make this easier, and they are available at most [facility]. Curve the floss around each tooth into a U-shape and gently slide it under the gum line. Move the floss firmly up and down several times to scrape off the plaque. After you've finished flossing, throw away the used floss and begin brushing:  Wet the brush and apply toothpaste. Place the brush at a 45-degree angle where the teeth meet the gums. Press firmly, and move the brush in small circles over the surface of the teeth. Be careful not to brush too hard. Vigorous brushing can make the gums pull away from the teeth and can scratch the tooth enamel. Brush all surfaces of the teeth, on the tongue side and on the cheek side. Pay special attention to the front teeth and all surfaces of the back teeth. Brush chewing surfaces with short back-and-forth strokes. After you've finished, help the person rinse the remaining toothpaste from their mouth. Where can you learn more? Go to http://www.woods.com/ and enter F944 to learn more about \"Learning About Dental Care for Older Adults. \"  Current as of: June 16, 2022               Content Version: 13.5  © 7275-5007 Healthwise, Incorporated. Care instructions adapted under license by [facility] ([facility]). If you have questions about a medical condition or this instruction, always ask your healthcare professional. [place] [address] any warranty or liability for your use of this information. Learning About Vision Tests  What are vision tests?      The four most common vision tests are visual acuity tests, refraction, visual field tests, and color vision tests. Visual acuity (sharpness) tests  These tests are used: To see if you need glasses or contact lenses. To monitor an eye problem. To check an eye injury. Visual acuity tests are done as part of routine exams. You may also have this test when you get your 's license or apply for some types of jobs. Visual field tests  These tests are used: To check for vision loss in any area of your range of vision. To screen for certain eye diseases. To look for nerve damage after a stroke, head injury, or other problem that could reduce blood flow to the brain. Refraction and color tests  A refraction test is done to find the right prescription for glasses and contact lenses. A color vision test is done to check for color blindness. Color vision is often tested as part of a routine exam. You may also have this test when you apply for a job where recognizing different colors is important, such as , electronics, or the [employer]. How are vision tests done? Visual acuity test   You cover one eye at a time. You read aloud from a wall chart across the room. You read aloud from a small card that you hold in your hand. Refraction   You look into a special device. The device puts lenses of different strengths in front of each eye to see how strong your glasses or contact lenses need to be. Visual field tests   Your doctor may have you look through special machines. Or your doctor may simply have you stare straight ahead while they move a finger into and out of your field of vision. Color vision test   You look at pieces of printed test patterns in various colors. You say what number or symbol you see. Your doctor may have you trace the number or symbol using a pointer. How do these tests feel?   There is very little chance of having a problem from this test. If dilating drops are used for a vision test, they may make the eyes sting and cause a medicine taste in the mouth. Follow-up care is a key part of your treatment and safety. Be sure to make and go to all appointments, and call your doctor if you are having problems. It's also a good idea to know your test results and keep a list of the medicines you take. Where can you learn more? Go to http://www.perez.com/ and enter G551 to learn more about \"Learning About Vision Tests. \"  Current as of: October 12, 2022               Content Version: 13.5  © 5814-4163 [employer]. Care instructions adapted under license by [facility] ([facility]). If you have questions about a medical condition or this instruction, always ask your healthcare professional. [address] any warranty or liability for your use of this information. Learning About Activities of Daily Living  What are activities of daily living? Activities of daily living (ADLs) are the basic self-care tasks you do every day. As you age, and if you have health problems, you may find that it's harder to do these things for yourself. That's when you may need some help. Your doctor uses ADLs to measure how much help you need. Knowing what you can and can't do for yourself is an important first step to getting help. And when you have the help you need, you can stay as independent as possible. Your doctor will want to know if you are able to do tasks such as: Take a bath or shower without help. Go to the bathroom by yourself. Dress and undress without help. Shave, comb your hair, and brush teeth on your own. Get in and out of bed or a chair without help. Feed yourself without help. If you are having trouble doing basic self-care tasks, talk with your doctor. You may want to bring a caregiver or family member who can help the doctor understand your needs and abilities. How will a doctor assess your ADLs?   Asking about ADLs is part of a routine health checkup your doctor will likely do as you age. Your health check might be done in a doctor's office, in your home, or at a hospital. The goal is to find out if you are having any problems that could make your health problems worse or that make it unsafe for you to be on your own. To measure your ADLs, your doctor will ask how hard it is for you to do routine tasks. He or she may also want to know if you have changed the way you do a task because of a health problem. He or she may watch how you:  Walk back and forth. Keep your balance while you stand or walk. Move from sitting to standing or from a bed to a chair. Button or unbutton a shirt or sweater. Remove and put on your shoes. It's normal to feel a little worried or anxious if you find you can't do all the things you used to be able to do. Talking with your doctor about ADLs isn't a test that you either pass or fail. It's just a way to get more information about your health and safety. Follow-up care is a key part of your treatment and safety. Be sure to make and go to all appointments, and call your doctor if you are having problems. It's also a good idea to know your test results and keep a list of the medicines you take. Current as of: October 6, 2021               Content Version: 13.5  © 2006-2022 Healthwise, Incorporated. Care instructions adapted under license by [facility] ([facility]). If you have questions about a medical condition or this instruction, always ask your healthcare professional. [place] [address] any warranty or liability for your use of this information. Advance Directives: Care Instructions  Overview  An advance directive is a legal way to state your wishes at the end of your life. It tells your family and your doctor what to do if you can't say what you want. There are two main types of advance directives. You can change them any time your wishes change. Living will.   This form tells your family and your doctor your wishes about life support and other treatment. The form is also called a declaration. Medical power of . This form lets you name a person to make treatment decisions for you when you can't speak for yourself. This person is called a health care agent (health care proxy, health care surrogate). The form is also called a durable power of  for health care. If you do not have an advance directive, decisions about your medical care may be made by a family member, or by a doctor or a  who doesn't know you. It may help to think of an advance directive as a gift to the people who care for you. If you have one, they won't have to make tough decisions by themselves. For more information, including forms for your state, see the [address] website (www.caringinfo.org/planning/advance-directives/). Follow-up care is a key part of your treatment and safety. Be sure to make and go to all appointments, and call your doctor if you are having problems. It's also a good idea to know your test results and keep a list of the medicines you take. What should you include in an advance directive? Many states have a unique advance directive form. (It may ask you to address specific issues.) Or you might use a universal form that's approved by many states. If your form doesn't tell you what to address, it may be hard to know what to include in your advance directive. Use the questions below to help you get started. Who do you want to make decisions about your medical care if you are not able to? What life-support measures do you want if you have a serious illness that gets worse over time or can't be cured? What are you most afraid of that might happen? (Maybe you're afraid of having pain, losing your independence, or being kept alive by machines.)  Where would you prefer to die? (Your home? A hospital? A nursing home?)  Do you want to donate your organs when you die?   Do you want certain Baptism practices performed before you die? When should you call for help? Be sure to contact your doctor if you have any questions. Where can you learn more? Go to http://www.perez.com/ and enter R264 to learn more about \"Advance Directives: Care Instructions. \"  Current as of: June 16, 2022               Content Version: 13.5  © 9483-6405 Healthwise, [employer]. Care instructions adapted under license by [facility] ([facility]). If you have questions about a medical condition or this instruction, always ask your healthcare professional. [address] any warranty or liability for your use of this information. Personalized Preventive Plan for Isi Brown - 1/12/2023  Medicare offers a range of preventive health benefits. Some of the tests and screenings are paid in full while other may be subject to a deductible, co-insurance, and/or copay. Some of these benefits include a comprehensive review of your medical history including lifestyle, illnesses that may run in your family, and various assessments and screenings as appropriate. After reviewing your medical record and screening and assessments performed today your provider may have ordered immunizations, labs, imaging, and/or referrals for you. A list of these orders (if applicable) as well as your Preventive Care list are included within your After Visit Summary for your review. Other Preventive Recommendations:    A preventive eye exam performed by an eye specialist is recommended every 1-2 years to screen for glaucoma; cataracts, macular degeneration, and other eye disorders. A preventive dental visit is recommended every 6 months. Try to get at least 150 minutes of exercise per week or 10,000 steps per day on a pedometer . Order or download the FREE \"Exercise & Physical Activity: Your Everyday Guide\" from [employer]. Call 6-577.553.7975 or search [employer] online.   You need 6442-4685 mg of calcium and 5525-2627 IU of vitamin D per day. It is possible to meet your calcium requirement with diet alone, but a vitamin D supplement is usually necessary to meet this goal.  When exposed to the sun, use a sunscreen that protects against both UVA and UVB radiation with an SPF of 30 or greater. Reapply every 2 to 3 hours or after sweating, drying off with a towel, or swimming. Always wear a seat belt when traveling in a car. Always wear a helmet when riding a bicycle or motorcycle.

## 2023-01-12 NOTE — PROGRESS NOTES
PROGRESS NOTE    Chief Complaint   Patient presents with    Medicare AWV     Reports was admitted to 3 days to ER, for anemia. Reports was given to 2 pints of blood. States feels much better today. SUBJECTIVE:     Darryl Upton is a very pleasant 78 y.o. female with hx of iron deficient anemia, CHF, CAD s/p CABG-currently followed by cardiology, hypertension, and hyperlipidemia, seen today in office for follow-up for hospital admission. She is also due for her subsequent AWV. Patient had report complaints of weakness, fatigue and exertional shortness of breath that progressively worsened over the last month. Patient reports having constipation and GI upset with use of oral iron supplementation. She has stopped use of medication. Patient was brought into the office for lab draw and was noted to have a hemoglobin of 4.4. Patient was advised to go immediately to the ED where she subsequently admitted for 3 days. She received 2 units of PRBC and she reports feeling significantly better. She was discharged home on ferrous sulfate twice daily and Protonix twice daily. She is established with GI and has a follow-up appointment as well as currently scheduled for an EGD/colonoscopy in February. She reports compliance with use of ferrous sulfate twice daily and Protonix twice daily and she reports no side effects. She feels much better today. Patient reports no chest pain, no shortness of breath, no unilateral or focal weakness, no orthopnea or PND. GI and  review of systems is unremarkable. Past Medical History, Past Surgical History, Family history, Social History, and Medications were all reviewed with the patient today and updated as necessary.        Current Outpatient Medications   Medication Sig Dispense Refill    ferrous sulfate (FE TABS 325) 325 (65 Fe) MG EC tablet Take 1 tablet by mouth 2 times daily 60 tablet 2    pantoprazole (PROTONIX) 40 MG tablet Take 1 tablet by mouth 2 times daily (before meals) 60 tablet 2    rosuvastatin (CRESTOR) 20 MG tablet TAKE 1 TABLET BY MOUTH DAILY for cholesterol 90 tablet 3    lisinopril (PRINIVIL;ZESTRIL) 40 MG tablet Take 1 tablet by mouth daily For blood pressure 90 tablet 3    carvedilol (COREG) 25 MG tablet TAKE 1 TABLET BY MOUTH TWICE DAILY WITH MEALS 180 tablet 3    aspirin 81 MG EC tablet Take 81 mg by mouth daily      vitamin D3 (CHOLECALCIFEROL) 125 MCG (5000 UT) TABS tablet Take 5,000 Units by mouth daily       No current facility-administered medications for this visit. Allergies   Allergen Reactions    Influenza Virus Vaccine Other (See Comments)    Other Rash     Glove powder causes rash; can use powder-free gloves ok. Patient Active Problem List   Diagnosis    S/P CABG (coronary artery bypass graft)    Carotid artery stenosis without cerebral infarction    Hypoxia    Chronic systolic HF (heart failure) ([facility])    S/P mitral valve repair    Iron deficiency anemia due to chronic blood loss    Panlobular emphysema ([facility])    AICD (automatic cardioverter/defibrillator) present    Pure hypercholesterolemia    Coronary atherosclerosis of native coronary artery    Benign hypertensive heart disease with CHF (congestive heart failure) ([facility])    Syncope and collapse    COVID-19 with multiple comorbidities     Past Medical History:   Diagnosis Date    Acute mitral regurgitation     AICD (automatic cardioverter/defibrillator) present 12/23/2015    Anterior myocardial infarction [facility])     Arrhythmia     Benign hypertensive heart disease with CHF (congestive heart failure) ([facility] 12/23/2015    CAD (coronary artery disease)     AC9002; PC 1999I; Ischemic CM EF 25%    Carotid artery stenosis without cerebral infarction 12/23/2015    Chronic ischemic heart disease 12/23/2015    Chronic systolic HF (heart failure) ([facility] 3/17/2016    0/2009 - EF 30-35%.  02/2010 - Single leadICD - [employer] 02/2012:  EF 45-50% 03/2013:  EF 40-45% 09/2013:  EF 45% 09/2014:  EF 45% 2015:  EF 40-45%    Coronary atherosclerosis of native coronary artery     Dyspnea on exertion     GERD (gastroesophageal reflux disease)     GI bleed 2009    Hyperlipidemia     Hypertension     Influenza A (H5N1) 2019    Menopause     54's    OA (osteoarthritis)     PUD (peptic ulcer disease)     hx ulcer  dx 2009    S/P mitral valve repair 3/17/2016    Tobacco use disorder      Past Surgical History:   Procedure Laterality Date    [address]    PCI  x 1 - pt does not have stent card     SECTION      x2    CORONARY ARTERY BYPASS GRAFT      MITRALPLASTY W CP BYPASS      with repair device    PACEMAKER      TUBAL LIGATION      UPPER GASTROINTESTINAL ENDOSCOPY N/A 10/27/2022    EGD BIOPSY performed by Bienvenido Philip MD at [address] N/A 2023    EGD ESOPHAGOGASTRODUODENOSCOPY ER 3/ assigned 5th floor *HGB recheck performed by Alfredo Starks MD at [facility] ENDOSCOPY     Family History   Problem Relation Age of Onset    Heart Surgery Brother     Heart Disease Sister     Heart Disease Father     Heart Attack Father     Breast Cancer Neg Hx     Coronary Art Dis Other     No Known Problems Mother     Heart Disease Brother      Social History     Tobacco Use    Smoking status: Former     Packs/day: 0.25     Types: Cigarettes     Start date: 1975     Quit date: 2009     Years since quittin.5    Smokeless tobacco: Never    Tobacco comments:     Quit smokin09   Substance Use Topics    Alcohol use: No         REVIEW OF SYSTEM    Review of Systems   Constitutional:  Positive for fatigue (Significantly improved). Negative for activity change, appetite change, chills, diaphoresis, fever and unexpected weight change. HENT: Negative.   Negative for congestion, dental problem, drooling, ear discharge, ear pain, facial swelling, hearing loss, mouth sores, nosebleeds, postnasal drip, rhinorrhea, sinus pressure, sinus pain, sneezing, sore throat, tinnitus, trouble swallowing and voice change. Eyes: Negative. Negative for photophobia, pain, discharge, redness, itching and visual disturbance. Respiratory: Negative. Negative for apnea, cough, choking, chest tightness, shortness of breath, wheezing and stridor. Cardiovascular: Negative. Negative for chest pain, palpitations and leg swelling. Gastrointestinal: Negative. Negative for abdominal distention, abdominal pain, anal bleeding, blood in stool, constipation, diarrhea, nausea, rectal pain and vomiting. Endocrine: Negative. Negative for cold intolerance, heat intolerance, polydipsia, polyphagia and polyuria. Genitourinary: Negative. Negative for decreased urine volume, difficulty urinating, dysuria, enuresis, flank pain, frequency, genital sores, hematuria and urgency. Musculoskeletal: Negative. Negative for arthralgias, back pain, gait problem, joint swelling, myalgias, neck pain and neck stiffness. Skin: Negative. Negative for color change, pallor, rash and wound. Allergic/Immunologic: Negative. Negative for environmental allergies, food allergies and immunocompromised state. Neurological: Negative. Negative for dizziness, tremors, seizures, syncope, facial asymmetry, speech difficulty, weakness, light-headedness, numbness and headaches. Hematological: Negative. Negative for adenopathy. Does not bruise/bleed easily. Psychiatric/Behavioral: Negative. Negative for agitation, behavioral problems, confusion, decreased concentration, dysphoric mood, hallucinations, self-injury, sleep disturbance and suicidal ideas. The patient is not nervous/anxious and is not hyperactive. OBJECTIVE:  /66   Pulse 80   Ht 5' 5\" (1.651 m)   Wt 141 lb 3.2 oz (64 kg)   SpO2 98%   BMI 23.50 kg/m²      Physical Exam  Constitutional:       General: She is not in acute distress. Appearance: Normal appearance.  She is not ill-appearing, toxic-appearing or diaphoretic. HENT:      Head: Normocephalic and atraumatic. Right Ear: Tympanic membrane, ear canal and external ear normal. There is no impacted cerumen. Left Ear: Tympanic membrane, ear canal and external ear normal. There is no impacted cerumen. Nose: Nose normal.      Mouth/Throat:      Mouth: Mucous membranes are moist.      Pharynx: Oropharynx is clear. Eyes:      Extraocular Movements: Extraocular movements intact. Conjunctiva/sclera: Conjunctivae normal.      Pupils: Pupils are equal, round, and reactive to light. Neck:      Vascular: No carotid bruit. Cardiovascular:      Rate and Rhythm: Normal rate and regular rhythm. Pulses: Normal pulses. Heart sounds: Normal heart sounds. Pulmonary:      Effort: Pulmonary effort is normal. No respiratory distress. Breath sounds: Normal breath sounds. No stridor. No wheezing, rhonchi or rales. Chest:      Chest wall: No tenderness. Abdominal:      General: Abdomen is flat. Bowel sounds are normal. There is no distension. Palpations: Abdomen is soft. There is no shifting dullness, fluid wave, hepatomegaly, splenomegaly, mass or pulsatile mass. Tenderness: There is no abdominal tenderness. There is no right CVA tenderness, left CVA tenderness, guarding or rebound. Negative signs include Dumont's sign, Rovsing's sign, McBurney's sign, psoas sign and obturator sign. Hernia: No hernia is present. Musculoskeletal:         General: Normal range of motion. Cervical back: Normal range of motion and neck supple. No rigidity or tenderness. Lymphadenopathy:      Cervical: No cervical adenopathy. Skin:     General: Skin is warm and dry. Capillary Refill: Capillary refill takes less than 2 seconds. Neurological:      General: No focal deficit present. Mental Status: She is alert and oriented to person, place, and time.    Psychiatric:         Mood and Affect: Mood normal.         Behavior: Behavior normal.         Thought Content: Thought content normal.         Judgment: Judgment normal.         Medical problems and test results were reviewed with the patient today. ASSESSMENT and PLAN    1. Encounter for annual wellness visit (AWV) in Medicare patient   Anticipatory guidance for age-seatbelts, avoid cell phone use in car (may use hands free), no texting while driving, avoid social drugs and tobacco, limit alcohol, fall safety, personal safety with appropriate use of helmets and equipment for protection, and appropriate use of sun screen and sun protection to prevent skin cancer. Encourage healthy diet and exercise daily. 2. Iron deficiency anemia due to chronic blood loss  -     AMB POC KTY COMPLETE CBC  -     ferrous sulfate (FE TABS 325) 325 (65 Fe) MG EC tablet; Take 1 tablet by mouth 2 times daily, Disp-60 tablet, R-2Normal  -     pantoprazole (PROTONIX) 40 MG tablet; Take 1 tablet by mouth 2 times daily (before meals), Disp-60 tablet, R-2Normal  -     83670 - Venipuncture    Hemoglobin is now increased to 9.2. Patient continues to reports feeling well after her hospital admission. She also reports compliance with taking ferrous sulfate twice daily in addition to her pantoprazole twice daily. We will have patient continue current therapy at this time and have patient keep her follow-up appointment with her GI as well as her EGD and colonoscopy as currently scheduled. We will follow-up in 6 to 8 weeks for repeat labs. 3. Benign hypertensive heart disease with CHF (congestive heart failure) (HCC)  -     lisinopril (PRINIVIL;ZESTRIL) 40 MG tablet; Take 1 tablet by mouth daily For blood pressure, Disp-90 tablet, R-3Normal    Blood pressure today in office 104/66. Continue with lisinopril daily. 4. Pure hypercholesterolemia  -     rosuvastatin (CRESTOR) 20 MG tablet; TAKE 1 TABLET BY MOUTH DAILY for cholesterol, Disp-90 tablet, R-3Normal    Stable on Crestor. Continue current therapy. Patient to also keep follow-up appointment with her cardiologist for treatment recommendation and monitoring. 5. Atherosclerosis of native coronary artery of native heart with stable angina pectoris ([address]   As above. 6. Vitamin D deficiency, unspecified   Stable. Continue with current supplementation. Orders Placed This Encounter   Procedures    73792 - Venipuncture    AMB POC KTY COMPLETE CBC         Elements of this note have been dictated using speech recognition software. As a result, errors of speech recognition may have occurred. On this date 1/12/2023 I have spent 30 minutes reviewing previous notes, test results and face to face with the patient discussing the diagnosis and importance of compliance with the treatment plan as well as documenting on the day of the visit. Greater than 50% of this visit was spent counseling the patient about test results, prognosis, importance of compliance, education about disease process, benefits of medications, instructions for management of acute symptoms, and follow up plans. Return in about 8 weeks (around 3/10/2023) for in office at 10 am with repeat cbc same day. TIANNA Martin - CNP    Medicare Annual Wellness Visit    Fernando Savage is here for Medicare AWV (Reports was admitted to 3 days to ER, for anemia. Reports was given to 2 pints of blood. States feels much better today. )    Assessment & Plan   Encounter for annual wellness visit (AWV) in Medicare patient  Iron deficiency anemia due to chronic blood loss  -     AMB POC KTY COMPLETE CBC  -     ferrous sulfate (FE TABS 325) 325 (65 Fe) MG EC tablet; Take 1 tablet by mouth 2 times daily, Disp-60 tablet, R-2Normal  -     pantoprazole (PROTONIX) 40 MG tablet;  Take 1 tablet by mouth 2 times daily (before meals), Disp-60 tablet, R-2Normal  -     46161 - Venipuncture  Benign hypertensive heart disease with CHF (congestive heart failure) (HCC)  -     lisinopril (PRINIVIL;ZESTRIL) 40 MG tablet; Take 1 tablet by mouth daily For blood pressure, Disp-90 tablet, R-3Normal  Pure hypercholesterolemia  -     rosuvastatin (CRESTOR) 20 MG tablet; TAKE 1 TABLET BY MOUTH DAILY for cholesterol, Disp-90 tablet, R-3Normal  Atherosclerosis of native coronary artery of native heart with stable angina pectoris (HCC)  Vitamin D deficiency, unspecified      Recommendations for Preventive Services Due: see orders and patient instructions/AVS.  Recommended screening schedule for the next 5-10 years is provided to the patient in written form: see Patient Instructions/AVS.     Return in about 8 weeks (around 3/10/2023) for in office at 10 am with repeat cbc same day. Subjective   The following acute and/or chronic problems were also addressed today:  As above. Patient's complete Health Risk Assessment and screening values have been reviewed and are found in Flowsheets. The following problems were reviewed today and where indicated follow up appointments were made and/or referrals ordered. Positive Risk Factor Screenings with Interventions:                  General HRA Questions:  Select all that apply           Weight and Activity:  Physical Activity: Insufficiently Active    Days of Exercise per Week: 2 days    Minutes of Exercise per Session: 20 min     On average, how many days per week do you engage in moderate to strenuous exercise (like a brisk walk)?: 2 days  Have you lost any weight without trying in the past 3 months?: No  Body mass index: 23.49        Dentist Screen:  Have you seen the dentist within the past year?: (!) No    Intervention:  Advised to schedule with their dentist     Vision Screen:  Do you have difficulty driving, watching TV, or doing any of your daily activities because of your eyesight?: (!) Yes (wears eye glasses)  Have you had an eye exam within the past year?: (!) No  No results found.     Interventions:   Patient encouraged to make appointment with their eye specialist ADL's:   Patient reports needing help with:  Select all that apply: (!) Transportation  Interventions:  Patient comments: Patient has family member who can provide assistance with transportation. Advanced Directives:  Do you have a Living Will?: (!) No    Intervention:  has an advanced directive - a copy HAS NOT been provided. Objective   Vitals:    01/12/23 1028   BP: 104/66   Pulse: 80   SpO2: 98%   Weight: 141 lb 3.2 oz (64 kg)   Height: 5' 5\" (1.651 m)      Body mass index is 23.5 kg/m². Allergies   Allergen Reactions    Influenza Virus Vaccine Other (See Comments)    Other Rash     Glove powder causes rash; can use powder-free gloves ok. Prior to Visit Medications    Medication Sig Taking?  Authorizing Provider   ferrous sulfate (FE TABS 325) 325 (65 Fe) MG EC tablet Take 1 tablet by mouth 2 times daily Yes TIANNA Fritz CNP   pantoprazole (PROTONIX) 40 MG tablet Take 1 tablet by mouth 2 times daily (before meals) Yes TIANNA Fritz CNP   rosuvastatin (CRESTOR) 20 MG tablet TAKE 1 TABLET BY MOUTH DAILY for cholesterol Yes TIANNA Fritz CNP   lisinopril (PRINIVIL;ZESTRIL) 40 MG tablet Take 1 tablet by mouth daily For blood pressure Yes TIANNA Fritz CNP   carvedilol (COREG) 25 MG tablet TAKE 1 TABLET BY MOUTH TWICE DAILY WITH MEALS Yes Ramon Robins MD   aspirin 81 MG EC tablet Take 81 mg by mouth daily Yes Ar Automatic Reconciliation   vitamin D3 (CHOLECALCIFEROL) 125 MCG (5000 UT) TABS tablet Take 5,000 Units by mouth daily Yes Ar Automatic Reconciliation       CareTeam (Including outside providers/suppliers regularly involved in providing care):   Patient Care Team:  TIANNA Fritz CNP as PCP - General  TIANNA Fritz CNP as PCP - [facility] Empaneled Provider     Reviewed and updated this visit:  Tobacco  Allergies  Meds  Problems  Med Hx  Surg Hx  Soc Hx  Fam Hx

## 2023-01-20 ENCOUNTER — HOSPITAL ENCOUNTER (OUTPATIENT)
Dept: MAMMOGRAPHY | Age: 78
End: 2023-01-20
Payer: MEDICARE

## 2023-01-20 DIAGNOSIS — R92.8 ABNORMAL MAMMOGRAM OF RIGHT BREAST: ICD-10-CM

## 2023-01-20 PROCEDURE — 77065 DX MAMMO INCL CAD UNI: CPT

## 2023-03-10 ENCOUNTER — OFFICE VISIT (OUTPATIENT)
Dept: FAMILY MEDICINE CLINIC | Facility: CLINIC | Age: 78
End: 2023-03-10

## 2023-03-10 VITALS
SYSTOLIC BLOOD PRESSURE: 118 MMHG | OXYGEN SATURATION: 98 % | HEIGHT: 65 IN | BODY MASS INDEX: 22.82 KG/M2 | DIASTOLIC BLOOD PRESSURE: 72 MMHG | WEIGHT: 137 LBS | HEART RATE: 66 BPM

## 2023-03-10 DIAGNOSIS — E78.00 PURE HYPERCHOLESTEROLEMIA: ICD-10-CM

## 2023-03-10 DIAGNOSIS — J30.89 OTHER ALLERGIC RHINITIS: ICD-10-CM

## 2023-03-10 DIAGNOSIS — I25.118 ATHEROSCLEROSIS OF NATIVE CORONARY ARTERY OF NATIVE HEART WITH STABLE ANGINA PECTORIS (HCC): ICD-10-CM

## 2023-03-10 DIAGNOSIS — E55.9 VITAMIN D DEFICIENCY, UNSPECIFIED: ICD-10-CM

## 2023-03-10 DIAGNOSIS — D50.0 IRON DEFICIENCY ANEMIA DUE TO CHRONIC BLOOD LOSS: Primary | ICD-10-CM

## 2023-03-10 DIAGNOSIS — I11.0 BENIGN HYPERTENSIVE HEART DISEASE WITH CHF (CONGESTIVE HEART FAILURE) (HCC): ICD-10-CM

## 2023-03-10 LAB
ALBUMIN SERPL-MCNC: 3.5 G/DL (ref 3.2–4.6)
ALBUMIN/GLOB SERPL: 1 (ref 0.4–1.6)
ALP SERPL-CCNC: 42 U/L (ref 50–136)
ALT SERPL-CCNC: 11 U/L (ref 12–65)
ANION GAP SERPL CALC-SCNC: 5 MMOL/L (ref 2–11)
AST SERPL-CCNC: 13 U/L (ref 15–37)
BILIRUB SERPL-MCNC: 0.4 MG/DL (ref 0.2–1.1)
BUN SERPL-MCNC: 9 MG/DL (ref 8–23)
CALCIUM SERPL-MCNC: 9.3 MG/DL (ref 8.3–10.4)
CHLORIDE SERPL-SCNC: 112 MMOL/L (ref 101–110)
CO2 SERPL-SCNC: 28 MMOL/L (ref 21–32)
CREAT SERPL-MCNC: 0.6 MG/DL (ref 0.6–1)
FERRITIN SERPL-MCNC: 40 NG/ML (ref 8–388)
FOLATE SERPL-MCNC: 29.5 NG/ML (ref 3.1–17.5)
GLOBULIN SER CALC-MCNC: 3.6 G/DL (ref 2.8–4.5)
GLUCOSE SERPL-MCNC: 95 MG/DL (ref 65–100)
GRANS ABSOLUTE, POC: 3.3 K/UL
GRANULOCYTES %, POC: 60.4 %
HEMATOCRIT, POC: ABNORMAL %
HEMOGLOBIN, POC: ABNORMAL G/DL
IRON SERPL-MCNC: 29 UG/DL (ref 35–150)
LYMPHOCYTE %, POC: 32.9 %
LYMPHS ABSOLUTE, POC: 1.8 K/UL
MAGNESIUM SERPL-MCNC: 2 MG/DL (ref 1.8–2.4)
MCH, POC: ABNORMAL PG (ref 40–?)
MCHC, POC: 31.8
MCV, POC: 93.9
MONOCYTE %, POC: 6.7 %
MONOCYTE, ABSOLUTE POC: 0.4 K/UL
MPV, POC: 7.8 FL
PLATELET COUNT, POC: 376 K/UL
POTASSIUM SERPL-SCNC: 3.7 MMOL/L (ref 3.5–5.1)
PROT SERPL-MCNC: 7.1 G/DL (ref 6.3–8.2)
RBC, POC: ABNORMAL M/UL
RDW, POC: 14.5 %
SODIUM SERPL-SCNC: 145 MMOL/L (ref 133–143)
TRANSFERRIN SERPL-MCNC: 183 MG/DL (ref 202–364)
VIT B12 SERPL-MCNC: 624 PG/ML (ref 193–986)
WBC, POC: 5.4 K/UL

## 2023-03-10 RX ORDER — AMLODIPINE BESYLATE 5 MG/1
TABLET ORAL
COMMUNITY
Start: 2023-01-09

## 2023-03-10 RX ORDER — FLUTICASONE PROPIONATE 50 MCG
2 SPRAY, SUSPENSION (ML) NASAL DAILY
Qty: 16 G | Refills: 5 | Status: SHIPPED | OUTPATIENT
Start: 2023-03-10

## 2023-03-10 RX ORDER — PANTOPRAZOLE SODIUM 40 MG/1
40 TABLET, DELAYED RELEASE ORAL DAILY
Qty: 90 TABLET | Refills: 2 | Status: SHIPPED | OUTPATIENT
Start: 2023-03-10

## 2023-03-10 RX ORDER — LANOLIN ALCOHOL/MO/W.PET/CERES
325 CREAM (GRAM) TOPICAL 2 TIMES DAILY
Qty: 60 TABLET | Refills: 2 | Status: SHIPPED | OUTPATIENT
Start: 2023-03-10 | End: 2023-04-09

## 2023-03-10 SDOH — ECONOMIC STABILITY: FOOD INSECURITY: WITHIN THE PAST 12 MONTHS, THE FOOD YOU BOUGHT JUST DIDN'T LAST AND YOU DIDN'T HAVE MONEY TO GET MORE.: SOMETIMES TRUE

## 2023-03-10 SDOH — ECONOMIC STABILITY: INCOME INSECURITY: HOW HARD IS IT FOR YOU TO PAY FOR THE VERY BASICS LIKE FOOD, HOUSING, MEDICAL CARE, AND HEATING?: NOT HARD AT ALL

## 2023-03-10 SDOH — ECONOMIC STABILITY: HOUSING INSECURITY
IN THE LAST 12 MONTHS, WAS THERE A TIME WHEN YOU DID NOT HAVE A STEADY PLACE TO SLEEP OR SLEPT IN A SHELTER (INCLUDING NOW)?: NO

## 2023-03-10 SDOH — ECONOMIC STABILITY: FOOD INSECURITY: WITHIN THE PAST 12 MONTHS, YOU WORRIED THAT YOUR FOOD WOULD RUN OUT BEFORE YOU GOT MONEY TO BUY MORE.: SOMETIMES TRUE

## 2023-03-10 ASSESSMENT — ENCOUNTER SYMPTOMS
SORE THROAT: 0
EYE REDNESS: 0
SINUS PAIN: 0
BACK PAIN: 0
NAUSEA: 0
CHEST TIGHTNESS: 0
APNEA: 0
ABDOMINAL DISTENTION: 0
ALLERGIC/IMMUNOLOGIC NEGATIVE: 1
EYE PAIN: 0
VOMITING: 0
CHOKING: 0
TROUBLE SWALLOWING: 0
GASTROINTESTINAL NEGATIVE: 1
RECTAL PAIN: 0
ANAL BLEEDING: 0
SHORTNESS OF BREATH: 0
EYE DISCHARGE: 0
CONSTIPATION: 0
WHEEZING: 0
STRIDOR: 0
FACIAL SWELLING: 0
DIARRHEA: 0
COLOR CHANGE: 0
SINUS PRESSURE: 0
RHINORRHEA: 0
EYES NEGATIVE: 1
BLOOD IN STOOL: 0
EYE ITCHING: 0
RESPIRATORY NEGATIVE: 1
COUGH: 0
VOICE CHANGE: 0
ABDOMINAL PAIN: 0
PHOTOPHOBIA: 0

## 2023-03-10 ASSESSMENT — PATIENT HEALTH QUESTIONNAIRE - PHQ9
2. FEELING DOWN, DEPRESSED OR HOPELESS: 0
1. LITTLE INTEREST OR PLEASURE IN DOING THINGS: 0
SUM OF ALL RESPONSES TO PHQ QUESTIONS 1-9: 0
SUM OF ALL RESPONSES TO PHQ QUESTIONS 1-9: 0
SUM OF ALL RESPONSES TO PHQ9 QUESTIONS 1 & 2: 0
SUM OF ALL RESPONSES TO PHQ QUESTIONS 1-9: 0
SUM OF ALL RESPONSES TO PHQ QUESTIONS 1-9: 0

## 2023-03-10 NOTE — PROGRESS NOTES
PROGRESS NOTE    Chief Complaint   Patient presents with    Follow-up Chronic Condition       SUBJECTIVE:     Abdirizak Pardo is a very pleasant 78 y.o. female with hx of iron deficient anemia, CHF, CAD s/p CABG-currently followed by cardiology, hypertension, and hyperlipidemia , seen today in office for follow-up for anemia. She was hospitalized in January for anemia. Since then, she has been doing quite well. She continues to take iron supplementation twice daily and reports more energy overall. She reports no side effects. She reports no chest pain, no shortness of breath, no palpitation, no unilateral or focal weakness, no abdominal pain, no nausea, no vomiting, no diarrhea, no incontinence of bladder or bowel function. Past Medical History, Past Surgical History, Family history, Social History, and Medications were all reviewed with the patient today and updated as necessary. Current Outpatient Medications   Medication Sig Dispense Refill    amLODIPine (NORVASC) 5 MG tablet TAKE 1 TABLET BY MOUTH DAILY      pantoprazole (PROTONIX) 40 MG tablet Take 1 tablet by mouth daily On empty stomach and wait at least 30 minutes before for stomach protection 90 tablet 2    ferrous sulfate (FE TABS 325) 325 (65 Fe) MG EC tablet Take 1 tablet by mouth 2 times daily 60 tablet 2    fluticasone (FLONASE) 50 MCG/ACT nasal spray 2 sprays by Each Nostril route daily For allergies 16 g 5    rosuvastatin (CRESTOR) 20 MG tablet TAKE 1 TABLET BY MOUTH DAILY for cholesterol 90 tablet 3    lisinopril (PRINIVIL;ZESTRIL) 40 MG tablet Take 1 tablet by mouth daily For blood pressure 90 tablet 3    carvedilol (COREG) 25 MG tablet TAKE 1 TABLET BY MOUTH TWICE DAILY WITH MEALS 180 tablet 3    aspirin 81 MG EC tablet Take 81 mg by mouth daily      vitamin D3 (CHOLECALCIFEROL) 125 MCG (5000 UT) TABS tablet Take 5,000 Units by mouth daily       No current facility-administered medications for this visit.      Allergies Allergen Reactions    Influenza Virus Vaccine Other (See Comments)    Other Rash     Glove powder causes rash; can use powder-free gloves ok. Patient Active Problem List   Diagnosis    S/P CABG (coronary artery bypass graft)    Carotid artery stenosis without cerebral infarction    Hypoxia    Chronic systolic HF (heart failure) ([facility])    S/P mitral valve repair    Iron deficiency anemia due to chronic blood loss    Panlobular emphysema ([facility])    AICD (automatic cardioverter/defibrillator) present    Pure hypercholesterolemia    Coronary atherosclerosis of native coronary artery    Benign hypertensive heart disease with CHF (congestive heart failure) ([facility])    Syncope and collapse    COVID-19 with multiple comorbidities     Past Medical History:   Diagnosis Date    Acute mitral regurgitation     AICD (automatic cardioverter/defibrillator) present 2015    Anterior myocardial infarction [facility])     Arrhythmia     Benign hypertensive heart disease with CHF (congestive heart failure) ([facility] 2015    CAD (coronary artery disease)     [id number]; PC 1999I; Ischemic CM EF 25%    Carotid artery stenosis without cerebral infarction 2015    Chronic ischemic heart disease 2015    Chronic systolic HF (heart failure) ([facility] [address] 3/17/2016     - EF 30-35%.  2010 - Single leadICD - [employer] 2012:  EF 45-50% 2013:  EF 40-45% 2013:  EF 45% 2014:  EF 45% 2015:  EF 40-45%    Coronary atherosclerosis of native coronary artery     Dyspnea on exertion     GERD (gastroesophageal reflux disease)     GI bleed 2009    Hyperlipidemia     Hypertension     Influenza A (H5N1) 2019    Menopause     52's    OA (osteoarthritis)     PUD (peptic ulcer disease)     hx ulcer  dx 2009    S/P mitral valve repair 3/17/2016    Tobacco use disorder      Past Surgical History:   Procedure Laterality Date    One BATS    PCI  x 1 - pt does not have stent card     SECTION      x2 CORONARY ARTERY BYPASS GRAFT      MITRALPLASTY W CP BYPASS      with repair device    PACEMAKER      TUBAL LIGATION      UPPER GASTROINTESTINAL ENDOSCOPY N/A 10/27/2022    EGD BIOPSY performed by Dennie Clines, MD at [place] [address] N/A 2023    EGD ESOPHAGOGASTRODUODENOSCOPY ER 3/ assigned 5th floor *HGB recheck performed by Pedro Peralta MD at [facility] ENDOSCOPY     Family History   Problem Relation Age of Onset    Heart Surgery Brother     Heart Disease Sister     Heart Disease Father     Heart Attack Father     Breast Cancer Neg Hx     Coronary Art Dis Other     No Known Problems Mother     Heart Disease Brother      Social History     Tobacco Use    Smoking status: Former     Packs/day: 0.25     Years: 20.00     Pack years: 5.00     Types: Cigarettes     Start date: 1975     Quit date: 2009     Years since quittin.7    Smokeless tobacco: Never    Tobacco comments:     Quit smokin09   Substance Use Topics    Alcohol use: No         REVIEW OF SYSTEM    Review of Systems   Constitutional: Negative. Negative for activity change, appetite change, chills, diaphoresis, fatigue, fever and unexpected weight change. HENT: Negative. Negative for congestion, dental problem, drooling, ear discharge, ear pain, facial swelling, hearing loss, mouth sores, nosebleeds, postnasal drip, rhinorrhea, sinus pressure, sinus pain, sneezing, sore throat, tinnitus, trouble swallowing and voice change. Eyes: Negative. Negative for photophobia, pain, discharge, redness, itching and visual disturbance. Respiratory: Negative. Negative for apnea, cough, choking, chest tightness, shortness of breath, wheezing and stridor. Cardiovascular: Negative. Negative for chest pain, palpitations and leg swelling. Gastrointestinal: Negative. Negative for abdominal distention, abdominal pain, anal bleeding, blood in stool, constipation, diarrhea, nausea, rectal pain and vomiting. Endocrine: Negative. Negative for cold intolerance, heat intolerance, polydipsia, polyphagia and polyuria. Genitourinary: Negative. Negative for decreased urine volume, difficulty urinating, dysuria, enuresis, flank pain, frequency, genital sores, hematuria and urgency. Musculoskeletal: Negative. Negative for arthralgias, back pain, gait problem, joint swelling, myalgias, neck pain and neck stiffness. Skin: Negative. Negative for color change, pallor, rash and wound. Allergic/Immunologic: Negative. Negative for environmental allergies, food allergies and immunocompromised state. Neurological: Negative. Negative for dizziness, tremors, seizures, syncope, facial asymmetry, speech difficulty, weakness, light-headedness, numbness and headaches. Hematological: Negative. Negative for adenopathy. Does not bruise/bleed easily. Psychiatric/Behavioral: Negative. Negative for agitation, behavioral problems, confusion, decreased concentration, dysphoric mood, hallucinations, self-injury, sleep disturbance and suicidal ideas. The patient is not nervous/anxious and is not hyperactive. OBJECTIVE:  /72   Pulse 66   Ht 5' 5\" (1.651 m)   Wt 137 lb (62.1 kg)   SpO2 98%   BMI 22.80 kg/m²      Physical Exam  Constitutional:       General: She is not in acute distress. Appearance: Normal appearance. She is not ill-appearing, toxic-appearing or diaphoretic. HENT:      Head: Normocephalic and atraumatic. Right Ear: Tympanic membrane, ear canal and external ear normal. There is no impacted cerumen. Left Ear: Tympanic membrane, ear canal and external ear normal. There is no impacted cerumen. Nose: Nose normal.      Mouth/Throat:      Mouth: Mucous membranes are moist.      Pharynx: Oropharynx is clear. Eyes:      Extraocular Movements: Extraocular movements intact. Conjunctiva/sclera: Conjunctivae normal.      Pupils: Pupils are equal, round, and reactive to light.    Neck: Vascular: No carotid bruit. Cardiovascular:      Rate and Rhythm: Normal rate and regular rhythm. Pulses: Normal pulses. Heart sounds: Normal heart sounds. Pulmonary:      Effort: Pulmonary effort is normal. No respiratory distress. Breath sounds: Normal breath sounds. No stridor. No wheezing, rhonchi or rales. Chest:      Chest wall: No tenderness. Abdominal:      General: Abdomen is flat. Bowel sounds are normal. There is no distension. Palpations: Abdomen is soft. There is no shifting dullness, fluid wave, hepatomegaly, splenomegaly, mass or pulsatile mass. Tenderness: There is no abdominal tenderness. There is no right CVA tenderness, left CVA tenderness, guarding or rebound. Negative signs include Dumont's sign, Rovsing's sign, McBurney's sign, psoas sign and obturator sign. Hernia: No hernia is present. Musculoskeletal:         General: Normal range of motion. Cervical back: Normal range of motion and neck supple. No rigidity or tenderness. Lymphadenopathy:      Cervical: No cervical adenopathy. Skin:     General: Skin is warm and dry. Capillary Refill: Capillary refill takes less than 2 seconds. Neurological:      General: No focal deficit present. Mental Status: She is alert and oriented to person, place, and time. Psychiatric:         Mood and Affect: Mood normal.         Behavior: Behavior normal.         Thought Content: Thought content normal.         Judgment: Judgment normal.         Medical problems and test results were reviewed with the patient today. ASSESSMENT and PLAN    1. Iron deficiency anemia due to chronic blood loss  -     AMB POC KTY COMPLETE CBC; Future  -     Vitamin B12; Future  -     Ferritin; Future  -     Transferrin; Future  -     Iron; Future  -     Folate; Future  -     Comprehensive Metabolic Panel; Future  -     Magnesium; Future  -     pantoprazole (PROTONIX) 40 MG tablet;  Take 1 tablet by mouth daily On empty stomach and wait at least 30 minutes before for stomach protection, Disp-90 tablet, R-2Normal  -     ferrous sulfate (FE TABS 325) 325 (65 Fe) MG EC tablet; Take 1 tablet by mouth 2 times daily, Disp-60 tablet, R-2Normal  -     Vitamin D 25 Hydroxy; Future  -     CBC with Auto Differential; Future  -     Comprehensive Metabolic Panel; Future    Hemoglobin is 11 today. She reports doing well on iron supplementation twice daily and reports no side effects with current therapy. We will have patient continue current supplementation. We will however, reduce pantoprazole to once daily from twice daily. We will recheck labs in 3 months. 2. Other allergic rhinitis  -     fluticasone (FLONASE) 50 MCG/ACT nasal spray; 2 sprays by Each Nostril route daily For allergies, Disp-16 g, R-5Normal    Stable. We will add Flonase to therapy for allergy prevention. 3. Benign hypertensive heart disease with CHF (congestive heart failure) (HCC)  -     TSH with Reflex; Future  -     Comprehensive Metabolic Panel; Future    Stable. Reports no chest pain, orthopnea or shortness of breath. Continue current therapy. Per cardiology recommendation. 4. Atherosclerosis of native coronary artery of native heart with stable angina pectoris ([facility] [address]  -     Lipid Panel; Future  -     Comprehensive Metabolic Panel; Future    Stable. Continue current therapy. Per cardiology recommendation. 5. Pure hypercholesterolemia  -     Lipid Panel; Future  -     Comprehensive Metabolic Panel; Future    As above. 6. Vitamin D deficiency, unspecified  -     Vitamin D 25 Hydroxy; Future    Stable. Continue with current therapy.       Orders Placed This Encounter   Procedures    Vitamin B12     Standing Status:   Future     Number of Occurrences:   1     Standing Expiration Date:   3/10/2024    Ferritin     Standing Status:   Future     Number of Occurrences:   1     Standing Expiration Date:   3/10/2024    Transferrin     Standing Status:  Future     Number of Occurrences:   1     Standing Expiration Date:   3/10/2024    Iron     Standing Status:   Future     Number of Occurrences:   1     Standing Expiration Date:   3/10/2024    Folate     Standing Status:   Future     Number of Occurrences:   1     Standing Expiration Date:   3/10/2024    Comprehensive Metabolic Panel     Standing Status:   Future     Number of Occurrences:   1     Standing Expiration Date:   3/10/2024    Magnesium     Standing Status:   Future     Number of Occurrences:   1     Standing Expiration Date:   3/10/2024    Lipid Panel     Standing Status:   Future     Standing Expiration Date:   3/10/2024     Order Specific Question:   Is Patient Fasting?/# of Hours     Answer:   8 Hours    Vitamin D 25 Hydroxy     Standing Status:   Future     Standing Expiration Date:   3/10/2024    TSH with Reflex     Standing Status:   Future     Standing Expiration Date:   3/10/2024    CBC with Auto Differential     Standing Status:   Future     Standing Expiration Date:   3/10/2024    Comprehensive Metabolic Panel     Standing Status:   Future     Standing Expiration Date:   3/10/2024    AMB POC KTY COMPLETE CBC     Standing Status:   Future     Number of Occurrences:   1     Standing Expiration Date:   3/10/2024         Elements of this note have been dictated using speech recognition software. As a result, errors of speech recognition may have occurred.      On this date 3/10/2023 I have spent 30 minutes reviewing previous notes, test results and face to face with the patient discussing the diagnosis and importance of compliance with the treatment plan as well as documenting on the day of the visit. Greater than 50% of this visit was spent counseling the patient about test results, prognosis, importance of compliance, education about disease process, benefits of medications, instructions for management of acute symptoms, and follow up plans.    Return in about 3 months (around 6/10/2023) for  Follow up with fasting labs prior.      Loretta Lutz, APRN - CNP

## 2023-05-04 ENCOUNTER — OFFICE VISIT (OUTPATIENT)
Dept: CARDIOLOGY CLINIC | Age: 78
End: 2023-05-04
Payer: MEDICARE

## 2023-05-04 VITALS
HEART RATE: 72 BPM | SYSTOLIC BLOOD PRESSURE: 126 MMHG | WEIGHT: 135 LBS | HEIGHT: 65 IN | DIASTOLIC BLOOD PRESSURE: 72 MMHG | BODY MASS INDEX: 22.49 KG/M2

## 2023-05-04 DIAGNOSIS — Z95.810 AICD (AUTOMATIC CARDIOVERTER/DEFIBRILLATOR) PRESENT: Chronic | ICD-10-CM

## 2023-05-04 DIAGNOSIS — I50.22 CHRONIC SYSTOLIC HF (HEART FAILURE) (HCC): ICD-10-CM

## 2023-05-04 DIAGNOSIS — J43.1 PANLOBULAR EMPHYSEMA (HCC): ICD-10-CM

## 2023-05-04 DIAGNOSIS — I65.21 STENOSIS OF RIGHT CAROTID ARTERY WITHOUT CEREBRAL INFARCTION: Primary | ICD-10-CM

## 2023-05-04 DIAGNOSIS — I25.10 ATHEROSCLEROSIS OF NATIVE CORONARY ARTERY OF NATIVE HEART WITHOUT ANGINA PECTORIS: ICD-10-CM

## 2023-05-04 DIAGNOSIS — Z98.890 S/P MITRAL VALVE REPAIR: Chronic | ICD-10-CM

## 2023-05-04 DIAGNOSIS — I11.0 BENIGN HYPERTENSIVE HEART DISEASE WITH CHF (CONGESTIVE HEART FAILURE) (HCC): ICD-10-CM

## 2023-05-04 DIAGNOSIS — E78.00 PURE HYPERCHOLESTEROLEMIA: ICD-10-CM

## 2023-05-04 DIAGNOSIS — D50.0 IRON DEFICIENCY ANEMIA DUE TO CHRONIC BLOOD LOSS: ICD-10-CM

## 2023-05-04 PROCEDURE — 1123F ACP DISCUSS/DSCN MKR DOCD: CPT | Performed by: INTERNAL MEDICINE

## 2023-05-04 PROCEDURE — 99214 OFFICE O/P EST MOD 30 MIN: CPT | Performed by: INTERNAL MEDICINE

## 2023-05-04 ASSESSMENT — ENCOUNTER SYMPTOMS
SHORTNESS OF BREATH: 0
COUGH: 0
ABDOMINAL PAIN: 0
BACK PAIN: 0

## 2023-05-04 NOTE — PROGRESS NOTES
[address], [address]      23      NAME:  General Rivero  : 1945  MRN: [id number]      SUBJECTIVE:   General Rivero is a 70 y.o. female seen for a follow up visit regarding the following:     Chief Complaint   Patient presents with    Congestive Heart Failure    Hyperlipidemia    Coronary Artery Disease         HPI:   She has history of CAD, HFrEF and single lead ICD. Overall she feels well. She is exercising regularly. Patient is asymptomatic. She has been exercising daily. LHC 2019 with stable CAD and 3/3 grafts patent. No CP. Echocardiogram 2023 with ejection fraction 50-55%. Was 30-35% 2022. She has been stable on medical therapy. No orthopnea, PND or edema. Past Medical History, Past Surgical History, Family history, Social History, and Medications were all reviewed with the patient today and updated as necessary. Current Outpatient Medications   Medication Sig Dispense Refill    amLODIPine (NORVASC) 5 MG tablet TAKE 1 TABLET BY MOUTH DAILY      pantoprazole (PROTONIX) 40 MG tablet Take 1 tablet by mouth daily On empty stomach and wait at least 30 minutes before for stomach protection 90 tablet 2    ferrous sulfate (FE TABS 325) 325 (65 Fe) MG EC tablet Take 1 tablet by mouth 2 times daily 60 tablet 2    fluticasone (FLONASE) 50 MCG/ACT nasal spray 2 sprays by Each Nostril route daily For allergies 16 g 5    rosuvastatin (CRESTOR) 20 MG tablet TAKE 1 TABLET BY MOUTH DAILY for cholesterol 90 tablet 3    lisinopril (PRINIVIL;ZESTRIL) 40 MG tablet Take 1 tablet by mouth daily For blood pressure 90 tablet 3    carvedilol (COREG) 25 MG tablet TAKE 1 TABLET BY MOUTH TWICE DAILY WITH MEALS 180 tablet 3    aspirin 81 MG EC tablet Take 1 tablet by mouth daily      vitamin D3 (CHOLECALCIFEROL) 125 MCG (5000 UT) TABS tablet Take 1 tablet by mouth daily       No current facility-administered medications for this visit.

## 2023-06-05 PROCEDURE — 93295 DEV INTERROG REMOTE 1/2/MLT: CPT | Performed by: INTERNAL MEDICINE

## 2023-06-05 PROCEDURE — 93296 REM INTERROG EVL PM/IDS: CPT | Performed by: INTERNAL MEDICINE

## 2023-06-21 ENCOUNTER — OFFICE VISIT (OUTPATIENT)
Dept: FAMILY MEDICINE CLINIC | Facility: CLINIC | Age: 78
End: 2023-06-21
Payer: MEDICARE

## 2023-06-21 VITALS
DIASTOLIC BLOOD PRESSURE: 70 MMHG | HEART RATE: 72 BPM | SYSTOLIC BLOOD PRESSURE: 128 MMHG | OXYGEN SATURATION: 99 % | RESPIRATION RATE: 17 BRPM | BODY MASS INDEX: 21.97 KG/M2 | WEIGHT: 132 LBS

## 2023-06-21 DIAGNOSIS — Z95.810 AICD (AUTOMATIC CARDIOVERTER/DEFIBRILLATOR) PRESENT: Chronic | ICD-10-CM

## 2023-06-21 DIAGNOSIS — E55.9 VITAMIN D DEFICIENCY, UNSPECIFIED: ICD-10-CM

## 2023-06-21 DIAGNOSIS — D50.0 IRON DEFICIENCY ANEMIA DUE TO CHRONIC BLOOD LOSS: Primary | ICD-10-CM

## 2023-06-21 DIAGNOSIS — J30.89 OTHER ALLERGIC RHINITIS: ICD-10-CM

## 2023-06-21 DIAGNOSIS — E78.00 PURE HYPERCHOLESTEROLEMIA: ICD-10-CM

## 2023-06-21 DIAGNOSIS — R53.83 OTHER FATIGUE: ICD-10-CM

## 2023-06-21 DIAGNOSIS — I11.0 BENIGN HYPERTENSIVE HEART DISEASE WITH CHF (CONGESTIVE HEART FAILURE) (HCC): ICD-10-CM

## 2023-06-21 PROCEDURE — 1123F ACP DISCUSS/DSCN MKR DOCD: CPT | Performed by: NURSE PRACTITIONER

## 2023-06-21 PROCEDURE — 99214 OFFICE O/P EST MOD 30 MIN: CPT | Performed by: NURSE PRACTITIONER

## 2023-06-21 RX ORDER — LANOLIN ALCOHOL/MO/W.PET/CERES
325 CREAM (GRAM) TOPICAL
Qty: 90 TABLET | Refills: 3 | Status: SHIPPED | OUTPATIENT
Start: 2023-06-21

## 2023-06-21 SDOH — ECONOMIC STABILITY: INCOME INSECURITY: HOW HARD IS IT FOR YOU TO PAY FOR THE VERY BASICS LIKE FOOD, HOUSING, MEDICAL CARE, AND HEATING?: NOT HARD AT ALL

## 2023-06-21 SDOH — ECONOMIC STABILITY: FOOD INSECURITY: WITHIN THE PAST 12 MONTHS, YOU WORRIED THAT YOUR FOOD WOULD RUN OUT BEFORE YOU GOT MONEY TO BUY MORE.: SOMETIMES TRUE

## 2023-06-21 SDOH — ECONOMIC STABILITY: FOOD INSECURITY: WITHIN THE PAST 12 MONTHS, THE FOOD YOU BOUGHT JUST DIDN'T LAST AND YOU DIDN'T HAVE MONEY TO GET MORE.: SOMETIMES TRUE

## 2023-06-21 ASSESSMENT — ENCOUNTER SYMPTOMS
VOICE CHANGE: 0
CONSTIPATION: 0
WHEEZING: 0
FACIAL SWELLING: 0
APNEA: 0
SHORTNESS OF BREATH: 0
RHINORRHEA: 0
TROUBLE SWALLOWING: 0
EYES NEGATIVE: 1
EYE DISCHARGE: 0
GASTROINTESTINAL NEGATIVE: 1
ANAL BLEEDING: 0
COUGH: 0
BACK PAIN: 0
PHOTOPHOBIA: 0
SINUS PRESSURE: 0
NAUSEA: 0
ABDOMINAL DISTENTION: 0
BLOOD IN STOOL: 0
STRIDOR: 0
CHOKING: 0
EYE ITCHING: 0
RECTAL PAIN: 0
ABDOMINAL PAIN: 0
RESPIRATORY NEGATIVE: 1
SORE THROAT: 0
COLOR CHANGE: 0
EYE PAIN: 0
CHEST TIGHTNESS: 0
DIARRHEA: 0
SINUS PAIN: 0
EYE REDNESS: 0
ALLERGIC/IMMUNOLOGIC NEGATIVE: 1
VOMITING: 0

## 2023-06-21 ASSESSMENT — PATIENT HEALTH QUESTIONNAIRE - PHQ9
SUM OF ALL RESPONSES TO PHQ9 QUESTIONS 1 & 2: 0
SUM OF ALL RESPONSES TO PHQ QUESTIONS 1-9: 0
1. LITTLE INTEREST OR PLEASURE IN DOING THINGS: 0
SUM OF ALL RESPONSES TO PHQ QUESTIONS 1-9: 0
2. FEELING DOWN, DEPRESSED OR HOPELESS: 0

## 2023-06-21 NOTE — PROGRESS NOTES
06/12/2023    ALKPHOS 53 06/12/2023    AST 18 06/12/2023    ALT 9 (L) 06/12/2023    LABGLOM >60 06/12/2023    GFRAA >60 06/28/2022    AGRATIO 1.5 12/20/2021    GLOB 3.5 06/12/2023       Lab Results   Component Value Date    TSH 4.430 12/20/2021    TSHELE 3.48 06/12/2023    TKR8CYN 2.280 01/03/2023     Lab Results   Component Value Date/Time    VITD25 46.1 06/12/2023 09:49 AM      Lab Results   Component Value Date    WBC 4.6 06/12/2023    HGB 11.7 06/12/2023    HCT 38.6 06/12/2023    MCV 95.5 06/12/2023     06/12/2023     Lab Results   Component Value Date    CHOL 163 06/12/2023    CHOL 73 01/03/2023    CHOL 140 06/28/2022     Lab Results   Component Value Date    TRIG 50 06/12/2023    TRIG 73 01/03/2023    TRIG 69 06/28/2022     Lab Results   Component Value Date    HDL 55 06/12/2023    HDL 35 (L) 01/03/2023    HDL 55 06/28/2022     Lab Results   Component Value Date    LDLCALC 98 06/12/2023    LDLCALC 23.4 01/03/2023    LDLCALC 71.2 06/28/2022     Lab Results   Component Value Date    LABVLDL 10 06/12/2023    LABVLDL 14.6 01/03/2023    LABVLDL 13.8 06/28/2022    VLDL 12 12/20/2021    VLDL 12 06/10/2021    VLDL 13 09/22/2020     Lab Results   Component Value Date    CHOLHDLRATIO 3.0 06/12/2023    CHOLHDLRATIO 2.1 01/03/2023    CHOLHDLRATIO 2.5 06/28/2022         ASSESSMENT and PLAN    1. Iron deficiency anemia due to chronic blood loss  -     ferrous sulfate (FE TABS 325) 325 (65 Fe) MG EC tablet; Take 1 tablet by mouth daily (with breakfast), Disp-90 tablet, R-3Normal    Hemoglobin remaining stable at 11.7. Reports she is still taking her iron supplementation but not able to take it on a daily basis. Encouraged her to continue to take medication supplementation daily if possible to keep level to remain normal.  We will refill medication for supplementation today. Recheck labs in 6 months. 2. Pure hypercholesterolemia   Stable. Continue with current therapy.   Advised patient to keep her appointment

## 2023-07-05 RX ORDER — AMLODIPINE BESYLATE 5 MG/1
TABLET ORAL
Qty: 90 TABLET | Refills: 3 | Status: SHIPPED | OUTPATIENT
Start: 2023-07-05

## 2023-07-14 ENCOUNTER — TRANSCRIBE ORDERS (OUTPATIENT)
Dept: SCHEDULING | Age: 78
End: 2023-07-14

## 2023-07-14 DIAGNOSIS — Z12.31 OTHER SCREENING MAMMOGRAM: Primary | ICD-10-CM

## 2023-07-27 ENCOUNTER — HOSPITAL ENCOUNTER (OUTPATIENT)
Dept: MAMMOGRAPHY | Age: 78
Discharge: HOME | End: 2023-07-27
Payer: MEDICARE

## 2023-07-27 DIAGNOSIS — Z12.31 OTHER SCREENING MAMMOGRAM: ICD-10-CM

## 2023-07-27 PROCEDURE — 77067 SCR MAMMO BI INCL CAD: CPT

## 2023-09-05 PROCEDURE — 93295 DEV INTERROG REMOTE 1/2/MLT: CPT | Performed by: INTERNAL MEDICINE

## 2023-09-05 PROCEDURE — 93296 REM INTERROG EVL PM/IDS: CPT | Performed by: INTERNAL MEDICINE

## 2023-09-14 DIAGNOSIS — Z95.810 PRESENCE OF AUTOMATIC (IMPLANTABLE) CARDIAC DEFIBRILLATOR: ICD-10-CM

## 2023-11-08 ENCOUNTER — NURSE ONLY (OUTPATIENT)
Age: 78
End: 2023-11-08

## 2023-11-08 ENCOUNTER — OFFICE VISIT (OUTPATIENT)
Age: 78
End: 2023-11-08
Payer: MEDICARE

## 2023-11-08 VITALS
SYSTOLIC BLOOD PRESSURE: 120 MMHG | DIASTOLIC BLOOD PRESSURE: 62 MMHG | BODY MASS INDEX: 22.13 KG/M2 | HEART RATE: 72 BPM | WEIGHT: 133 LBS

## 2023-11-08 DIAGNOSIS — D50.0 IRON DEFICIENCY ANEMIA DUE TO CHRONIC BLOOD LOSS: ICD-10-CM

## 2023-11-08 DIAGNOSIS — I25.10 ATHEROSCLEROSIS OF NATIVE CORONARY ARTERY OF NATIVE HEART WITHOUT ANGINA PECTORIS: ICD-10-CM

## 2023-11-08 DIAGNOSIS — Z95.810 AICD (AUTOMATIC CARDIOVERTER/DEFIBRILLATOR) PRESENT: Chronic | ICD-10-CM

## 2023-11-08 DIAGNOSIS — Z98.890 S/P MITRAL VALVE REPAIR: Chronic | ICD-10-CM

## 2023-11-08 DIAGNOSIS — I11.0 BENIGN HYPERTENSIVE HEART DISEASE WITH CHF (CONGESTIVE HEART FAILURE) (HCC): ICD-10-CM

## 2023-11-08 DIAGNOSIS — E78.00 PURE HYPERCHOLESTEROLEMIA: ICD-10-CM

## 2023-11-08 DIAGNOSIS — I65.23 BILATERAL CAROTID ARTERY STENOSIS WITHOUT CEREBRAL INFARCTION: ICD-10-CM

## 2023-11-08 DIAGNOSIS — Z95.810 AICD (AUTOMATIC CARDIOVERTER/DEFIBRILLATOR) PRESENT: Primary | ICD-10-CM

## 2023-11-08 DIAGNOSIS — I50.22 CHRONIC SYSTOLIC HF (HEART FAILURE) (HCC): Primary | ICD-10-CM

## 2023-11-08 DIAGNOSIS — J43.1 PANLOBULAR EMPHYSEMA (HCC): ICD-10-CM

## 2023-11-08 DIAGNOSIS — I50.22 CHRONIC SYSTOLIC HF (HEART FAILURE) (HCC): ICD-10-CM

## 2023-11-08 PROCEDURE — 1123F ACP DISCUSS/DSCN MKR DOCD: CPT | Performed by: INTERNAL MEDICINE

## 2023-11-08 PROCEDURE — 99214 OFFICE O/P EST MOD 30 MIN: CPT | Performed by: INTERNAL MEDICINE

## 2023-11-08 ASSESSMENT — ENCOUNTER SYMPTOMS
ABDOMINAL PAIN: 0
SHORTNESS OF BREATH: 0
COUGH: 0
BACK PAIN: 0

## 2023-11-08 NOTE — PROGRESS NOTES
[facility] CARDIOLOGY  [address] [address]      23      NAME:  Regino Danielle  : 1945  MRN: [id number]      SUBJECTIVE:   Regino Danielle is a 70 y.o. female seen for a follow up visit regarding the following:     Chief Complaint   Patient presents with    6 Month Follow-Up         HPI:   She has history of CAD, HFrEF and single lead ICD. Overall she feels well. She is exercising regularly. Patient is asymptomatic. She has been exercising daily. LHC 2019 with stable CAD and 3/3 grafts patent. No CP. Echocardiogram 2023 with ejection fraction 50-55%. She has been stable on medical therapy. No orthopnea, PND or edema. Past Medical History, Past Surgical History, Family history, Social History, and Medications were all reviewed with the patient today and updated as necessary. Current Outpatient Medications   Medication Sig Dispense Refill    amLODIPine (NORVASC) 5 MG tablet TAKE 1 TABLET BY MOUTH DAILY 90 tablet 3    ferrous sulfate (FE TABS 325) 325 (65 Fe) MG EC tablet Take 1 tablet by mouth daily (with breakfast) 90 tablet 3    pantoprazole (PROTONIX) 40 MG tablet Take 1 tablet by mouth daily On empty stomach and wait at least 30 minutes before for stomach protection 90 tablet 2    fluticasone (FLONASE) 50 MCG/ACT nasal spray 2 sprays by Each Nostril route daily For allergies 16 g 5    rosuvastatin (CRESTOR) 20 MG tablet TAKE 1 TABLET BY MOUTH DAILY for cholesterol 90 tablet 3    lisinopril (PRINIVIL;ZESTRIL) 40 MG tablet Take 1 tablet by mouth daily For blood pressure 90 tablet 3    carvedilol (COREG) 25 MG tablet TAKE 1 TABLET BY MOUTH TWICE DAILY WITH MEALS 180 tablet 3    aspirin 81 MG EC tablet Take 1 tablet by mouth daily      vitamin D3 (CHOLECALCIFEROL) 125 MCG (5000 UT) TABS tablet Take 1 tablet by mouth daily       No current facility-administered medications for this visit.      Patient Active Problem List    Diagnosis Date Noted

## 2024-01-15 ENCOUNTER — NURSE ONLY (OUTPATIENT)
Dept: FAMILY MEDICINE CLINIC | Facility: CLINIC | Age: 79
End: 2024-01-15

## 2024-01-15 ENCOUNTER — TELEPHONE (OUTPATIENT)
Age: 79
End: 2024-01-15

## 2024-01-15 ENCOUNTER — TELEPHONE (OUTPATIENT)
Dept: FAMILY MEDICINE CLINIC | Facility: CLINIC | Age: 79
End: 2024-01-15

## 2024-01-15 DIAGNOSIS — E55.9 VITAMIN D DEFICIENCY, UNSPECIFIED: ICD-10-CM

## 2024-01-15 DIAGNOSIS — I11.0 BENIGN HYPERTENSIVE HEART DISEASE WITH CHF (CONGESTIVE HEART FAILURE) (HCC): ICD-10-CM

## 2024-01-15 DIAGNOSIS — Z95.810 AICD (AUTOMATIC CARDIOVERTER/DEFIBRILLATOR) PRESENT: Chronic | ICD-10-CM

## 2024-01-15 DIAGNOSIS — E78.00 PURE HYPERCHOLESTEROLEMIA: ICD-10-CM

## 2024-01-15 DIAGNOSIS — D50.0 IRON DEFICIENCY ANEMIA DUE TO CHRONIC BLOOD LOSS: ICD-10-CM

## 2024-01-15 DIAGNOSIS — R53.83 OTHER FATIGUE: ICD-10-CM

## 2024-01-15 LAB
25(OH)D3 SERPL-MCNC: 45.3 NG/ML (ref 30–100)
ALBUMIN SERPL-MCNC: 3.5 G/DL (ref 3.2–4.6)
ALBUMIN/GLOB SERPL: 1.1 (ref 0.4–1.6)
ALP SERPL-CCNC: 66 U/L (ref 50–136)
ALT SERPL-CCNC: 105 U/L (ref 12–65)
ANION GAP SERPL CALC-SCNC: 2 MMOL/L (ref 2–11)
AST SERPL-CCNC: 104 U/L (ref 15–37)
BASOPHILS # BLD: 0 K/UL (ref 0–0.2)
BASOPHILS NFR BLD: 1 % (ref 0–2)
BILIRUB SERPL-MCNC: 0.6 MG/DL (ref 0.2–1.1)
BUN SERPL-MCNC: 15 MG/DL (ref 8–23)
CALCIUM SERPL-MCNC: 8.9 MG/DL (ref 8.3–10.4)
CHLORIDE SERPL-SCNC: 114 MMOL/L (ref 103–113)
CHOLEST SERPL-MCNC: 130 MG/DL
CO2 SERPL-SCNC: 27 MMOL/L (ref 21–32)
CREAT SERPL-MCNC: 0.8 MG/DL (ref 0.6–1)
DIFFERENTIAL METHOD BLD: ABNORMAL
EOSINOPHIL # BLD: 0.1 K/UL (ref 0–0.8)
EOSINOPHIL NFR BLD: 1 % (ref 0.5–7.8)
ERYTHROCYTE [DISTWIDTH] IN BLOOD BY AUTOMATED COUNT: 14.2 % (ref 11.9–14.6)
GLOBULIN SER CALC-MCNC: 3.1 G/DL (ref 2.8–4.5)
GLUCOSE SERPL-MCNC: 95 MG/DL (ref 65–100)
HCT VFR BLD AUTO: 39.4 % (ref 35.8–46.3)
HDLC SERPL-MCNC: 53 MG/DL (ref 40–60)
HDLC SERPL: 2.5
HGB BLD-MCNC: 12 G/DL (ref 11.7–15.4)
IMM GRANULOCYTES # BLD AUTO: 0 K/UL (ref 0–0.5)
IMM GRANULOCYTES NFR BLD AUTO: 0 % (ref 0–5)
LDLC SERPL CALC-MCNC: 67.2 MG/DL
LYMPHOCYTES # BLD: 1.1 K/UL (ref 0.5–4.6)
LYMPHOCYTES NFR BLD: 26 % (ref 13–44)
MCH RBC QN AUTO: 30.6 PG (ref 26.1–32.9)
MCHC RBC AUTO-ENTMCNC: 30.5 G/DL (ref 31.4–35)
MCV RBC AUTO: 100.5 FL (ref 82–102)
MONOCYTES # BLD: 0.4 K/UL (ref 0.1–1.3)
MONOCYTES NFR BLD: 9 % (ref 4–12)
NEUTS SEG # BLD: 2.7 K/UL (ref 1.7–8.2)
NEUTS SEG NFR BLD: 63 % (ref 43–78)
NRBC # BLD: 0 K/UL (ref 0–0.2)
PLATELET # BLD AUTO: 232 K/UL (ref 150–450)
PMV BLD AUTO: 9.9 FL (ref 9.4–12.3)
POTASSIUM SERPL-SCNC: 3.8 MMOL/L (ref 3.5–5.1)
PROT SERPL-MCNC: 6.6 G/DL (ref 6.3–8.2)
RBC # BLD AUTO: 3.92 M/UL (ref 4.05–5.2)
SODIUM SERPL-SCNC: 143 MMOL/L (ref 136–146)
TRIGL SERPL-MCNC: 49 MG/DL (ref 35–150)
TSH W FREE THYROID IF ABNORMAL: 2.41 UIU/ML (ref 0.36–3.74)
VLDLC SERPL CALC-MCNC: 9.8 MG/DL (ref 6–23)
WBC # BLD AUTO: 4.2 K/UL (ref 4.3–11.1)

## 2024-01-15 NOTE — TELEPHONE ENCOUNTER
Second call attempted no answer and no VMM option    Attempted to call due to tachy rates noted on bio alert,     No answer and no VMM option.

## 2024-01-23 ENCOUNTER — OFFICE VISIT (OUTPATIENT)
Dept: FAMILY MEDICINE CLINIC | Facility: CLINIC | Age: 79
End: 2024-01-23
Payer: MEDICARE

## 2024-01-23 VITALS
BODY MASS INDEX: 22.39 KG/M2 | DIASTOLIC BLOOD PRESSURE: 68 MMHG | HEART RATE: 98 BPM | WEIGHT: 134.4 LBS | SYSTOLIC BLOOD PRESSURE: 120 MMHG | HEIGHT: 65 IN | OXYGEN SATURATION: 97 %

## 2024-01-23 DIAGNOSIS — I11.0 BENIGN HYPERTENSIVE HEART DISEASE WITH CHF (CONGESTIVE HEART FAILURE) (HCC): ICD-10-CM

## 2024-01-23 DIAGNOSIS — Z23 ENCOUNTER FOR IMMUNIZATION: ICD-10-CM

## 2024-01-23 DIAGNOSIS — E78.00 PURE HYPERCHOLESTEROLEMIA: ICD-10-CM

## 2024-01-23 DIAGNOSIS — D50.0 IRON DEFICIENCY ANEMIA DUE TO CHRONIC BLOOD LOSS: ICD-10-CM

## 2024-01-23 DIAGNOSIS — Z00.00 ENCOUNTER FOR ANNUAL WELLNESS VISIT (AWV) IN MEDICARE PATIENT: Primary | ICD-10-CM

## 2024-01-23 DIAGNOSIS — R79.89 ELEVATED LIVER FUNCTION TESTS: ICD-10-CM

## 2024-01-23 LAB
ALBUMIN SERPL-MCNC: 3.4 G/DL (ref 3.2–4.6)
ALBUMIN/GLOB SERPL: 1.1 (ref 0.4–1.6)
ALP SERPL-CCNC: 69 U/L (ref 50–136)
ALT SERPL-CCNC: 63 U/L (ref 12–65)
ANION GAP SERPL CALC-SCNC: 2 MMOL/L (ref 2–11)
AST SERPL-CCNC: 43 U/L (ref 15–37)
BILIRUB SERPL-MCNC: 0.7 MG/DL (ref 0.2–1.1)
BILIRUBIN, URINE, POC: ABNORMAL
BLOOD URINE, POC: NEGATIVE
BUN SERPL-MCNC: 10 MG/DL (ref 8–23)
CALCIUM SERPL-MCNC: 8.5 MG/DL (ref 8.3–10.4)
CHLORIDE SERPL-SCNC: 113 MMOL/L (ref 103–113)
CO2 SERPL-SCNC: 26 MMOL/L (ref 21–32)
CREAT SERPL-MCNC: 0.8 MG/DL (ref 0.6–1)
GLOBULIN SER CALC-MCNC: 3 G/DL (ref 2.8–4.5)
GLUCOSE SERPL-MCNC: 106 MG/DL (ref 65–100)
GLUCOSE URINE, POC: NEGATIVE
HAV IGM SER QL: NONREACTIVE
HCV AB SER QL: NONREACTIVE
KETONES, URINE, POC: ABNORMAL
LEUKOCYTE ESTERASE, URINE, POC: NEGATIVE
NITRITE, URINE, POC: NEGATIVE
PH, URINE, POC: 5.5 (ref 4.6–8)
POTASSIUM SERPL-SCNC: 3.9 MMOL/L (ref 3.5–5.1)
PROT SERPL-MCNC: 6.4 G/DL (ref 6.3–8.2)
PROTEIN,URINE, POC: ABNORMAL
SODIUM SERPL-SCNC: 141 MMOL/L (ref 136–146)
SPECIFIC GRAVITY, URINE, POC: 1.03 (ref 1–1.03)
URINALYSIS CLARITY, POC: ABNORMAL
URINALYSIS COLOR, POC: ABNORMAL
UROBILINOGEN, POC: ABNORMAL

## 2024-01-23 PROCEDURE — 1123F ACP DISCUSS/DSCN MKR DOCD: CPT | Performed by: NURSE PRACTITIONER

## 2024-01-23 PROCEDURE — G0439 PPPS, SUBSEQ VISIT: HCPCS | Performed by: NURSE PRACTITIONER

## 2024-01-23 RX ORDER — ROSUVASTATIN CALCIUM 20 MG/1
TABLET, COATED ORAL
Qty: 90 TABLET | Refills: 3 | Status: SHIPPED | OUTPATIENT
Start: 2024-01-23

## 2024-01-23 RX ORDER — PANTOPRAZOLE SODIUM 40 MG/1
40 TABLET, DELAYED RELEASE ORAL DAILY
Qty: 90 TABLET | Refills: 2 | Status: SHIPPED | OUTPATIENT
Start: 2024-01-23

## 2024-01-23 RX ORDER — LISINOPRIL 40 MG/1
40 TABLET ORAL DAILY
Qty: 90 TABLET | Refills: 3 | Status: SHIPPED | OUTPATIENT
Start: 2024-01-23

## 2024-01-23 RX ORDER — LANOLIN ALCOHOL/MO/W.PET/CERES
325 CREAM (GRAM) TOPICAL
Qty: 90 TABLET | Refills: 3 | Status: SHIPPED | OUTPATIENT
Start: 2024-01-23

## 2024-01-23 RX ORDER — CARVEDILOL 25 MG/1
25 TABLET ORAL 2 TIMES DAILY WITH MEALS
Qty: 180 TABLET | Refills: 3 | Status: SHIPPED | OUTPATIENT
Start: 2024-01-23

## 2024-01-23 ASSESSMENT — ENCOUNTER SYMPTOMS
BACK PAIN: 0
COLOR CHANGE: 0
WHEEZING: 0
GASTROINTESTINAL NEGATIVE: 1
APNEA: 0
RHINORRHEA: 0
VOICE CHANGE: 0
EYE PAIN: 0
RECTAL PAIN: 0
EYES NEGATIVE: 1
SORE THROAT: 0
PHOTOPHOBIA: 0
CHEST TIGHTNESS: 0
FACIAL SWELLING: 0
DIARRHEA: 0
COUGH: 0
BLOOD IN STOOL: 0
ABDOMINAL DISTENTION: 0
EYE ITCHING: 0
TROUBLE SWALLOWING: 0
VOMITING: 0
RESPIRATORY NEGATIVE: 1
EYE REDNESS: 0
CHOKING: 0
ABDOMINAL PAIN: 0
EYE DISCHARGE: 0
SINUS PAIN: 0
ALLERGIC/IMMUNOLOGIC NEGATIVE: 1
SINUS PRESSURE: 0
STRIDOR: 0
SHORTNESS OF BREATH: 0
CONSTIPATION: 0
ANAL BLEEDING: 0
NAUSEA: 0

## 2024-01-23 ASSESSMENT — PATIENT HEALTH QUESTIONNAIRE - PHQ9
5. POOR APPETITE OR OVEREATING: 0
SUM OF ALL RESPONSES TO PHQ QUESTIONS 1-9: 1
10. IF YOU CHECKED OFF ANY PROBLEMS, HOW DIFFICULT HAVE THESE PROBLEMS MADE IT FOR YOU TO DO YOUR WORK, TAKE CARE OF THINGS AT HOME, OR GET ALONG WITH OTHER PEOPLE: 0
SUM OF ALL RESPONSES TO PHQ QUESTIONS 1-9: 1
4. FEELING TIRED OR HAVING LITTLE ENERGY: 0
6. FEELING BAD ABOUT YOURSELF - OR THAT YOU ARE A FAILURE OR HAVE LET YOURSELF OR YOUR FAMILY DOWN: 0
9. THOUGHTS THAT YOU WOULD BE BETTER OFF DEAD, OR OF HURTING YOURSELF: 0
2. FEELING DOWN, DEPRESSED OR HOPELESS: 0
SUM OF ALL RESPONSES TO PHQ QUESTIONS 1-9: 1
1. LITTLE INTEREST OR PLEASURE IN DOING THINGS: 0
8. MOVING OR SPEAKING SO SLOWLY THAT OTHER PEOPLE COULD HAVE NOTICED. OR THE OPPOSITE, BEING SO FIGETY OR RESTLESS THAT YOU HAVE BEEN MOVING AROUND A LOT MORE THAN USUAL: 0
3. TROUBLE FALLING OR STAYING ASLEEP: 1
SUM OF ALL RESPONSES TO PHQ QUESTIONS 1-9: 1
SUM OF ALL RESPONSES TO PHQ9 QUESTIONS 1 & 2: 0
7. TROUBLE CONCENTRATING ON THINGS, SUCH AS READING THE NEWSPAPER OR WATCHING TELEVISION: 0

## 2024-01-23 NOTE — PROGRESS NOTES
repair 3/17/2016    Tobacco use disorder      Past Surgical History:   Procedure Laterality Date    CARDIAC CATHETERIZATION      PCI  x 1 - pt does not have stent card     SECTION      x2    CORONARY ARTERY BYPASS GRAFT      MITRALPLASTY W CP BYPASS      with repair device    PACEMAKER      TUBAL LIGATION      UPPER GASTROINTESTINAL ENDOSCOPY N/A 10/27/2022    EGD BIOPSY performed by Hank Flores MD at [facility] ENDOSCOPY    UPPER GASTROINTESTINAL ENDOSCOPY N/A 2023    EGD ESOPHAGOGASTRODUODENOSCOPY ER 3/ assigned 5th floor *HGB recheck performed by Rene Lynn MD at [facility] ENDOSCOPY     Family History   Problem Relation Age of Onset    Heart Surgery Brother     Heart Disease Sister     Heart Disease Father     Heart Attack Father     Breast Cancer Neg Hx     Coronary Art Dis Other     No Known Problems Mother     Heart Disease Brother      Social History     Tobacco Use    Smoking status: Former     Current packs/day: 0.00     Average packs/day: 0.3 packs/day for 34.5 years (8.6 ttl pk-yrs)     Types: Cigarettes     Start date: 1975     Quit date: 2009     Years since quittin.5    Smokeless tobacco: Never    Tobacco comments:     Quit smokin09   Substance Use Topics    Alcohol use: No         REVIEW OF SYSTEM    Review of Systems   Constitutional: Negative.  Negative for activity change, appetite change, chills, diaphoresis, fatigue, fever and unexpected weight change.   HENT: Negative.  Negative for congestion, dental problem, drooling, ear discharge, ear pain, facial swelling, hearing loss, mouth sores, nosebleeds, postnasal drip, rhinorrhea, sinus pressure, sinus pain, sneezing, sore throat, tinnitus, trouble swallowing and voice change.    Eyes: Negative.  Negative for photophobia, pain, discharge, redness, itching and visual disturbance.   Respiratory: Negative.  Negative for apnea, cough, choking, chest tightness, shortness of breath, wheezing and stridor.    Cardiovascular:

## 2024-01-23 NOTE — PATIENT INSTRUCTIONS

## 2024-01-25 LAB — HBV CORE IGM SERPL QL IA: NEGATIVE

## 2024-05-23 ENCOUNTER — OFFICE VISIT (OUTPATIENT)
Age: 79
End: 2024-05-23
Payer: MEDICARE

## 2024-05-23 VITALS
HEIGHT: 65 IN | HEART RATE: 78 BPM | DIASTOLIC BLOOD PRESSURE: 74 MMHG | SYSTOLIC BLOOD PRESSURE: 132 MMHG | BODY MASS INDEX: 20.96 KG/M2 | WEIGHT: 125.8 LBS

## 2024-05-23 DIAGNOSIS — I65.23 BILATERAL CAROTID ARTERY STENOSIS WITHOUT CEREBRAL INFARCTION: ICD-10-CM

## 2024-05-23 DIAGNOSIS — I50.22 CHRONIC SYSTOLIC HF (HEART FAILURE) (HCC): ICD-10-CM

## 2024-05-23 DIAGNOSIS — I25.118 ATHEROSCLEROSIS OF NATIVE CORONARY ARTERY OF NATIVE HEART WITH STABLE ANGINA PECTORIS (HCC): ICD-10-CM

## 2024-05-23 DIAGNOSIS — I10 PRIMARY HYPERTENSION: Primary | ICD-10-CM

## 2024-05-23 DIAGNOSIS — E78.00 PURE HYPERCHOLESTEROLEMIA: ICD-10-CM

## 2024-05-23 DIAGNOSIS — I11.0 BENIGN HYPERTENSIVE HEART DISEASE WITH CHF (CONGESTIVE HEART FAILURE) (HCC): ICD-10-CM

## 2024-05-23 DIAGNOSIS — J43.1 PANLOBULAR EMPHYSEMA (HCC): ICD-10-CM

## 2024-05-23 DIAGNOSIS — Z95.810 AICD (AUTOMATIC CARDIOVERTER/DEFIBRILLATOR) PRESENT: Chronic | ICD-10-CM

## 2024-05-23 DIAGNOSIS — Z98.890 S/P MITRAL VALVE REPAIR: Chronic | ICD-10-CM

## 2024-05-23 PROCEDURE — 93000 ELECTROCARDIOGRAM COMPLETE: CPT | Performed by: INTERNAL MEDICINE

## 2024-05-23 PROCEDURE — 3075F SYST BP GE 130 - 139MM HG: CPT | Performed by: INTERNAL MEDICINE

## 2024-05-23 PROCEDURE — 99214 OFFICE O/P EST MOD 30 MIN: CPT | Performed by: INTERNAL MEDICINE

## 2024-05-23 PROCEDURE — 3078F DIAST BP <80 MM HG: CPT | Performed by: INTERNAL MEDICINE

## 2024-05-23 PROCEDURE — 1123F ACP DISCUSS/DSCN MKR DOCD: CPT | Performed by: INTERNAL MEDICINE

## 2024-05-23 ASSESSMENT — ENCOUNTER SYMPTOMS
BACK PAIN: 0
COUGH: 0
SHORTNESS OF BREATH: 0
ABDOMINAL PAIN: 0

## 2024-05-23 NOTE — PROGRESS NOTES
Active Problem List    Diagnosis Date Noted    Syncope and collapse 10/25/2022     Priority: Medium    COVID-19 with multiple comorbidities 10/25/2022     Priority: Medium    Iron deficiency anemia due to chronic blood loss 10/14/2019     Priority: Low    Panlobular emphysema ([facility]) 02/13/2019     Priority: Low    Hypoxia 02/01/2019     Priority: Low    Pure hypercholesterolemia 07/12/2016     Priority: Low    Chronic systolic HF (heart failure) ([facility]) 03/17/2016     Priority: Low     0/2009 - EF 30-35%.  02/2010 - Single leadICD - [employer]  02/2012:  EF 45-50%  03/2013:  EF 40-45%  09/2013:  EF 45%  09/2014:  EF 45%  09/2015:  EF 40-45%   09/2016:  EF 50-55%  01/2018:  EF 50%   01/2019:  EF 35%   03/2020:  EF 30-35%   03/2021:  EF 35-40%   04/2022;  Ef 35-40%  04/2023:  EF 50-55%  05/2024:  EF 40-45%              S/P mitral valve repair 03/17/2016     Priority: Low     02/2009 - MV repair  01/2018:  Normal function - Mild MR  01/2019:  Mild to moderate MR  04/2022:  Mild to moderate MR  04/2023:  Mild MR        Carotid artery stenosis without cerebral infarction 12/23/2015     Priority: Low     02/2014:  Class B bilaterally  09/2015:  < 50% bilaterally  09/2016:  < 50% bilateraly  10/2022:  < 50% bilateraly          AICD (automatic cardioverter/defibrillator) present 12/23/2015     Priority: Low    Benign hypertensive heart disease with CHF (congestive heart failure) ([facility]) 12/23/2015     Priority: Low     Last Assessment & Plan:   Formatting of this note is different from the original.  This condition is managed by Specialist.  Key CAD CHF Meds         lisinopril (PRINIVIL, ZESTRIL) 40 mg tablet  (Taking) Take 1 Tab by mouth daily.    carvedilol (COREG) 25 mg tablet  (Taking) Take 1 Tab by mouth two (2) times daily (with meals).    amLODIPine (NORVASC) 5 mg tablet  (Taking) Take 1 Tab by mouth daily.    aspirin delayed-release 81 mg tablet  (Taking) take 1 Tab by mouth daily.    lisinopril (PRINIVIL, ZESTRIL) 40 mg  Body Location Override (Optional - Billing Will Still Be Based On Selected Body Map Location If Applicable): left nare

## 2024-07-09 DIAGNOSIS — D50.0 IRON DEFICIENCY ANEMIA DUE TO CHRONIC BLOOD LOSS: ICD-10-CM

## 2024-07-09 DIAGNOSIS — E78.00 PURE HYPERCHOLESTEROLEMIA: ICD-10-CM

## 2024-07-09 RX ORDER — ROSUVASTATIN CALCIUM 20 MG/1
TABLET, COATED ORAL
Qty: 90 TABLET | Refills: 3 | Status: SHIPPED | OUTPATIENT
Start: 2024-07-09

## 2024-07-09 RX ORDER — PANTOPRAZOLE SODIUM 40 MG/1
40 TABLET, DELAYED RELEASE ORAL DAILY
Qty: 90 TABLET | Refills: 2 | Status: SHIPPED | OUTPATIENT
Start: 2024-07-09

## 2024-07-09 RX ORDER — AMLODIPINE BESYLATE 5 MG/1
5 TABLET ORAL DAILY
Qty: 90 TABLET | Refills: 3 | Status: SHIPPED | OUTPATIENT
Start: 2024-07-09

## 2024-07-23 ENCOUNTER — LAB (OUTPATIENT)
Dept: FAMILY MEDICINE CLINIC | Facility: CLINIC | Age: 79
End: 2024-07-23

## 2024-07-23 DIAGNOSIS — R73.09 OTHER ABNORMAL GLUCOSE: ICD-10-CM

## 2024-07-23 DIAGNOSIS — E55.9 VITAMIN D DEFICIENCY, UNSPECIFIED: ICD-10-CM

## 2024-07-23 DIAGNOSIS — R53.83 OTHER FATIGUE: ICD-10-CM

## 2024-07-23 DIAGNOSIS — D50.0 IRON DEFICIENCY ANEMIA DUE TO CHRONIC BLOOD LOSS: ICD-10-CM

## 2024-07-23 DIAGNOSIS — R25.2 CRAMP AND SPASM: ICD-10-CM

## 2024-07-23 DIAGNOSIS — E78.00 PURE HYPERCHOLESTEROLEMIA: Primary | ICD-10-CM

## 2024-07-23 LAB
25(OH)D3 SERPL-MCNC: 43.1 NG/ML (ref 30–100)
ALBUMIN SERPL-MCNC: 4 G/DL (ref 3.2–4.6)
ALBUMIN/GLOB SERPL: 1.1 (ref 1–1.9)
ALP SERPL-CCNC: 62 U/L (ref 35–104)
ALT SERPL-CCNC: 10 U/L (ref 12–65)
ANION GAP SERPL CALC-SCNC: 10 MMOL/L (ref 9–18)
AST SERPL-CCNC: 23 U/L (ref 15–37)
BASOPHILS # BLD: 0 K/UL (ref 0–0.2)
BASOPHILS NFR BLD: 1 % (ref 0–2)
BILIRUB SERPL-MCNC: 0.5 MG/DL (ref 0–1.2)
BUN SERPL-MCNC: 13 MG/DL (ref 8–23)
CALCIUM SERPL-MCNC: 9.4 MG/DL (ref 8.8–10.2)
CHLORIDE SERPL-SCNC: 107 MMOL/L (ref 98–107)
CHOLEST SERPL-MCNC: 147 MG/DL (ref 0–200)
CO2 SERPL-SCNC: 26 MMOL/L (ref 20–28)
CREAT SERPL-MCNC: 0.81 MG/DL (ref 0.6–1.1)
DIFFERENTIAL METHOD BLD: ABNORMAL
EOSINOPHIL # BLD: 0.1 K/UL (ref 0–0.8)
EOSINOPHIL NFR BLD: 2 % (ref 0.5–7.8)
ERYTHROCYTE [DISTWIDTH] IN BLOOD BY AUTOMATED COUNT: 14.8 % (ref 11.9–14.6)
EST. AVERAGE GLUCOSE BLD GHB EST-MCNC: 107 MG/DL
GLOBULIN SER CALC-MCNC: 3.6 G/DL (ref 2.3–3.5)
GLUCOSE SERPL-MCNC: 86 MG/DL (ref 70–99)
HBA1C MFR BLD: 5.4 % (ref 0–5.6)
HCT VFR BLD AUTO: 37.9 % (ref 35.8–46.3)
HDLC SERPL-MCNC: 57 MG/DL (ref 40–60)
HDLC SERPL: 2.6 (ref 0–5)
HGB BLD-MCNC: 11.1 G/DL (ref 11.7–15.4)
IMM GRANULOCYTES # BLD AUTO: 0 K/UL (ref 0–0.5)
IMM GRANULOCYTES NFR BLD AUTO: 1 % (ref 0–5)
LDLC SERPL CALC-MCNC: 79 MG/DL (ref 0–100)
LYMPHOCYTES # BLD: 1.1 K/UL (ref 0.5–4.6)
LYMPHOCYTES NFR BLD: 26 % (ref 13–44)
MAGNESIUM SERPL-MCNC: 2 MG/DL (ref 1.8–2.4)
MCH RBC QN AUTO: 29.1 PG (ref 26.1–32.9)
MCHC RBC AUTO-ENTMCNC: 29.3 G/DL (ref 31.4–35)
MCV RBC AUTO: 99.2 FL (ref 82–102)
MONOCYTES # BLD: 0.4 K/UL (ref 0.1–1.3)
MONOCYTES NFR BLD: 9 % (ref 4–12)
NEUTS SEG # BLD: 2.6 K/UL (ref 1.7–8.2)
NEUTS SEG NFR BLD: 61 % (ref 43–78)
NRBC # BLD: 0 K/UL (ref 0–0.2)
PLATELET # BLD AUTO: 211 K/UL (ref 150–450)
PMV BLD AUTO: 10.1 FL (ref 9.4–12.3)
POTASSIUM SERPL-SCNC: 3.6 MMOL/L (ref 3.5–5.1)
PROT SERPL-MCNC: 7.6 G/DL (ref 6.3–8.2)
RBC # BLD AUTO: 3.82 M/UL (ref 4.05–5.2)
SODIUM SERPL-SCNC: 143 MMOL/L (ref 136–145)
TRIGL SERPL-MCNC: 54 MG/DL (ref 0–150)
VIT B12 SERPL-MCNC: 978 PG/ML (ref 193–986)
VLDLC SERPL CALC-MCNC: 11 MG/DL (ref 6–23)
WBC # BLD AUTO: 4.2 K/UL (ref 4.3–11.1)

## 2024-07-30 ENCOUNTER — OFFICE VISIT (OUTPATIENT)
Dept: FAMILY MEDICINE CLINIC | Facility: CLINIC | Age: 79
End: 2024-07-30
Payer: MEDICARE

## 2024-07-30 VITALS
RESPIRATION RATE: 16 BRPM | DIASTOLIC BLOOD PRESSURE: 75 MMHG | BODY MASS INDEX: 21.66 KG/M2 | OXYGEN SATURATION: 99 % | TEMPERATURE: 97.1 F | WEIGHT: 130 LBS | SYSTOLIC BLOOD PRESSURE: 129 MMHG | HEIGHT: 65 IN | HEART RATE: 69 BPM

## 2024-07-30 DIAGNOSIS — E78.00 PURE HYPERCHOLESTEROLEMIA: ICD-10-CM

## 2024-07-30 DIAGNOSIS — E55.9 VITAMIN D DEFICIENCY, UNSPECIFIED: ICD-10-CM

## 2024-07-30 DIAGNOSIS — Z23 ENCOUNTER FOR IMMUNIZATION: ICD-10-CM

## 2024-07-30 DIAGNOSIS — D50.0 IRON DEFICIENCY ANEMIA DUE TO CHRONIC BLOOD LOSS: Primary | ICD-10-CM

## 2024-07-30 DIAGNOSIS — I11.0 BENIGN HYPERTENSIVE HEART DISEASE WITH CHF (CONGESTIVE HEART FAILURE) (HCC): ICD-10-CM

## 2024-07-30 PROCEDURE — 90471 IMMUNIZATION ADMIN: CPT | Performed by: NURSE PRACTITIONER

## 2024-07-30 PROCEDURE — 99214 OFFICE O/P EST MOD 30 MIN: CPT | Performed by: NURSE PRACTITIONER

## 2024-07-30 PROCEDURE — 90715 TDAP VACCINE 7 YRS/> IM: CPT | Performed by: NURSE PRACTITIONER

## 2024-07-30 PROCEDURE — 1123F ACP DISCUSS/DSCN MKR DOCD: CPT | Performed by: NURSE PRACTITIONER

## 2024-07-30 RX ORDER — VITAMIN B COMPLEX
1 CAPSULE ORAL DAILY
COMMUNITY

## 2024-07-30 RX ORDER — FERROUS SULFATE 325(65) MG
325 TABLET, DELAYED RELEASE (ENTERIC COATED) ORAL 2 TIMES DAILY
Qty: 180 TABLET | Refills: 3 | Status: SHIPPED | OUTPATIENT
Start: 2024-07-30

## 2024-07-30 SDOH — ECONOMIC STABILITY: INCOME INSECURITY: HOW HARD IS IT FOR YOU TO PAY FOR THE VERY BASICS LIKE FOOD, HOUSING, MEDICAL CARE, AND HEATING?: NOT VERY HARD

## 2024-07-30 SDOH — ECONOMIC STABILITY: FOOD INSECURITY: WITHIN THE PAST 12 MONTHS, THE FOOD YOU BOUGHT JUST DIDN'T LAST AND YOU DIDN'T HAVE MONEY TO GET MORE.: NEVER TRUE

## 2024-07-30 SDOH — ECONOMIC STABILITY: FOOD INSECURITY: WITHIN THE PAST 12 MONTHS, YOU WORRIED THAT YOUR FOOD WOULD RUN OUT BEFORE YOU GOT MONEY TO BUY MORE.: NEVER TRUE

## 2024-08-07 ASSESSMENT — ENCOUNTER SYMPTOMS
STRIDOR: 0
NAUSEA: 0
BLOOD IN STOOL: 0
COLOR CHANGE: 0
EYE DISCHARGE: 0
DIARRHEA: 0
COUGH: 0
RHINORRHEA: 0
CHEST TIGHTNESS: 0
APNEA: 0
FACIAL SWELLING: 0
EYE PAIN: 0
ALLERGIC/IMMUNOLOGIC NEGATIVE: 1
SORE THROAT: 0
TROUBLE SWALLOWING: 0
CHOKING: 0
GASTROINTESTINAL NEGATIVE: 1
BACK PAIN: 0
VOMITING: 0
ANAL BLEEDING: 0
SINUS PAIN: 0
WHEEZING: 0
EYE ITCHING: 0
CONSTIPATION: 0
PHOTOPHOBIA: 0
EYES NEGATIVE: 1
ABDOMINAL PAIN: 0
RECTAL PAIN: 0
SINUS PRESSURE: 0
VOICE CHANGE: 0
SHORTNESS OF BREATH: 0
EYE REDNESS: 0
ABDOMINAL DISTENTION: 0
RESPIRATORY NEGATIVE: 1

## 2024-08-08 NOTE — PROGRESS NOTES
11.1.  Slightly decreased since last lab draw.  Advised patient to continue with iron supplementation twice daily as currently prescribed.  Will plan to recheck labs in about 1 month for reevaluation.  Patient reports no signs or symptoms of bleeding.    2. Encounter for immunization  -     Tdap, BOOSTRIX, (age 10 yrs+), IM    Patient is due for Tdap vaccine. Discussed current CDC recommendation for immunization. Pt is amenable to receiving vaccine today in office. VIS provided. Vaccine administered.     3. Pure hypercholesterolemia   Stable.  Continue current therapy.    4. Benign hypertensive heart disease with CHF (congestive heart failure) (HCC)   Stable.  Per cardiology recommendation.  We appreciate their assistance.    5. Vitamin D deficiency, unspecified   Stable.  Continue current therapy.    Orders Placed This Encounter   Procedures    Tdap, BOOSTRIX, (age 10 yrs+), IM    CBC with Auto Differential     Standing Status:   Future     Standing Expiration Date:   8/7/2025    Ferritin     Standing Status:   Future     Standing Expiration Date:   8/7/2025    Transferrin     Standing Status:   Future     Standing Expiration Date:   8/7/2025    Iron     Standing Status:   Future     Standing Expiration Date:   8/7/2025         Elements of this note have been dictated using speech recognition software. As a result, errors of speech recognition may have occurred.      On this date 7/30/2024 I have spent 31 minutes reviewing previous notes, test results and face to face with the patient discussing the diagnosis and importance of compliance with the treatment plan as well as documenting on the day of the visit. Greater than 50% of this visit was spent counseling the patient about test results, prognosis, importance of compliance, education about disease process, benefits of medications, instructions for management of acute symptoms, and follow up plans.    Return in about 4 weeks (around 8/27/2024) for lab only then 6

## 2024-08-27 ENCOUNTER — LAB (OUTPATIENT)
Dept: FAMILY MEDICINE CLINIC | Facility: CLINIC | Age: 79
End: 2024-08-27

## 2024-08-27 DIAGNOSIS — D50.0 IRON DEFICIENCY ANEMIA DUE TO CHRONIC BLOOD LOSS: ICD-10-CM

## 2024-08-27 LAB
BASOPHILS # BLD: 0 K/UL (ref 0–0.2)
BASOPHILS NFR BLD: 1 % (ref 0–2)
DIFFERENTIAL METHOD BLD: ABNORMAL
EOSINOPHIL # BLD: 0.1 K/UL (ref 0–0.8)
EOSINOPHIL NFR BLD: 2 % (ref 0.5–7.8)
ERYTHROCYTE [DISTWIDTH] IN BLOOD BY AUTOMATED COUNT: 14.7 % (ref 11.9–14.6)
FERRITIN SERPL-MCNC: 43 NG/ML (ref 8–388)
HCT VFR BLD AUTO: 35.9 % (ref 35.8–46.3)
HGB BLD-MCNC: 11.1 G/DL (ref 11.7–15.4)
IMM GRANULOCYTES # BLD AUTO: 0 K/UL (ref 0–0.5)
IMM GRANULOCYTES NFR BLD AUTO: 0 % (ref 0–5)
IRON SERPL-MCNC: 30 UG/DL (ref 35–100)
LYMPHOCYTES # BLD: 0.9 K/UL (ref 0.5–4.6)
LYMPHOCYTES NFR BLD: 24 % (ref 13–44)
MCH RBC QN AUTO: 30.4 PG (ref 26.1–32.9)
MCHC RBC AUTO-ENTMCNC: 30.9 G/DL (ref 31.4–35)
MCV RBC AUTO: 98.4 FL (ref 82–102)
MONOCYTES # BLD: 0.3 K/UL (ref 0.1–1.3)
MONOCYTES NFR BLD: 8 % (ref 4–12)
NEUTS SEG # BLD: 2.5 K/UL (ref 1.7–8.2)
NEUTS SEG NFR BLD: 65 % (ref 43–78)
NRBC # BLD: 0 K/UL (ref 0–0.2)
PLATELET # BLD AUTO: 202 K/UL (ref 150–450)
PMV BLD AUTO: 10.2 FL (ref 9.4–12.3)
RBC # BLD AUTO: 3.65 M/UL (ref 4.05–5.2)
TRANSFERRIN SERPL-MCNC: 197 MG/DL (ref 200–360)
WBC # BLD AUTO: 3.9 K/UL (ref 4.3–11.1)

## 2024-09-03 NOTE — H&P
and amlodipine     Panlobular emphysema - stable. Anticipated discharge needs:         Diet: ADULT DIET; Regular; 4 carb choices (60 gm/meal); Low Fat/Low Chol/High Fiber/DARY  VTE ppx: SCD  Code status: Full Code    Hospital Problems:  Principal Problem:    Syncope and collapse  Active Problems:    COVID-19 with multiple comorbidities    S/P CABG (coronary artery bypass graft)    Carotid artery stenosis without cerebral infarction    Chronic systolic HF (heart failure) ([facility])    S/P mitral valve repair    Iron deficiency anemia due to chronic blood loss    Panlobular emphysema (HCC)    AICD (automatic cardioverter/defibrillator) present    Pure hypercholesterolemia    Coronary atherosclerosis of native coronary artery    Benign hypertensive heart disease with CHF (congestive heart failure) ([facility] [address]  Resolved Problems:    * No resolved hospital problems. *       Past History:     Past Medical History:   Diagnosis Date    Acute mitral regurgitation     AICD (automatic cardioverter/defibrillator) present 12/23/2015    Anterior myocardial infarction [facility])     Arrhythmia     Benign hypertensive heart disease with CHF (congestive heart failure) ([facility] [address] 12/23/2015    CAD (coronary artery disease)     [id number]; PC 1999I; Ischemic CM EF 25%    Carotid artery stenosis without cerebral infarction 12/23/2015    Chronic ischemic heart disease 12/23/2015    Chronic systolic HF (heart failure) ([facility] [address] 3/17/2016    0/2009 - EF 30-35%.  02/2010 - Single leadICD - [employer] 02/2012:  EF 45-50% 03/2013:  EF 40-45% 09/2013:  EF 45% 09/2014:  EF 45% 09/2015:  EF 40-45%    Coronary atherosclerosis of native coronary artery     Dyspnea on exertion     GERD (gastroesophageal reflux disease)     GI bleed 6/30/2009    Hyperlipidemia     Hypertension     Influenza A (H5N1) 2/1/2019    Menopause     54's    OA (osteoarthritis)     PUD (peptic ulcer disease)     hx ulcer  dx 6/2009    S/P mitral valve repair 3/17/2016    Tobacco use disorder Past Surgical History:   Procedure Laterality Date    CARDIAC CATHETERIZATION      PCI  x 1 - pt does not have stent card     SECTION      x2    CORONARY ARTERY BYPASS GRAFT      MITRALPLASTY W CP BYPASS      with repair device    PACEMAKER      TUBAL LIGATION          Social History     Tobacco Use    Smoking status: Former     Packs/day: 0.25     Types: Cigarettes     Start date: 1975     Quit date: 2009     Years since quittin.3    Smokeless tobacco: Never    Tobacco comments:     Quit smokin09   Substance Use Topics    Alcohol use: No      Social History     Substance and Sexual Activity   Drug Use Yes    Types: Prescription       Family History   Problem Relation Age of Onset    Heart Surgery Brother     Heart Disease Sister     Heart Disease Father     Heart Attack Father     Breast Cancer Neg Hx     Coronary Art Dis Other     No Known Problems Mother     Heart Disease Brother         Immunization History   Administered Date(s) Administered    COVID-19, MODERNA BLUE border, Primary or Immunocompromised, (age 12y+), IM, 100 mcg/0.5mL 2021, 2021, 2021    Influenza Trivalent 2015, 2017    Influenza Virus Vaccine 2015, 2017, 2019    Influenza, FLUARIX, FLULAVAL, FLUZONE (age 10 mo+) AND AFLURIA, (age 1 y+), PF, 0.5mL 2019    Influenza, High Dose (Fluzone 65 yrs and older) 10/14/2018    Pneumococcal Conjugate 13-valent ([id number]) 2018    Pneumococcal Polysaccharide ([id number]) 2013    Zoster Recombinant (Shingrix) 2019     Allergies   Allergen Reactions    Influenza Virus Vaccine Other (See Comments)     Prior to Admit Medications:  Current Outpatient Medications   Medication Instructions    amLODIPine (NORVASC) 5 MG tablet TAKE 1 TABLET BY MOUTH DAILY    aspirin 81 mg, Oral, DAILY    carvedilol (COREG) 25 MG tablet TAKE 1 TABLET BY MOUTH TWICE DAILY WITH MEALS    ferrous sulfate (FE TABS 325) 325 mg, Oral, 2 TIMES DAILY    lisinopril (PRINIVIL;ZESTRIL) 40 mg, Oral, DAILY    rosuvastatin (CRESTOR) 20 MG tablet TAKE 1 TABLET BY MOUTH DAILY    vitamin D3 (CHOLECALCIFEROL) 5,000 Units, Oral, DAILY         Objective:   Patient Vitals for the past 24 hrs:   Temp Pulse Resp BP SpO2   10/25/22 1918 -- (!) 104 21 120/66 95 %   10/25/22 1830 -- 87 17 (!) 114/48 95 %   10/25/22 1800 -- 83 28 123/63 95 %   10/25/22 1730 -- 81 26 119/64 93 %   10/25/22 1715 -- 88 15 115/62 96 %   10/25/22 1700 -- 93 15 102/65 93 %   10/25/22 1658 -- 91 14 128/65 94 %   10/25/22 1656 -- 93 23 124/60 95 %   10/25/22 1552 99.9 °F (37.7 °C) 93 16 (!) 113/46 94 %       Oxygen Therapy  SpO2: 95 %  O2 Device: None (Room air)    Estimated body mass index is 24.96 kg/m² as calculated from the following:    Height as of this encounter: 5' 5\" (1.651 m). Weight as of this encounter: 150 lb (68 kg). No intake or output data in the 24 hours ending 10/25/22 1958      Physical Exam:    Blood pressure 120/66, pulse (!) 104, temperature 99.9 °F (37.7 °C), temperature source Oral, resp. rate 21, height 5' 5\" (1.651 m), weight 150 lb (68 kg), SpO2 95 %. General:    Well nourished elderly female in NAD   Head:  Normocephalic, atraumatic  Eyes:  Sclerae appear normal.  Pupils equally round. ENT:  Nares appear normal, no drainage. Moist oral mucosa  Neck:  No restricted ROM. Trachea midline   CV:   Tachycardic rate. Regular rhythm. No m/r/g. No jugular venous distension. Lungs:   CTAB. No wheezing, rhonchi, or rales. Symmetric expansion. Abdomen: Bowel sounds present. Soft, nontender, nondistended. Extremities: No cyanosis or clubbing. No edema  Skin:     No rashes and normal coloration. Warm and dry. Neuro:  CN II-XII grossly intact. Sensation intact. A&Ox3  Psych:  Normal mood and affect.       I have personally reviewed labs and tests showing:  Recent Labs:  Recent Results (from the past 24 hour(s))   EKG 12 Lead    Collection Time: 10/25/22  3:59 PM   Result Value Ref Range    Ventricular Rate 79 BPM    Atrial Rate 0 BPM    P-R Interval 171 ms    QRS Duration 132 ms    Q-T Interval 401 ms    QTc Calculation (Bazett) 460 ms    P Axis -53 degrees    R Axis 6 degrees    T Axis 43 degrees    Diagnosis Sinus or ectopic atrial rhythm    CBC with Auto Differential    Collection Time: 10/25/22  4:55 PM   Result Value Ref Range    WBC 10.5 4.3 - 11.1 K/uL    RBC 3.62 (L) 4.05 - 5.2 M/uL    Hemoglobin 9.3 (L) 11.7 - 15.4 g/dL    Hematocrit 31.4 (L) 35.8 - 46.3 %    MCV 86.7 82 - 102 FL    MCH 25.7 (L) 26.1 - 32.9 PG    MCHC 29.6 (L) 31.4 - 35.0 g/dL    RDW 15.1 (H) 11.9 - 14.6 %    Platelets 199 665 - 922 K/uL    MPV 9.9 9.4 - 12.3 FL    nRBC 0.00 0.0 - 0.2 K/uL    Differential Type AUTOMATED      Seg Neutrophils 80 (H) 43 - 78 %    Lymphocytes 11 (L) 13 - 44 %    Monocytes 8 4.0 - 12.0 %    Eosinophils % 0 (L) 0.5 - 7.8 %    Basophils 0 0.0 - 2.0 %    Immature Granulocytes 1 0.0 - 5.0 %    Segs Absolute 8.4 (H) 1.7 - 8.2 K/UL    Absolute Lymph # 1.2 0.5 - 4.6 K/UL    Absolute Mono # 0.8 0.1 - 1.3 K/UL    Absolute Eos # 0.0 0.0 - 0.8 K/UL    Basophils Absolute 0.0 0.0 - 0.2 K/UL    Absolute Immature Granulocyte 0.1 0.0 - 0.5 K/UL   Comprehensive Metabolic Panel    Collection Time: 10/25/22  4:55 PM   Result Value Ref Range    Sodium 141 133 - 143 mmol/L    Potassium 3.5 3.5 - 5.1 mmol/L    Chloride 109 101 - 110 mmol/L    CO2 24 21 - 32 mmol/L    Anion Gap 8 2 - 11 mmol/L    Glucose 109 (H) 65 - 100 mg/dL    BUN 16 8 - 23 MG/DL    Creatinine 0.90 0.6 - 1.0 MG/DL    Est, Glom Filt Rate >60 >60 ml/min/1.73m2    Calcium 9.5 8.3 - 10.4 MG/DL    Total Bilirubin 0.5 0.2 - 1.1 MG/DL    ALT 16 12 - 65 U/L    AST 18 15 - 37 U/L    Alk Phosphatase 64 50 - 136 U/L    Total Protein 8.3 (H) 6.3 - 8.2 g/dL    Albumin 3.7 3.2 - 4.6 g/dL    Globulin 4.6 (H) 2.8 - 4.5 g/dL    Albumin/Globulin Ratio 0.8 0.4 - 1.6     D-Dimer, Quantitative    Collection Time: 10/25/22 4:55 PM   Result Value Ref Range    D-Dimer, Quant 0.79 (H) <0.56 ug/ml(FEU)   COVID-19, Rapid    Collection Time: 10/25/22  4:55 PM    Specimen: Nasopharyngeal   Result Value Ref Range    Source NASAL      SARS-CoV-2, Rapid Detected (AA) NOTD     Rapid influenza A/B antigens    Collection Time: 10/25/22  4:55 PM    Specimen: Nasal Washing   Result Value Ref Range    Influenza A Ag Negative NEG      Influenza B Ag Negative NEG      Source Nasopharyngeal     Brain Natriuretic Peptide    Collection Time: 10/25/22  4:55 PM   Result Value Ref Range    NT Pro-BNP 2,497 (H) <450 PG/ML   Troponin    Collection Time: 10/25/22  4:55 PM   Result Value Ref Range    Troponin, High Sensitivity 15.5 (H) 0 - 14 pg/mL       I have personally reviewed imaging studies showing:  CT HEAD WO CONTRAST    Result Date: 10/25/2022  Head CT INDICATION: Syncopal episode. Multiple axial images obtained through the brain without intravenous contrast. Radiation dose reduction techniques were used for this study: All CT scans performed at this facility use one or all of the following: Automated exposure control, adjustment of the mA and/or kVp according to patient's size, iterative reconstruction. FINDINGS: No areas of abnormal attenuation are seen in the brain. There is no CT evidence of acute hemorrhage or infarction. The ventricles are normal in size. There are no extra-axial fluid collections. No masses are seen. The sinuses are clear. There are no bony lesions. No CT evidence of acute intracranial abnormality. XR CHEST PORTABLE    Result Date: 10/25/2022  XR CHEST PORTABLE 10/25/2022 4:05 PM HISTORY: Syncope COMPARISON: Chest x-ray 2/1/2019. A portable AP view of the chest was obtained. The lungs are clear. The heart is normal in size. Prior median sternotomy for CABG and valve replacement. Implantable cardiac device with single lead is unchanged in position. No pneumothorax. No pleural effusions. Echocardiogram:  04/13/22    TRANSTHORACIC ECHOCARDIOGRAM (TTE) COMPLETE (CONTRAST/BUBBLE/3D PRN) 04/13/2022  6:20 PM (Final)    Narrative  This is a summary report. The complete report is available in the patient's medical record. If you cannot access the medical record, please contact the sending organization for a detailed fax or copy. Left Ventricle: Left ventricle is moderately dilated. Normal wall thickness. Moderate global hypokinesis present. Moderately reduced left ventricular systolic function with a visually estimated EF of 35 - 40%. Abnormal diastolic function. Mitral Valve: Valve repaired by 26mm annular ring. Mild to moderate transvalvular regurgitation. ERO 0.24. RVol 43 mL. MV mean gradient is 4 mmHg at a HR of 60 BPM.    Tricuspid Valve: Mild to moderate transvalvular regurgitation. RVSP is 45 mmHg.     Signed by: Florencio Vazquez MD on 4/13/2022  6:20 PM        Orders Placed This Encounter   Medications    dexamethasone (DECADRON) injection 10 mg    famotidine (PEPCID) 20 mg in sodium chloride (PF) 10 mL injection    pantoprazole (PROTONIX) 40 mg in sodium chloride (PF) 10 mL injection    sodium chloride flush 0.9 % injection 5-40 mL    sodium chloride flush 0.9 % injection 5-40 mL    0.9 % sodium chloride infusion    OR Linked Order Group     ondansetron (ZOFRAN-ODT) disintegrating tablet 4 mg     ondansetron (ZOFRAN) injection 4 mg    polyethylene glycol (GLYCOLAX) packet 17 g    OR Linked Order Group     acetaminophen (TYLENOL) tablet 650 mg     acetaminophen (TYLENOL) suppository 650 mg    tuberculin injection 5 Units    guaiFENesin-dextromethorphan (ROBITUSSIN DM) 100-10 MG/5ML syrup 5 mL    aluminum & magnesium hydroxide-simethicone (MAALOX) 200-200-20 MG/5ML suspension 30 mL    potassium chloride (KLOR-CON M) extended release tablet 40 mEq    rosuvastatin (CRESTOR) tablet 20 mg    carvedilol (COREG) tablet 12.5 mg    lactated ringers bolus         Signed:  Ian Odell DO, DO    Part of this note may have been written by using a voice dictation software. The note has been proof read but may still contain some grammatical/other typographical errors. Detail Level: Detailed

## 2024-11-22 ENCOUNTER — NURSE ONLY (OUTPATIENT)
Age: 79
End: 2024-11-22

## 2024-11-22 DIAGNOSIS — I50.22 CHRONIC SYSTOLIC HF (HEART FAILURE) (HCC): ICD-10-CM

## 2024-11-22 DIAGNOSIS — Z95.810 AICD (AUTOMATIC CARDIOVERTER/DEFIBRILLATOR) PRESENT: Primary | ICD-10-CM

## 2024-12-12 ENCOUNTER — OFFICE VISIT (OUTPATIENT)
Age: 79
End: 2024-12-12
Payer: MEDICARE

## 2024-12-12 VITALS
DIASTOLIC BLOOD PRESSURE: 60 MMHG | SYSTOLIC BLOOD PRESSURE: 98 MMHG | WEIGHT: 130.6 LBS | HEIGHT: 65 IN | HEART RATE: 60 BPM | BODY MASS INDEX: 21.76 KG/M2

## 2024-12-12 DIAGNOSIS — D50.0 IRON DEFICIENCY ANEMIA DUE TO CHRONIC BLOOD LOSS: ICD-10-CM

## 2024-12-12 DIAGNOSIS — Z98.890 S/P MITRAL VALVE REPAIR: Chronic | ICD-10-CM

## 2024-12-12 DIAGNOSIS — I65.23 BILATERAL CAROTID ARTERY STENOSIS WITHOUT CEREBRAL INFARCTION: ICD-10-CM

## 2024-12-12 DIAGNOSIS — J43.1 PANLOBULAR EMPHYSEMA (HCC): ICD-10-CM

## 2024-12-12 DIAGNOSIS — I25.118 ATHEROSCLEROSIS OF NATIVE CORONARY ARTERY OF NATIVE HEART WITH STABLE ANGINA PECTORIS (HCC): ICD-10-CM

## 2024-12-12 DIAGNOSIS — E78.00 PURE HYPERCHOLESTEROLEMIA: ICD-10-CM

## 2024-12-12 DIAGNOSIS — I50.22 CHRONIC SYSTOLIC HF (HEART FAILURE) (HCC): Primary | ICD-10-CM

## 2024-12-12 DIAGNOSIS — Z95.810 AICD (AUTOMATIC CARDIOVERTER/DEFIBRILLATOR) PRESENT: Chronic | ICD-10-CM

## 2024-12-12 DIAGNOSIS — I11.0 BENIGN HYPERTENSIVE HEART DISEASE WITH CHF (CONGESTIVE HEART FAILURE) (HCC): ICD-10-CM

## 2024-12-12 PROCEDURE — 1123F ACP DISCUSS/DSCN MKR DOCD: CPT | Performed by: INTERNAL MEDICINE

## 2024-12-12 PROCEDURE — 99214 OFFICE O/P EST MOD 30 MIN: CPT | Performed by: INTERNAL MEDICINE

## 2024-12-12 PROCEDURE — 1126F AMNT PAIN NOTED NONE PRSNT: CPT | Performed by: INTERNAL MEDICINE

## 2024-12-12 ASSESSMENT — ENCOUNTER SYMPTOMS
BACK PAIN: 0
ABDOMINAL PAIN: 0
SHORTNESS OF BREATH: 0
COUGH: 0

## 2024-12-12 NOTE — PROGRESS NOTES
[facility] CARDIOLOGY  [address] [place] [address], [address]  [place] [address]      24      NAME:  Olivia Kim  : 1945  MRN: [id number]      SUBJECTIVE:   Olivia Kim is a 79 y.o. female seen for a follow up visit regarding the following:     Chief Complaint   Patient presents with    Hypertension    Congestive Heart Failure    Hyperlipidemia         HPI:   79 y.o. female has history of CAD, HFrEF and single lead ICD.  Overall she feels well.  She is exercising regularly. Patient is asymptomatic.  LHC 2019 with stable CAD and 3/3 grafts patent.  No CP.  Echocardiogram 2024 with ejection fraction 45-50%. No orthopnea, PND or edema.               Past Medical History, Past Surgical History, Family history, Social History, and Medications were all reviewed with the patient today and updated as necessary.     Current Outpatient Medications   Medication Sig Dispense Refill    b complex vitamins capsule Take 1 capsule by mouth daily      ferrous sulfate (FE TABS 325) 325 (65 Fe) MG EC tablet Take 1 tablet by mouth in the morning and at bedtime For iron supplementation 180 tablet 3    rosuvastatin (CRESTOR) 20 MG tablet TAKE 1 TABLET BY MOUTH DAILY for cholesterol 90 tablet 3    pantoprazole (PROTONIX) 40 MG tablet Take 1 tablet by mouth daily On empty stomach and wait at least 30 minutes before for stomach protection 90 tablet 2    amLODIPine (NORVASC) 5 MG tablet Take 1 tablet by mouth daily 90 tablet 3    lisinopril (PRINIVIL;ZESTRIL) 40 MG tablet Take 1 tablet by mouth daily For blood pressure 90 tablet 3    carvedilol (COREG) 25 MG tablet Take 1 tablet by mouth 2 times daily (with meals) 180 tablet 3    fluticasone (FLONASE) 50 MCG/ACT nasal spray 2 sprays by Each Nostril route daily For allergies 16 g 5    aspirin 81 MG EC tablet Take 1 tablet by mouth daily      vitamin D3 (CHOLECALCIFEROL) 125 MCG (5000 UT) TABS tablet Take 1 tablet by mouth daily       No current facility-administered

## 2025-01-20 RX ORDER — CARVEDILOL 25 MG/1
25 TABLET ORAL 2 TIMES DAILY WITH MEALS
Qty: 180 TABLET | Refills: 3 | Status: SHIPPED | OUTPATIENT
Start: 2025-01-20

## 2025-01-26 DIAGNOSIS — D50.0 IRON DEFICIENCY ANEMIA DUE TO CHRONIC BLOOD LOSS: Primary | ICD-10-CM

## 2025-01-26 DIAGNOSIS — I11.0 BENIGN HYPERTENSIVE HEART DISEASE WITH CHF (CONGESTIVE HEART FAILURE) (HCC): ICD-10-CM

## 2025-01-26 DIAGNOSIS — R53.83 OTHER FATIGUE: ICD-10-CM

## 2025-01-26 DIAGNOSIS — I10 ESSENTIAL HYPERTENSION: ICD-10-CM

## 2025-01-26 DIAGNOSIS — R73.09 OTHER ABNORMAL GLUCOSE: ICD-10-CM

## 2025-01-26 DIAGNOSIS — E78.00 PURE HYPERCHOLESTEROLEMIA: ICD-10-CM

## 2025-01-27 ENCOUNTER — LAB (OUTPATIENT)
Dept: FAMILY MEDICINE CLINIC | Facility: CLINIC | Age: 80
End: 2025-01-27

## 2025-01-27 DIAGNOSIS — R73.09 OTHER ABNORMAL GLUCOSE: ICD-10-CM

## 2025-01-27 DIAGNOSIS — I10 ESSENTIAL HYPERTENSION: ICD-10-CM

## 2025-01-27 DIAGNOSIS — I11.0 BENIGN HYPERTENSIVE HEART DISEASE WITH CHF (CONGESTIVE HEART FAILURE) (HCC): ICD-10-CM

## 2025-01-27 DIAGNOSIS — E78.00 PURE HYPERCHOLESTEROLEMIA: ICD-10-CM

## 2025-01-27 DIAGNOSIS — D50.0 IRON DEFICIENCY ANEMIA DUE TO CHRONIC BLOOD LOSS: ICD-10-CM

## 2025-01-27 LAB
ALBUMIN SERPL-MCNC: 3.7 G/DL (ref 3.2–4.6)
ALBUMIN/GLOB SERPL: 1.1 (ref 1–1.9)
ALP SERPL-CCNC: 56 U/L (ref 35–104)
ALT SERPL-CCNC: 12 U/L (ref 8–45)
ANION GAP SERPL CALC-SCNC: 9 MMOL/L (ref 7–16)
AST SERPL-CCNC: 21 U/L (ref 15–37)
BASOPHILS # BLD: 0.04 K/UL (ref 0–0.2)
BASOPHILS NFR BLD: 1 % (ref 0–2)
BILIRUB SERPL-MCNC: 0.4 MG/DL (ref 0–1.2)
BUN SERPL-MCNC: 13 MG/DL (ref 8–23)
CALCIUM SERPL-MCNC: 9.5 MG/DL (ref 8.8–10.2)
CHLORIDE SERPL-SCNC: 108 MMOL/L (ref 98–107)
CHOLEST SERPL-MCNC: 142 MG/DL (ref 0–200)
CO2 SERPL-SCNC: 27 MMOL/L (ref 20–29)
CREAT SERPL-MCNC: 0.77 MG/DL (ref 0.6–1.1)
DIFFERENTIAL METHOD BLD: ABNORMAL
EOSINOPHIL # BLD: 0.09 K/UL (ref 0–0.8)
EOSINOPHIL NFR BLD: 2.2 % (ref 0.5–7.8)
ERYTHROCYTE [DISTWIDTH] IN BLOOD BY AUTOMATED COUNT: 13.9 % (ref 11.9–14.6)
EST. AVERAGE GLUCOSE BLD GHB EST-MCNC: 104 MG/DL
GLOBULIN SER CALC-MCNC: 3.4 G/DL (ref 2.3–3.5)
GLUCOSE SERPL-MCNC: 88 MG/DL (ref 70–99)
HBA1C MFR BLD: 5.2 % (ref 0–5.6)
HCT VFR BLD AUTO: 38.8 % (ref 35.8–46.3)
HDLC SERPL-MCNC: 52 MG/DL (ref 40–60)
HDLC SERPL: 2.8 (ref 0–5)
HGB BLD-MCNC: 11.9 G/DL (ref 11.7–15.4)
IMM GRANULOCYTES # BLD AUTO: 0.02 K/UL (ref 0–0.5)
IMM GRANULOCYTES NFR BLD AUTO: 0.5 % (ref 0–5)
LDLC SERPL CALC-MCNC: 80 MG/DL (ref 0–100)
LYMPHOCYTES # BLD: 1.08 K/UL (ref 0.5–4.6)
LYMPHOCYTES NFR BLD: 26.2 % (ref 13–44)
MCH RBC QN AUTO: 29.6 PG (ref 26.1–32.9)
MCHC RBC AUTO-ENTMCNC: 30.7 G/DL (ref 31.4–35)
MCV RBC AUTO: 96.5 FL (ref 82–102)
MONOCYTES # BLD: 0.38 K/UL (ref 0.1–1.3)
MONOCYTES NFR BLD: 9.2 % (ref 4–12)
NEUTS SEG # BLD: 2.52 K/UL (ref 1.7–8.2)
NEUTS SEG NFR BLD: 60.9 % (ref 43–78)
NRBC # BLD: 0 K/UL (ref 0–0.2)
PLATELET # BLD AUTO: 198 K/UL (ref 150–450)
PMV BLD AUTO: 10.2 FL (ref 9.4–12.3)
POTASSIUM SERPL-SCNC: 4 MMOL/L (ref 3.5–5.1)
PROT SERPL-MCNC: 7.1 G/DL (ref 6.3–8.2)
RBC # BLD AUTO: 4.02 M/UL (ref 4.05–5.2)
SODIUM SERPL-SCNC: 144 MMOL/L (ref 136–145)
TRIGL SERPL-MCNC: 51 MG/DL (ref 0–150)
TSH W FREE THYROID IF ABNORMAL: 2.71 UIU/ML (ref 0.27–4.2)
VIT B12 SERPL-MCNC: 1086 PG/ML (ref 193–986)
VLDLC SERPL CALC-MCNC: 10 MG/DL (ref 6–23)
WBC # BLD AUTO: 4.1 K/UL (ref 4.3–11.1)

## 2025-02-06 ENCOUNTER — OFFICE VISIT (OUTPATIENT)
Dept: FAMILY MEDICINE CLINIC | Facility: CLINIC | Age: 80
End: 2025-02-06
Payer: MEDICARE

## 2025-02-06 VITALS
BODY MASS INDEX: 22.69 KG/M2 | WEIGHT: 136.2 LBS | HEART RATE: 68 BPM | DIASTOLIC BLOOD PRESSURE: 68 MMHG | OXYGEN SATURATION: 98 % | HEIGHT: 65 IN | SYSTOLIC BLOOD PRESSURE: 102 MMHG

## 2025-02-06 DIAGNOSIS — E78.00 PURE HYPERCHOLESTEROLEMIA: ICD-10-CM

## 2025-02-06 DIAGNOSIS — J43.1 PANLOBULAR EMPHYSEMA (HCC): ICD-10-CM

## 2025-02-06 DIAGNOSIS — R53.83 OTHER FATIGUE: ICD-10-CM

## 2025-02-06 DIAGNOSIS — I11.0 BENIGN HYPERTENSIVE HEART DISEASE WITH CHF (CONGESTIVE HEART FAILURE) (HCC): Primary | ICD-10-CM

## 2025-02-06 DIAGNOSIS — D50.0 IRON DEFICIENCY ANEMIA DUE TO CHRONIC BLOOD LOSS: ICD-10-CM

## 2025-02-06 DIAGNOSIS — E55.9 VITAMIN D DEFICIENCY: ICD-10-CM

## 2025-02-06 DIAGNOSIS — J30.89 OTHER ALLERGIC RHINITIS: ICD-10-CM

## 2025-02-06 PROCEDURE — 1159F MED LIST DOCD IN RCRD: CPT | Performed by: NURSE PRACTITIONER

## 2025-02-06 PROCEDURE — 99214 OFFICE O/P EST MOD 30 MIN: CPT | Performed by: NURSE PRACTITIONER

## 2025-02-06 PROCEDURE — 1123F ACP DISCUSS/DSCN MKR DOCD: CPT | Performed by: NURSE PRACTITIONER

## 2025-02-06 RX ORDER — PANTOPRAZOLE SODIUM 40 MG/1
40 TABLET, DELAYED RELEASE ORAL DAILY
Qty: 90 TABLET | Refills: 2 | Status: SHIPPED | OUTPATIENT
Start: 2025-02-06

## 2025-02-06 RX ORDER — CARVEDILOL 25 MG/1
25 TABLET ORAL 2 TIMES DAILY WITH MEALS
Qty: 180 TABLET | Refills: 3 | Status: SHIPPED | OUTPATIENT
Start: 2025-02-06

## 2025-02-06 RX ORDER — ROSUVASTATIN CALCIUM 20 MG/1
TABLET, COATED ORAL
Qty: 90 TABLET | Refills: 3 | Status: SHIPPED | OUTPATIENT
Start: 2025-02-06

## 2025-02-06 RX ORDER — LISINOPRIL 40 MG/1
40 TABLET ORAL DAILY
Qty: 90 TABLET | Refills: 3 | Status: SHIPPED | OUTPATIENT
Start: 2025-02-06

## 2025-02-06 RX ORDER — AMLODIPINE BESYLATE 5 MG/1
5 TABLET ORAL DAILY
Qty: 90 TABLET | Refills: 3 | Status: SHIPPED | OUTPATIENT
Start: 2025-02-06

## 2025-02-06 RX ORDER — FLUTICASONE PROPIONATE 50 MCG
2 SPRAY, SUSPENSION (ML) NASAL DAILY
Qty: 16 G | Refills: 5 | Status: SHIPPED | OUTPATIENT
Start: 2025-02-06

## 2025-02-06 RX ORDER — FERROUS SULFATE 325(65) MG
325 TABLET, DELAYED RELEASE (ENTERIC COATED) ORAL 2 TIMES DAILY
Qty: 180 TABLET | Refills: 3 | Status: SHIPPED | OUTPATIENT
Start: 2025-02-06

## 2025-02-06 SDOH — ECONOMIC STABILITY: FOOD INSECURITY: WITHIN THE PAST 12 MONTHS, YOU WORRIED THAT YOUR FOOD WOULD RUN OUT BEFORE YOU GOT MONEY TO BUY MORE.: NEVER TRUE

## 2025-02-06 SDOH — ECONOMIC STABILITY: FOOD INSECURITY: WITHIN THE PAST 12 MONTHS, THE FOOD YOU BOUGHT JUST DIDN'T LAST AND YOU DIDN'T HAVE MONEY TO GET MORE.: NEVER TRUE

## 2025-02-06 ASSESSMENT — ENCOUNTER SYMPTOMS
DIARRHEA: 0
EYES NEGATIVE: 1
EYE PAIN: 0
SHORTNESS OF BREATH: 0
CHOKING: 0
SINUS PRESSURE: 0
PHOTOPHOBIA: 0
STRIDOR: 0
RECTAL PAIN: 0
RESPIRATORY NEGATIVE: 1
COUGH: 0
GASTROINTESTINAL NEGATIVE: 1
ANAL BLEEDING: 0
NAUSEA: 0
SORE THROAT: 0
WHEEZING: 0
FACIAL SWELLING: 0
ALLERGIC/IMMUNOLOGIC NEGATIVE: 1
CHEST TIGHTNESS: 0
APNEA: 0
EYE REDNESS: 0
VOICE CHANGE: 0
ABDOMINAL DISTENTION: 0
BLOOD IN STOOL: 0
CONSTIPATION: 0
VOMITING: 0
EYE ITCHING: 0
ABDOMINAL PAIN: 0
COLOR CHANGE: 0
SINUS PAIN: 0
TROUBLE SWALLOWING: 0
RHINORRHEA: 0
EYE DISCHARGE: 0
BACK PAIN: 0

## 2025-02-06 ASSESSMENT — PATIENT HEALTH QUESTIONNAIRE - PHQ9
SUM OF ALL RESPONSES TO PHQ9 QUESTIONS 1 & 2: 0
SUM OF ALL RESPONSES TO PHQ QUESTIONS 1-9: 0
SUM OF ALL RESPONSES TO PHQ QUESTIONS 1-9: 0
1. LITTLE INTEREST OR PLEASURE IN DOING THINGS: NOT AT ALL
SUM OF ALL RESPONSES TO PHQ QUESTIONS 1-9: 0
SUM OF ALL RESPONSES TO PHQ QUESTIONS 1-9: 0
2. FEELING DOWN, DEPRESSED OR HOPELESS: NOT AT ALL

## 2025-02-06 NOTE — PROGRESS NOTES
PROGRESS NOTE    Chief Complaint   Patient presents with    Follow-up       SUBJECTIVE:     Olivia Kim is a 79 y.o. female with hx of  iron deficient anemia, CHF, CAD s/p CABG-currently followed by cardiology, hypertension, and hyperlipidemia, seen today in office for follow-up.  Patient reports doing well overall.  She feels quite well and has no complaints today.  She continues to take iron supplementation twice a day every other day and once a day alternating.Patient reports no chest pain, no shortness of breath, no unilateral or focal weakness, no orthopnea or PND.  GI and  review of systems is unremarkable.         Past Medical History, Past Surgical History, Family history, Social History, and Medications were all reviewed with the patient today and updated as necessary.       Current Outpatient Medications   Medication Sig Dispense Refill    amLODIPine (NORVASC) 5 MG tablet Take 1 tablet by mouth daily 90 tablet 3    lisinopril (PRINIVIL;ZESTRIL) 40 MG tablet Take 1 tablet by mouth daily For blood pressure 90 tablet 3    pantoprazole (PROTONIX) 40 MG tablet Take 1 tablet by mouth daily On empty stomach and wait at least 30 minutes before for stomach protection 90 tablet 2    rosuvastatin (CRESTOR) 20 MG tablet TAKE 1 TABLET BY MOUTH DAILY for cholesterol 90 tablet 3    ferrous sulfate (FE TABS 325) 325 (65 Fe) MG EC tablet Take 1 tablet by mouth in the morning and at bedtime For iron supplementation 180 tablet 3    carvedilol (COREG) 25 MG tablet Take 1 tablet by mouth 2 times daily (with meals) with meals 180 tablet 3    fluticasone (FLONASE) 50 MCG/ACT nasal spray 2 sprays by Each Nostril route daily For allergies 16 g 5    b complex vitamins capsule Take 1 capsule by mouth daily      aspirin 81 MG EC tablet Take 1 tablet by mouth daily      vitamin D3 (CHOLECALCIFEROL) 125 MCG (5000 UT) TABS tablet Take 1 tablet by mouth daily       No current facility-administered medications for this visit.

## 2025-06-25 ENCOUNTER — CLINICAL SUPPORT (OUTPATIENT)
Age: 80
End: 2025-06-25

## 2025-06-25 ENCOUNTER — OFFICE VISIT (OUTPATIENT)
Age: 80
End: 2025-06-25
Payer: MEDICARE

## 2025-06-25 VITALS
BODY MASS INDEX: 22.16 KG/M2 | HEART RATE: 70 BPM | DIASTOLIC BLOOD PRESSURE: 68 MMHG | SYSTOLIC BLOOD PRESSURE: 122 MMHG | HEIGHT: 65 IN | WEIGHT: 133 LBS

## 2025-06-25 DIAGNOSIS — Z95.810 AICD (AUTOMATIC CARDIOVERTER/DEFIBRILLATOR) PRESENT: Chronic | ICD-10-CM

## 2025-06-25 DIAGNOSIS — I50.22 CHRONIC SYSTOLIC HF (HEART FAILURE) (HCC): Primary | ICD-10-CM

## 2025-06-25 DIAGNOSIS — I11.0 BENIGN HYPERTENSIVE HEART DISEASE WITH CHF (CONGESTIVE HEART FAILURE) (HCC): ICD-10-CM

## 2025-06-25 DIAGNOSIS — D50.0 IRON DEFICIENCY ANEMIA DUE TO CHRONIC BLOOD LOSS: ICD-10-CM

## 2025-06-25 DIAGNOSIS — I65.23 BILATERAL CAROTID ARTERY STENOSIS WITHOUT CEREBRAL INFARCTION: ICD-10-CM

## 2025-06-25 DIAGNOSIS — J43.1 PANLOBULAR EMPHYSEMA (HCC): ICD-10-CM

## 2025-06-25 DIAGNOSIS — Z98.890 S/P MITRAL VALVE REPAIR: Chronic | ICD-10-CM

## 2025-06-25 DIAGNOSIS — I25.118 ATHEROSCLEROSIS OF NATIVE CORONARY ARTERY OF NATIVE HEART WITH STABLE ANGINA PECTORIS: ICD-10-CM

## 2025-06-25 DIAGNOSIS — E78.00 PURE HYPERCHOLESTEROLEMIA: ICD-10-CM

## 2025-06-25 PROCEDURE — 1123F ACP DISCUSS/DSCN MKR DOCD: CPT | Performed by: INTERNAL MEDICINE

## 2025-06-25 PROCEDURE — 99214 OFFICE O/P EST MOD 30 MIN: CPT | Performed by: INTERNAL MEDICINE

## 2025-06-25 PROCEDURE — 1159F MED LIST DOCD IN RCRD: CPT | Performed by: INTERNAL MEDICINE

## 2025-06-25 PROCEDURE — 1160F RVW MEDS BY RX/DR IN RCRD: CPT | Performed by: INTERNAL MEDICINE

## 2025-06-25 PROCEDURE — 1126F AMNT PAIN NOTED NONE PRSNT: CPT | Performed by: INTERNAL MEDICINE

## 2025-06-25 ASSESSMENT — ENCOUNTER SYMPTOMS
SHORTNESS OF BREATH: 0
COUGH: 0
BACK PAIN: 0
ABDOMINAL PAIN: 0

## 2025-06-25 NOTE — PROGRESS NOTES
[facility] CARDIOLOGY  [address] [place] [address], [address]  [place] [address]      25      NAME:  Olivia Kim  : 1945  MRN: [id number]      SUBJECTIVE:   Olivia Kim is a 80 y.o. female seen for a follow up visit regarding the following:     Chief Complaint   Patient presents with    Hypertension    Chronic systolic HF (heart failure         HPI:   80 y.o. female has history of CAD, HFrEF and single lead ICD.  Overall she feels well.  She is exercising regularly. Patient is asymptomatic.  LHC 2019 with stable CAD and 3/3 grafts patent.  No CP.  Echocardiogram 2025 with ejection fraction 35-40%. No orthopnea, PND or edema.               Past Medical History, Past Surgical History, Family history, Social History, and Medications were all reviewed with the patient today and updated as necessary.     Current Outpatient Medications   Medication Sig Dispense Refill    amLODIPine (NORVASC) 5 MG tablet Take 1 tablet by mouth daily 90 tablet 3    lisinopril (PRINIVIL;ZESTRIL) 40 MG tablet Take 1 tablet by mouth daily For blood pressure 90 tablet 3    pantoprazole (PROTONIX) 40 MG tablet Take 1 tablet by mouth daily On empty stomach and wait at least 30 minutes before for stomach protection 90 tablet 2    rosuvastatin (CRESTOR) 20 MG tablet TAKE 1 TABLET BY MOUTH DAILY for cholesterol 90 tablet 3    ferrous sulfate (FE TABS 325) 325 (65 Fe) MG EC tablet Take 1 tablet by mouth in the morning and at bedtime For iron supplementation 180 tablet 3    carvedilol (COREG) 25 MG tablet Take 1 tablet by mouth 2 times daily (with meals) with meals 180 tablet 3    fluticasone (FLONASE) 50 MCG/ACT nasal spray 2 sprays by Each Nostril route daily For allergies 16 g 5    b complex vitamins capsule Take 1 capsule by mouth daily      aspirin 81 MG EC tablet Take 1 tablet by mouth daily      vitamin D3 (CHOLECALCIFEROL) 125 MCG (5000 UT) TABS tablet Take 1 tablet by mouth daily       No current facility-administered

## 2025-08-07 ENCOUNTER — LAB (OUTPATIENT)
Dept: FAMILY MEDICINE CLINIC | Facility: CLINIC | Age: 80
End: 2025-08-07

## 2025-08-07 DIAGNOSIS — D50.0 IRON DEFICIENCY ANEMIA DUE TO CHRONIC BLOOD LOSS: ICD-10-CM

## 2025-08-07 DIAGNOSIS — E55.9 VITAMIN D DEFICIENCY: ICD-10-CM

## 2025-08-07 DIAGNOSIS — I11.0 BENIGN HYPERTENSIVE HEART DISEASE WITH CHF (CONGESTIVE HEART FAILURE) (HCC): ICD-10-CM

## 2025-08-07 DIAGNOSIS — E78.00 PURE HYPERCHOLESTEROLEMIA: ICD-10-CM

## 2025-08-07 LAB
25(OH)D3 SERPL-MCNC: 50.1 NG/ML (ref 30–100)
ALBUMIN SERPL-MCNC: 3.5 G/DL (ref 3.2–4.6)
ALBUMIN/GLOB SERPL: 1 (ref 1–1.9)
ALP SERPL-CCNC: 56 U/L (ref 35–104)
ALT SERPL-CCNC: 9 U/L (ref 8–45)
ANION GAP SERPL CALC-SCNC: 9 MMOL/L (ref 7–16)
AST SERPL-CCNC: 23 U/L (ref 15–37)
BASOPHILS # BLD: 0.04 K/UL (ref 0–0.2)
BASOPHILS NFR BLD: 0.9 % (ref 0–2)
BILIRUB SERPL-MCNC: 0.3 MG/DL (ref 0–1.2)
BUN SERPL-MCNC: 15 MG/DL (ref 8–23)
CALCIUM SERPL-MCNC: 9.5 MG/DL (ref 8.8–10.2)
CHLORIDE SERPL-SCNC: 109 MMOL/L (ref 98–107)
CHOLEST SERPL-MCNC: 142 MG/DL (ref 0–200)
CO2 SERPL-SCNC: 26 MMOL/L (ref 20–29)
CREAT SERPL-MCNC: 0.84 MG/DL (ref 0.6–1.1)
DIFFERENTIAL METHOD BLD: ABNORMAL
EOSINOPHIL # BLD: 0.13 K/UL (ref 0–0.8)
EOSINOPHIL NFR BLD: 2.9 % (ref 0.5–7.8)
ERYTHROCYTE [DISTWIDTH] IN BLOOD BY AUTOMATED COUNT: 13.9 % (ref 11.9–14.6)
GLOBULIN SER CALC-MCNC: 3.4 G/DL (ref 2.3–3.5)
GLUCOSE SERPL-MCNC: 85 MG/DL (ref 70–99)
HCT VFR BLD AUTO: 35.9 % (ref 35.8–46.3)
HDLC SERPL-MCNC: 55 MG/DL (ref 40–60)
HDLC SERPL: 2.6 (ref 0–5)
HGB BLD-MCNC: 11 G/DL (ref 11.7–15.4)
IMM GRANULOCYTES # BLD AUTO: 0.02 K/UL (ref 0–0.5)
IMM GRANULOCYTES NFR BLD AUTO: 0.4 % (ref 0–5)
LDLC SERPL CALC-MCNC: 78 MG/DL (ref 0–100)
LYMPHOCYTES # BLD: 0.89 K/UL (ref 0.5–4.6)
LYMPHOCYTES NFR BLD: 20 % (ref 13–44)
MCH RBC QN AUTO: 30.2 PG (ref 26.1–32.9)
MCHC RBC AUTO-ENTMCNC: 30.6 G/DL (ref 31.4–35)
MCV RBC AUTO: 98.6 FL (ref 82–102)
MONOCYTES # BLD: 0.41 K/UL (ref 0.1–1.3)
MONOCYTES NFR BLD: 9.2 % (ref 4–12)
NEUTS SEG # BLD: 2.97 K/UL (ref 1.7–8.2)
NEUTS SEG NFR BLD: 66.6 % (ref 43–78)
NRBC # BLD: 0 K/UL (ref 0–0.2)
PLATELET # BLD AUTO: 213 K/UL (ref 150–450)
PMV BLD AUTO: 10.3 FL (ref 9.4–12.3)
POTASSIUM SERPL-SCNC: 4 MMOL/L (ref 3.5–5.1)
PROT SERPL-MCNC: 7 G/DL (ref 6.3–8.2)
RBC # BLD AUTO: 3.64 M/UL (ref 4.05–5.2)
SODIUM SERPL-SCNC: 145 MMOL/L (ref 136–145)
TRIGL SERPL-MCNC: 45 MG/DL (ref 0–150)
VLDLC SERPL CALC-MCNC: 9 MG/DL (ref 6–23)
WBC # BLD AUTO: 4.5 K/UL (ref 4.3–11.1)

## 2025-08-14 ENCOUNTER — OFFICE VISIT (OUTPATIENT)
Dept: FAMILY MEDICINE CLINIC | Facility: CLINIC | Age: 80
End: 2025-08-14
Payer: COMMERCIAL

## 2025-08-14 VITALS
BODY MASS INDEX: 22.49 KG/M2 | SYSTOLIC BLOOD PRESSURE: 112 MMHG | HEIGHT: 65 IN | HEART RATE: 72 BPM | WEIGHT: 135 LBS | DIASTOLIC BLOOD PRESSURE: 64 MMHG | OXYGEN SATURATION: 96 %

## 2025-08-14 DIAGNOSIS — J43.1 PANLOBULAR EMPHYSEMA (HCC): ICD-10-CM

## 2025-08-14 DIAGNOSIS — E55.9 VITAMIN D DEFICIENCY: ICD-10-CM

## 2025-08-14 DIAGNOSIS — D50.0 IRON DEFICIENCY ANEMIA DUE TO CHRONIC BLOOD LOSS: ICD-10-CM

## 2025-08-14 DIAGNOSIS — E78.00 PURE HYPERCHOLESTEROLEMIA: ICD-10-CM

## 2025-08-14 DIAGNOSIS — Z00.00 ENCOUNTER FOR ANNUAL WELLNESS VISIT (AWV) IN MEDICARE PATIENT: Primary | ICD-10-CM

## 2025-08-14 DIAGNOSIS — I11.0 BENIGN HYPERTENSIVE HEART DISEASE WITH CHF (CONGESTIVE HEART FAILURE) (HCC): ICD-10-CM

## 2025-08-14 PROCEDURE — 99214 OFFICE O/P EST MOD 30 MIN: CPT | Performed by: NURSE PRACTITIONER

## 2025-08-14 PROCEDURE — 1123F ACP DISCUSS/DSCN MKR DOCD: CPT | Performed by: NURSE PRACTITIONER

## 2025-08-14 PROCEDURE — G2211 COMPLEX E/M VISIT ADD ON: HCPCS | Performed by: NURSE PRACTITIONER

## 2025-08-14 PROCEDURE — G0439 PPPS, SUBSEQ VISIT: HCPCS | Performed by: NURSE PRACTITIONER

## 2025-08-14 ASSESSMENT — ENCOUNTER SYMPTOMS
ALLERGIC/IMMUNOLOGIC NEGATIVE: 1
SHORTNESS OF BREATH: 0
SINUS PAIN: 0
EYE REDNESS: 0
BACK PAIN: 0
CONSTIPATION: 0
GASTROINTESTINAL NEGATIVE: 1
APNEA: 0
SORE THROAT: 0
RECTAL PAIN: 0
EYE PAIN: 0
WHEEZING: 0
COLOR CHANGE: 0
EYE DISCHARGE: 0
ANAL BLEEDING: 0
ABDOMINAL DISTENTION: 0
CHEST TIGHTNESS: 0
EYE ITCHING: 0
DIARRHEA: 0
TROUBLE SWALLOWING: 0
FACIAL SWELLING: 0
BLOOD IN STOOL: 0
STRIDOR: 0
SINUS PRESSURE: 0
EYES NEGATIVE: 1
VOMITING: 0
NAUSEA: 0
COUGH: 0
RESPIRATORY NEGATIVE: 1
RHINORRHEA: 0
VOICE CHANGE: 0
PHOTOPHOBIA: 0
CHOKING: 0
ABDOMINAL PAIN: 0

## 2025-08-14 ASSESSMENT — PATIENT HEALTH QUESTIONNAIRE - PHQ9
SUM OF ALL RESPONSES TO PHQ QUESTIONS 1-9: 0
2. FEELING DOWN, DEPRESSED OR HOPELESS: NOT AT ALL
1. LITTLE INTEREST OR PLEASURE IN DOING THINGS: NOT AT ALL
SUM OF ALL RESPONSES TO PHQ QUESTIONS 1-9: 0

## (undated) DEVICE — NEEDLE SYR 18GA L1.5IN RED PLAS HUB S STL BLNT FILL W/O

## (undated) DEVICE — CANNULA NSL ORAL AD FOR CAPNOFLEX CO2 O2 AIRLFE

## (undated) DEVICE — SYRINGE, LUER SLIP, STERILE, 60ML: Brand: MEDLINE

## (undated) DEVICE — YANKAUER,BULB TIP,W/O VENT,RIGID,STERILE: Brand: MEDLINE

## (undated) DEVICE — SYRINGE MED 10ML LUERLOCK TIP W/O SFTY DISP

## (undated) DEVICE — KENDALL RADIOLUCENT FOAM MONITORING ELECTRODE RECTANGULAR SHAPE: Brand: KENDALL

## (undated) DEVICE — BLOCK BITE AD 60FR W/ VELC STRP ADDRESSES MOST PT AND

## (undated) DEVICE — GAUZE,SPONGE,4"X4",12PLY,WOVEN,NS,LF: Brand: MEDLINE

## (undated) DEVICE — CONNECTOR TBNG OD5-7MM O2 END DISP

## (undated) DEVICE — SINGLE PORT MANIFOLD: Brand: NEPTUNE 2

## (undated) DEVICE — SYRINGE MED 3ML CLR PLAS STD N CTRL LUERLOCK TIP DISP

## (undated) DEVICE — LUBE JELLY FOIL PACK 1.4 OZ: Brand: MEDLINE INDUSTRIES, INC.

## (undated) DEVICE — AIRLIFE™ OXYGEN TUBING 7 FEET (2.1 M) CRUSH RESISTANT OXYGEN TUBING, VINYL TIPPED: Brand: AIRLIFE™

## (undated) DEVICE — CONTAINER FORMALIN PREFILLED 10% NBF 60ML

## (undated) DEVICE — FORCEPS BX L240CM JAW DIA2.8MM L CAP W/ NDL MIC MESH TOOTH